# Patient Record
Sex: FEMALE | URBAN - METROPOLITAN AREA
[De-identification: names, ages, dates, MRNs, and addresses within clinical notes are randomized per-mention and may not be internally consistent; named-entity substitution may affect disease eponyms.]

---

## 2023-01-19 ENCOUNTER — APPOINTMENT (OUTPATIENT)
Dept: URBAN - METROPOLITAN AREA CLINIC 241 | Age: 73
Setting detail: DERMATOLOGY
End: 2023-01-21

## 2023-01-19 DIAGNOSIS — L20.89 OTHER ATOPIC DERMATITIS: ICD-10-CM

## 2023-01-19 PROBLEM — L20.84 INTRINSIC (ALLERGIC) ECZEMA: Status: ACTIVE | Noted: 2023-01-19

## 2023-01-19 PROCEDURE — OTHER PRESCRIPTION: OTHER

## 2023-01-19 PROCEDURE — 99213 OFFICE O/P EST LOW 20 MIN: CPT

## 2023-01-19 PROCEDURE — OTHER COUNSELING: TOPICAL STEROIDS: OTHER

## 2023-01-19 PROCEDURE — OTHER COUNSELING: OTHER

## 2023-01-19 PROCEDURE — OTHER PRESCRIPTION MEDICATION MANAGEMENT: OTHER

## 2023-01-19 RX ORDER — TRIAMCINOLONE ACETONIDE 1 MG/G
CREAM TOPICAL
Qty: 454 | Refills: 3 | Status: ERX | COMMUNITY
Start: 2023-01-19

## 2023-01-19 NOTE — PROCEDURE: PRESCRIPTION MEDICATION MANAGEMENT
Detail Level: Zone
Render In Strict Bullet Format?: No
Plan: Highly recommended to shower twice a day warm showers \\nDiscussed to use mild and free products
Initiate Treatment: Triamcinolone 0.1% topical cream twice a day \\n\\nDiscussed to use OTC Cereva cream to areas twice a day

## 2023-09-29 ENCOUNTER — TELEPHONE (OUTPATIENT)
Dept: ENDOCRINOLOGY CLINIC | Facility: CLINIC | Age: 73
End: 2023-09-29

## 2023-09-29 ENCOUNTER — OFFICE VISIT (OUTPATIENT)
Dept: ENDOCRINOLOGY CLINIC | Facility: CLINIC | Age: 73
End: 2023-09-29

## 2023-09-29 VITALS — DIASTOLIC BLOOD PRESSURE: 61 MMHG | SYSTOLIC BLOOD PRESSURE: 129 MMHG | WEIGHT: 110 LBS | HEART RATE: 71 BPM

## 2023-09-29 DIAGNOSIS — E10.65 UNCONTROLLED TYPE 1 DIABETES MELLITUS WITH HYPERGLYCEMIA (HCC): Primary | ICD-10-CM

## 2023-09-29 LAB
GLUCOSE BLOOD: 292
TEST STRIP LOT #: NORMAL NUMERIC

## 2023-09-29 RX ORDER — SENNOSIDES 8.6 MG
650 CAPSULE ORAL EVERY 8 HOURS PRN
COMMUNITY

## 2023-09-29 RX ORDER — BUDESONIDE AND FORMOTEROL FUMARATE DIHYDRATE 80; 4.5 UG/1; UG/1
2 AEROSOL RESPIRATORY (INHALATION) 2 TIMES DAILY
COMMUNITY
Start: 2023-07-26

## 2023-09-29 RX ORDER — ATORVASTATIN CALCIUM 40 MG/1
40 TABLET, FILM COATED ORAL NIGHTLY
COMMUNITY
Start: 2022-05-13

## 2023-09-29 RX ORDER — AMLODIPINE BESYLATE 2.5 MG/1
1 TABLET ORAL DAILY
COMMUNITY
Start: 2022-12-08

## 2023-09-29 RX ORDER — NICOTINE POLACRILEX 4 MG
15 LOZENGE BUCCAL ONCE
COMMUNITY

## 2023-09-29 RX ORDER — GLUCAGON INJECTION, SOLUTION 1 MG/.2ML
1 INJECTION, SOLUTION SUBCUTANEOUS
COMMUNITY
Start: 2023-04-27

## 2023-09-29 RX ORDER — ESCITALOPRAM OXALATE 10 MG/1
10 TABLET ORAL DAILY
COMMUNITY
Start: 2023-07-10

## 2023-09-29 RX ORDER — ACETAMINOPHEN 160 MG
2000 TABLET,DISINTEGRATING ORAL DAILY
COMMUNITY

## 2023-09-29 RX ORDER — LOPERAMIDE HYDROCHLORIDE 2 MG/1
2 TABLET ORAL DAILY
COMMUNITY

## 2023-09-29 RX ORDER — OXYBUTYNIN CHLORIDE 5 MG/1
5 TABLET, EXTENDED RELEASE ORAL DAILY
COMMUNITY
Start: 2023-08-22

## 2023-09-29 RX ORDER — MONTELUKAST SODIUM 10 MG/1
10 TABLET ORAL DAILY
COMMUNITY
Start: 2023-06-13

## 2023-09-29 RX ORDER — ASPIRIN 81 MG/1
81 TABLET ORAL DAILY
COMMUNITY

## 2023-09-29 RX ORDER — ERGOCALCIFEROL 1.25 MG/1
50000 CAPSULE ORAL WEEKLY
COMMUNITY

## 2023-09-29 RX ORDER — ALBUTEROL SULFATE 2.5 MG/3ML
3 SOLUTION RESPIRATORY (INHALATION) EVERY 6 HOURS PRN
COMMUNITY
Start: 2021-04-19

## 2023-09-29 RX ORDER — LOSARTAN POTASSIUM 50 MG/1
50 TABLET ORAL DAILY
COMMUNITY
Start: 2022-05-13

## 2023-09-29 RX ORDER — CLOPIDOGREL BISULFATE 75 MG/1
75 TABLET ORAL DAILY
COMMUNITY
Start: 2023-03-17

## 2023-09-29 RX ORDER — CETIRIZINE HYDROCHLORIDE 10 MG/1
10 TABLET ORAL DAILY
COMMUNITY
Start: 2023-08-22

## 2023-09-29 NOTE — PATIENT INSTRUCTIONS
Scotty Cloud 4/18/1950    Updated Regimen as of 10/2/2023 2:45pm:    Lantus    AM: 8 units   PM: 5 units    Novolog  IF glucose under 120 before meal - give 6 ounces of OJ then give insulin for meal     IF patient is not eating at least 30 gram of carbohdrate then give 2 unit less of insulin      CHANGE      Breakfast   Glucose 120-150 3 units  151-199 5 units  200-249 7 units  250-400 9 units     Lunch and dinner:   Glucose 120-150 2 units  151-199 3 units  200-249 5 units   250-299 6 units  300-349 7 units   350-400 8 units

## 2023-09-29 NOTE — PROGRESS NOTES
Name: Precious Damon  Date: 9/29/2023    Referring Physician: No ref. provider found    HISTORY OF PRESENT ILLNESS   Precious Damon is a 68year old female who presents for diabetes mellitus type 1, diagnosed approximately 200. She was previously followed by endocrinology at [facility]. However after most recent hospitalization this summer moved to assisted living and now Endocrinologist closer to home. Prior HbA, C or glycohemoglobin were 8.0% 8/2023. 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 Last CVA in 2021. First CVA 1996 -->since most recent hospitalization moved to assisted living          Hypertension present: Yes         Hyperlipidemia present: Yes         Peripheral Vascular Disease present: Yes    : Nephropathy present: No    Neuro: Neuropathy present: Yes    Skin: Infection or ulceration: No    Osteoporosis: Yes, diagnosed in 2015 -->previously maintained on prolia therapy     Thyroid disease: Yes      Medications:   No current outpatient medications on file. Allergies:   No Known Allergies    Social History:   Social History    Socioeconomic History      Marital status:     Tobacco Use      Smoking status: Never      Smokeless tobacco: Never      Medical History:   History reviewed.  No pertinent past medical history. Surgical history:   History reviewed. No pertinent surgical history. PHYSICAL EXAM  /61   Pulse 71   Wt 110 lb (49.9 kg)     General Appearance:  alert, well developed, in no acute distress  Eyes:  normal conjunctivae, sclera. , normal sclera and normal pupils  Ears/Nose/Mouth/Throat/Neck:  no palpable thyroid nodules or cervical lymphadenopathy  Musculoskeletal:  normal muscle strength and tone  Skin:  normal moisture and skin texture  Hair & Nails:  normal scalp hair     Hematologic:  no excessive bruising  Neuro:  sensory grossly intact and motor grossly intact, normal monofilament exam  Psychiatric:  oriented to time, self, and place  Nutritional:  no abnormal weight gain or loss      ASSESSMENT/PLAN:      Diabetes Mellitus Type 1   -Uncontrolled  -Discussed importance of glycemic control to prevent complications of diabetes  -Discussed complications of diabetes include retinopathy, neuropathy, nephropathy and cardiovascular disease  -Discussed ABCs of DM  -Discussed importance of SBGM  -Discussed importance of consistent meals and trying to pair CHO intake with insulin therapy  -Continue Lantus 8 units subcutaneous QAM; 5 units subcutaneous QPM  -Change Novolog scale to 2-9 units subcutaneous with meals based on BG levels  -Reviewed BG levels at length with family and Dorian CGM   -Suspect she does need bigger Novolog dose at breakfast   -Discussed possible iLet pump  -Normotensive    2. Osteoporosis  - Previous prolia therapy  - Will discuss with brother continued therapy     Continuous Glucose Monitoring Interpretation    Shaheen Laird has undergone continuous glucose monitoring with the personal [facility]. Her blood glucose tracings were evaluated for 2 weeks prior to office visit  Her blood glucose tracings demonstrated significant blood glucose variability particularly after meals.   She did experience hypoglycemia during the week of evaluation particularly after over correction. As a result of her testing her medication was adjusted as noted above.      RTC 3 months; will review CGM in 3 weeks     9/29/2023  Jarrell Aggarwal MD

## 2023-09-29 NOTE — TELEPHONE ENCOUNTER
[facility] Service requesting to speak with RN regarding patient's office visit today - wanted to know if there are changes to patient's Diabetes treatment plan. Please fax treatment plan to [phone]. Please call. Thank you.

## 2023-10-02 ENCOUNTER — TELEPHONE (OUTPATIENT)
Dept: ENDOCRINOLOGY CLINIC | Facility: CLINIC | Age: 73
End: 2023-10-02

## 2023-10-02 NOTE — TELEPHONE ENCOUNTER
Please call patient's brother or sister in regards to insulin instructions. Reviewed Dorian and she has significantly more hyperglycemia after breakfast compared to lunch and dinner. Did family have any decisions about iLet pump?      Change insulin based on meal    IF glucose under 120 before meal - give 6 ounces of OJ then give insulin for meal     IF patient is not eating at least 30 gram of carbohdrate then give 2 unit less of insulin     CHANGE     Breakfast   120-150 3 units  151-199 5 units  200-249 7 units  249-400 9 units    Lunch and dinner:   Glucose 120-150 2 units  151-199 3 units  200-249 5 units   240-299 6 units  300-349 7 units   350-400 8 units

## 2023-10-02 NOTE — TELEPHONE ENCOUNTER
Called and spoke with brother, Brett Quintana. Reviewed plan and he would like it in his VM; will plan to call again and leave . They have not made a decision on the Ilet pump as they are working with insurance but stated they should hear back in a couple of days and will notify us.  left on Chandan's cell phone reviewing plan    Of note, TE 9/29 shows LOV was faxed to [facility] at [phone] this morning. Faxed updated AVS regimen; Chandan aware and gave consent.

## 2023-10-04 NOTE — TELEPHONE ENCOUNTER
Amanda/[facility] called for refill for Alvino Newby. Please send to [address]. Pt is almost out of medication.

## 2023-10-05 RX ORDER — INSULIN ASPART 100 [IU]/ML
INJECTION, SOLUTION INTRAVENOUS; SUBCUTANEOUS
Qty: 27 ML | Refills: 0 | Status: SHIPPED | OUTPATIENT
Start: 2023-10-05

## 2023-10-06 NOTE — TELEPHONE ENCOUNTER
Called Aleks Garsia back and wanted to confirm with RN the scale that they have on file for patient's Novolog Flexpen. Per 10/02/23 TE the ff is the regimen:     \"IF glucose under 120 before meal - give 6 ounces of OJ then give insulin for meal     IF patient is not eating at least 30 gram of carbohdrate then give 2 unit less of insulin      CHANGE      Breakfast   120-150 3 units  151-199 5 units  200-249 7 units  250-400 9 units     Lunch and dinner:   Glucose 120-150 2 units  151-199 3 units  200-249 5 units   250-299 6 units  300-349 7 units   350-400 8 units\"    Aleks Garsia read what they have on file and it matches the above order as written. Nothing further is needed from the office at this time.

## 2023-10-22 ENCOUNTER — TELEPHONE (OUTPATIENT)
Dept: ENDOCRINOLOGY CLINIC | Facility: CLINIC | Age: 73
End: 2023-10-22

## 2023-10-22 NOTE — TELEPHONE ENCOUNTER
RN called from facility with morning BG level above 500. Last night was 240 prior to bedtime but saw some snacks in her room. Give 9 units of insulin with breakfast and recheck in 2 hours. Endo staff - please follow up tomorrow on BG levels. Thanks.

## 2023-10-23 ENCOUNTER — MED REC SCAN ONLY (OUTPATIENT)
Dept: ENDOCRINOLOGY CLINIC | Facility: CLINIC | Age: 73
End: 2023-10-23

## 2023-10-23 NOTE — TELEPHONE ENCOUNTER
Ok, noted. Has she been getting her bedtime lantus? Can they send over the list of BG levels from the past week?

## 2023-10-23 NOTE — TELEPHONE ENCOUNTER
RN paged 2 hours later glucose was still above 500 therefore gave 6 units of correction and recommended 8 units subcutaneous with lunch. RN staff please call facility this AM to get BG levels. Thanks.

## 2023-10-23 NOTE — TELEPHONE ENCOUNTER
Dr. Sonido Avila    Patient was given 9 units of Novolog this morning. RN rechecked blood sugar again after 15 minutes - still 529. Nurse is going to back once she can check sugar again and call us with an update. Nurse stated that she was Covid + on 10/13 however is testing negative now.

## 2023-10-23 NOTE — TELEPHONE ENCOUNTER
Pura Quinones again to follow up on glucose fax as RN has not received it. Also gave fax number of general fax. Will check again.

## 2023-10-23 NOTE — TELEPHONE ENCOUNTER
Peder Light calling back states blood sugar is 271 at 10:05am, states will check again in one hour before lunch. Please call.

## 2023-10-23 NOTE — TELEPHONE ENCOUNTER
Called nurse Antionette Sherman and gave below order and asked for RN to fax the order. RN generated a letter and faxed it to confirmed fax of [phone]    RN also called sister June on ROI and will bring [facility]  Thursday for download. June was also informed of the change in insulin dose.

## 2023-10-23 NOTE — TELEPHONE ENCOUNTER
Reviewed BG log. She is having a lot more hyperglycemia in the past week. This could be due partly to Covid infection. Increase Lantus to 8 units subcutaneous BID. Lets continue current Humalog dose for now. Please call patient's sister to see if she could bring us the CGM  for download on Thursday. Thanks.

## 2023-10-24 ENCOUNTER — TELEPHONE (OUTPATIENT)
Dept: ENDOCRINOLOGY CLINIC | Facility: CLINIC | Age: 73
End: 2023-10-24

## 2023-10-24 NOTE — TELEPHONE ENCOUNTER
Spoke to patient's sister who stated she will plan to keep Thursdays RN visit appointment for meter download.

## 2023-10-24 NOTE — TELEPHONE ENCOUNTER
Yes, that is fine. She can bring it whenever she has time from my schedule. Just need to check RN schedule. Thanks.

## 2023-10-25 ENCOUNTER — LAB REQUISITION (OUTPATIENT)
Dept: LAB | Facility: HOSPITAL | Age: 73
End: 2023-10-25

## 2023-10-25 DIAGNOSIS — R69 ILLNESS, UNSPECIFIED: ICD-10-CM

## 2023-10-25 LAB
BILIRUB UR QL STRIP.AUTO: NEGATIVE
CLARITY UR REFRACT.AUTO: CLEAR
COLOR UR AUTO: YELLOW
GLUCOSE UR STRIP.AUTO-MCNC: NORMAL MG/DL
HYALINE CASTS #/AREA URNS AUTO: PRESENT /LPF
KETONES UR STRIP.AUTO-MCNC: NEGATIVE MG/DL
LEUKOCYTE ESTERASE UR QL STRIP.AUTO: 250
NITRITE UR QL STRIP.AUTO: NEGATIVE
PH UR STRIP.AUTO: 7 [PH] (ref 5–8)
SP GR UR STRIP.AUTO: 1.02 (ref 1–1.03)
UROBILINOGEN UR STRIP.AUTO-MCNC: NORMAL MG/DL
WBC #/AREA URNS AUTO: >50 /HPF

## 2023-10-25 PROCEDURE — 87086 URINE CULTURE/COLONY COUNT: CPT | Performed by: FAMILY MEDICINE

## 2023-10-25 PROCEDURE — 87186 SC STD MICRODIL/AGAR DIL: CPT | Performed by: FAMILY MEDICINE

## 2023-10-25 PROCEDURE — 87088 URINE BACTERIA CULTURE: CPT | Performed by: FAMILY MEDICINE

## 2023-10-25 PROCEDURE — 81001 URINALYSIS AUTO W/SCOPE: CPT | Performed by: FAMILY MEDICINE

## 2023-10-26 ENCOUNTER — TELEPHONE (OUTPATIENT)
Dept: ENDOCRINOLOGY CLINIC | Facility: CLINIC | Age: 73
End: 2023-10-26

## 2023-10-26 ENCOUNTER — NURSE ONLY (OUTPATIENT)
Dept: ENDOCRINOLOGY CLINIC | Facility: CLINIC | Age: 73
End: 2023-10-26

## 2023-10-26 DIAGNOSIS — E10.65 UNCONTROLLED TYPE 1 DIABETES MELLITUS WITH HYPERGLYCEMIA (HCC): Primary | ICD-10-CM

## 2023-10-26 NOTE — PROGRESS NOTES
Patient's sister came in today to drop off patient's Cavazos 3 reader. Dorian 3 download placed in Dr. August Roman folder for review.

## 2023-10-26 NOTE — TELEPHONE ENCOUNTER
Unable to leave message for Amanda at [facility] [phone]  Please see TE from 10/23/23 regarding lantus increase   Spoke with sister and advised on note below.   Will try calling [employer] tomorrow.

## 2023-10-26 NOTE — TELEPHONE ENCOUNTER
Please call sister and facility - reviewed CGM which is significantly improved in the past 2 days.  I suspect the higher BG levels were due to infection.  For now lets continue the higher dose of Lantus and current humalog dose. Thanks.

## 2023-11-07 ENCOUNTER — PATIENT MESSAGE (OUTPATIENT)
Dept: ENDOCRINOLOGY CLINIC | Facility: CLINIC | Age: 73
End: 2023-11-07

## 2023-11-08 NOTE — TELEPHONE ENCOUNTER
Dr Posadas Half,    Please see below    OK to start the letter of necessity and SMN?  If so, please route back to me and I can start working on it

## 2023-11-08 NOTE — TELEPHONE ENCOUNTER
Dr Jas De La Cruz,    Completed SMN that you will need to sign/review (a little different than standard SMN) along with LOV, A1C, and demographics. Please review the below for LMN and let me know if you would like any changes/have any recommendations:    ------------    Date: 2023    Pt: Reyes Daniel  : 1950    To Whom It May Concern:    I am writing to request a new Beta Bionic ILet Pump for my patient Reyes Daniel. She has uncontrolled and brittle type 1 diabetes. This request is medically necessary for the following reasons:     Patient has a history of wide glycemic excursions resulting in hyper and hypo glycemia  Patient has a history of poorly control diabetes  Due to living situations, previous pump (which is in-warranty) cannot be utilized     The Lettuce Eat Pump will help to lessen her glycemic excursions and overall help to improve her poorly controlled diabetes. Though [facility]'s current insulin pump is in-warranty, I am requesting a new Beta Bionic ILet Insulin Pump. As is known, the Beta Bionic ILet pump is a closed loop system. It helps manage the burden of diabetes by determining a majority of settings/ratios based off of blood sugar patterns and weight. This high end technology eliminates carb counting making it much easier for patients (or caregivers/staff) to bolus and cover for meals. Additionally, this algorithm monitors for high and low blood sugar thus preventing frequent hyper and hypoglycemia events. Due to the algorithm, it automatically adjusts insulin in response to predicted glucose levels to help increase time in the American Diabetes Association recommended target range of  mg/dl. This is extremely important as Fran Haider has hypoglycemia unawareness. Maite's most recent A1c was 8.0% on 8/15/2023 with wide glycemic variability.    With a new Beta Bionic ILet Insulin Pump providing automated insulin adjustments to assist with tighter blood glucose management, I expect her to obtain a much-improved A1c of <7% which is at goal based off age, medical history, and living situation. As noted above, there are many benefits to this specific pump such as meal blousing, adjustments to basal, and mini corrections every 5 minutes resulting in more time in target blood sugar ranges. With new technology and considering that type 1 diabetes is a chronic lifelong disease, it is only of benefit to allow my patient to transition to this pump to prevent any future complications from diabetes.     Thank you for your time to review this request.      Sincerely,       Dr Alicia Osuna [facility]   [phone]

## 2023-11-09 NOTE — TELEPHONE ENCOUNTER
SMN signed (changing q3days, target higher 130 mg/dl), LMN signed, face sheet, A1C, and LOV completed    "[employer] message sent with attachment.  Awaiting response if he would also like us to send it to [facility] at [phone] and/or if ketone strips are needed    Placed in brown folder

## 2023-11-09 NOTE — TELEPHONE ENCOUNTER
I might add that it will decrease hospitalizations for severe glycemic excursions. Otherwise looks great.

## 2023-11-10 PROBLEM — F32.1 MAJOR DEPRESSIVE DISORDER, SINGLE EPISODE, MODERATE (HCC): Status: ACTIVE | Noted: 2023-11-10

## 2023-11-10 PROBLEM — F09 COGNITIVE DISORDER: Status: ACTIVE | Noted: 2023-11-10

## 2023-11-14 ENCOUNTER — HOSPITAL ENCOUNTER (OUTPATIENT)
Dept: GENERAL RADIOLOGY | Facility: HOSPITAL | Age: 73
Discharge: HOME | End: 2023-11-14
Attending: INTERNAL MEDICINE
Payer: MEDICARE

## 2023-11-14 DIAGNOSIS — R13.12 DYSPHAGIA, OROPHARYNGEAL: ICD-10-CM

## 2023-11-14 PROCEDURE — 92611 MOTION FLUOROSCOPY/SWALLOW: CPT

## 2023-11-14 PROCEDURE — 74230 X-RAY XM SWLNG FUNCJ C+: CPT | Performed by: INTERNAL MEDICINE

## 2023-11-14 NOTE — PROGRESS NOTES
ADULT VIDEOFLUOROSCOPIC SWALLOWING STUDY    Admission Date: 11/14/2023  Evaluation Date: 11/14/23  Radiologist: Dr. Tristan Charles: Regular  Diet Recommendations - Liquids: Thin Liquids    Further Follow-up:           Aspiration Precautions: Upright position; Slow rate;Small bites and sips  Medication Administration Recommendations: One pill at a time  Treatment Plan/Recommendations:  (follow up with referring physician)    HISTORY   Background/Objective Information:  Patient and family present to report history with some history supplemented by chart review. Patient with history of 7 strokes per family. Patient reports po sticks in her throat with no episodes of regurgitation. No history of pneumonia. She did have COVID about 4 weeks ago, though recovered at the time of this visit. Chart review reveals VFSS completed in April of 2021 that found no laryngeal penetration or tracheal aspiration. Problem List  Active Problems:    * No active hospital problems. *      Past Medical History  No past medical history on file. Current Diet Consistency: Regular; Thin liquids              Imaging results: no recent applicable imaging    Reason for Referral:  (difficulty swallowing, sensation of po sticking)      Family/Patient Goals: To swallow safely     ASSESSMENT   DYSPHAGIA ASSESSMENT  Test completed in conjunction with Radiologist.  Patient Positioned: Upright;Midline; Wheelchair. Patient Viewed: Lateral.   .  Consistencies Presented: Thin liquids; Hard solid (barium tablet) to assess oropharyngeal swallow function and assess for compensatory strategies to improve safe swallow function. THIN LIQUIDS  Method of Presentation: Cup (self presented)  Oral Phase of Swallow (VFSS - Thin Liquids): Within Functional Limits  Triggered at: Valleculae  Residue Severity, Location: Trace;Valleculae;Pyriform sinuses;Aryepiglottic folds; Posterior pharyngeal wall  Laryngeal Penetration: None  Tracheal Aspiration: None              HARD SOLID  Oral Phase of Swallow (VFSS - Hard Solid): Impaired  Bolus Retrieval (VFSS - Hard Solid): Intact  Bilabial Seal (VFSS - Hard Solid): Intact  Bolus Formation (VFSS - Hard Solid): Impaired  Bolus Propulsion (VFSS - Hard Solid): Impaired  Mastication (VFSS - Hard Solid):  (prolonged but adequate)  Triggered at: Valleculae  Residue Severity, Location: Mild;Valleculae  Cleared/Reduced with: Liquid assist  Laryngeal Penetration: None  Tracheal Aspiration: None  Penetration Aspiration Scale Score: Score 1: Material does not enter airway       Overall Impression: Patient presents with oral phase impairment evidenced by AP rocking with solids and some difficulty with swallow initiation with solids. With barium tablet, patient was unable to execute oral to pharyngeal transition of tablet to assess transit of bolus through pharynx. She noted after completion of the study that she turns her head to the left while swallowing pills and swallows them without difficulty. There was no laryngeal penetration or tracheal aspiration. Patient appears to be managing her post stroke residual deficits well with respect to swallowing. PLAN: Follow up with referring physician. Encouraged patient to continue to monitor dysphagia and if symtoms worsen or become more frequent, consider re-evaluation    EDUCATION/INSTRUCTION  Reviewed results and recommendations with patient and family. Agreement/Understanding verbalized and all questions answered to their apparent satisfaction. INTERDISCIPLINARY COMMUNICATION  Reviewed results with Radiologist; agreement verbalized.                      FOLLOW UP  Treatment Plan/Recommendations:  (follow up with referring physician)       Thank you for your referral.   If you have any questions, please contact Beatriz Granados 92  Pager [phone]

## 2023-11-30 ENCOUNTER — TELEPHONE (OUTPATIENT)
Dept: ENDOCRINOLOGY CLINIC | Facility: CLINIC | Age: 73
End: 2023-11-30

## 2023-11-30 NOTE — TELEPHONE ENCOUNTER
Ale/[facility] states that pts sugar dropped to 63 last night.  Pt was given orange juice and this morning it 222. Please call.

## 2023-11-30 NOTE — TELEPHONE ENCOUNTER
Tried to call nurse back - call was forwarded to voicemail. Left detailed message to contact office again tomorrow after 8 am and if possible to fax over patient's most recent blood sugar levels to office.

## 2023-12-06 RX ORDER — INSULIN GLARGINE 100 [IU]/ML
8 INJECTION, SOLUTION SUBCUTANEOUS 2 TIMES DAILY
Qty: 15 ML | Refills: 0 | Status: SHIPPED | OUTPATIENT
Start: 2023-12-06 | End: 2023-12-08

## 2023-12-06 NOTE — TELEPHONE ENCOUNTER
Last refilled : rx never prescribe   Last OV: 09/29/23  F/U 3 months   Future Appointments   Date Time Provider Cortez Schilling   1/8/2024 10:30 AM Kyle Pérez MD [facility]     Rx pended

## 2023-12-06 NOTE — TELEPHONE ENCOUNTER
Assisted living calling for a refill to be sent to [address] [place] [address]. Insulin Glargine (LANTUS SOLOSTAR SC), Inject 8 Units into the skin in the morning and 8 Units before bedtime. , Disp: , Rfl:

## 2023-12-07 ENCOUNTER — LAB REQUISITION (OUTPATIENT)
Dept: LAB | Facility: HOSPITAL | Age: 73
End: 2023-12-07
Payer: MEDICARE

## 2023-12-07 DIAGNOSIS — I10 ESSENTIAL (PRIMARY) HYPERTENSION: ICD-10-CM

## 2023-12-07 LAB
ALBUMIN SERPL-MCNC: 3.8 G/DL (ref 3.4–5)
ALBUMIN/GLOB SERPL: 1.3 {RATIO} (ref 1–2)
ALP LIVER SERPL-CCNC: 73 U/L
ALT SERPL-CCNC: 31 U/L
ANION GAP SERPL CALC-SCNC: 7 MMOL/L (ref 0–18)
AST SERPL-CCNC: 24 U/L (ref 15–37)
BASOPHILS # BLD AUTO: 0.03 X10(3) UL (ref 0–0.2)
BASOPHILS NFR BLD AUTO: 0.6 %
BILIRUB SERPL-MCNC: 0.7 MG/DL (ref 0.1–2)
BUN BLD-MCNC: 16 MG/DL (ref 9–23)
CALCIUM BLD-MCNC: 9.1 MG/DL (ref 8.5–10.1)
CHLORIDE SERPL-SCNC: 105 MMOL/L (ref 98–112)
CHOLEST SERPL-MCNC: 146 MG/DL (ref ?–200)
CO2 SERPL-SCNC: 27 MMOL/L (ref 21–32)
CREAT BLD-MCNC: 0.75 MG/DL
EGFRCR SERPLBLD CKD-EPI 2021: 84 ML/MIN/1.73M2 (ref 60–?)
EOSINOPHIL # BLD AUTO: 0.17 X10(3) UL (ref 0–0.7)
EOSINOPHIL NFR BLD AUTO: 3.2 %
ERYTHROCYTE [DISTWIDTH] IN BLOOD BY AUTOMATED COUNT: 12.6 %
EST. AVERAGE GLUCOSE BLD GHB EST-MCNC: 235 MG/DL (ref 68–126)
FASTING PATIENT LIPID ANSWER: YES
GLOBULIN PLAS-MCNC: 2.9 G/DL (ref 2.8–4.4)
GLUCOSE BLD-MCNC: 125 MG/DL (ref 70–99)
HBA1C MFR BLD: 9.8 % (ref ?–5.7)
HCT VFR BLD AUTO: 35.6 %
HDLC SERPL-MCNC: 79 MG/DL (ref 40–59)
HGB BLD-MCNC: 12 G/DL
IMM GRANULOCYTES # BLD AUTO: 0.01 X10(3) UL (ref 0–1)
IMM GRANULOCYTES NFR BLD: 0.2 %
LDLC SERPL CALC-MCNC: 54 MG/DL (ref ?–100)
LYMPHOCYTES # BLD AUTO: 1.7 X10(3) UL (ref 1–4)
LYMPHOCYTES NFR BLD AUTO: 32.3 %
MCH RBC QN AUTO: 30.5 PG (ref 26–34)
MCHC RBC AUTO-ENTMCNC: 33.7 G/DL (ref 31–37)
MCV RBC AUTO: 90.4 FL
MONOCYTES # BLD AUTO: 0.37 X10(3) UL (ref 0.1–1)
MONOCYTES NFR BLD AUTO: 7 %
NEUTROPHILS # BLD AUTO: 2.99 X10 (3) UL (ref 1.5–7.7)
NEUTROPHILS # BLD AUTO: 2.99 X10(3) UL (ref 1.5–7.7)
NEUTROPHILS NFR BLD AUTO: 56.7 %
NONHDLC SERPL-MCNC: 67 MG/DL (ref ?–130)
OSMOLALITY SERPL CALC.SUM OF ELEC: 291 MOSM/KG (ref 275–295)
PLATELET # BLD AUTO: 288 10(3)UL (ref 150–450)
POTASSIUM SERPL-SCNC: 4 MMOL/L (ref 3.5–5.1)
PROT SERPL-MCNC: 6.7 G/DL (ref 6.4–8.2)
RBC # BLD AUTO: 3.94 X10(6)UL
SODIUM SERPL-SCNC: 139 MMOL/L (ref 136–145)
TRIGL SERPL-MCNC: 65 MG/DL (ref 30–149)
TSI SER-ACNC: 1.61 MIU/ML (ref 0.36–3.74)
VIT B12 SERPL-MCNC: 541 PG/ML (ref 193–986)
VLDLC SERPL CALC-MCNC: 9 MG/DL (ref 0–30)
WBC # BLD AUTO: 5.3 X10(3) UL (ref 4–11)

## 2023-12-07 PROCEDURE — 83036 HEMOGLOBIN GLYCOSYLATED A1C: CPT

## 2023-12-07 PROCEDURE — 82607 VITAMIN B-12: CPT

## 2023-12-07 PROCEDURE — 84443 ASSAY THYROID STIM HORMONE: CPT

## 2023-12-07 PROCEDURE — 80061 LIPID PANEL: CPT

## 2023-12-07 PROCEDURE — 80053 COMPREHEN METABOLIC PANEL: CPT

## 2023-12-07 PROCEDURE — 85025 COMPLETE CBC W/AUTO DIFF WBC: CPT

## 2023-12-08 PROBLEM — F09 COGNITIVE DISORDER: Status: RESOLVED | Noted: 2023-11-10 | Resolved: 2023-12-08

## 2023-12-08 PROBLEM — F01.511 VASCULAR DEMENTIA WITH AGITATION (HCC): Status: ACTIVE | Noted: 2023-12-08

## 2023-12-08 RX ORDER — INSULIN GLARGINE 100 [IU]/ML
8 INJECTION, SOLUTION SUBCUTANEOUS 2 TIMES DAILY
Qty: 15 ML | Refills: 0 | Status: SHIPPED | OUTPATIENT
Start: 2023-12-08

## 2023-12-08 RX ORDER — INSULIN ASPART 100 [IU]/ML
INJECTION, SOLUTION INTRAVENOUS; SUBCUTANEOUS
Qty: 27 ML | Refills: 0 | Status: SHIPPED | OUTPATIENT
Start: 2023-12-08

## 2023-12-08 NOTE — TELEPHONE ENCOUNTER
[facility] requesting refills for Lantus & Novolog insulin. Please call. Thank you. Current Outpatient Medications   Medication Sig Dispense Refill    insulin glargine (LANTUS SOLOSTAR) 100 UNIT/ML Subcutaneous Solution Pen-injector Inject 8 Units into the skin in the morning and 8 Units before bedtime. 15 mL 0    insulin aspart (NOVOLOG FLEXPEN) 100 Units/mL Subcutaneous Solution Pen-injector Inject 3 times daily with meals in accordance to sliding scale. Max daily dose 27 units.  27 mL 0

## 2023-12-21 ENCOUNTER — TELEPHONE (OUTPATIENT)
Dept: ENDOCRINOLOGY CLINIC | Facility: CLINIC | Age: 73
End: 2023-12-21

## 2023-12-21 NOTE — TELEPHONE ENCOUNTER
Spoke with patient's nurse and she verbalizes understanding. Requesting new sliding scale to be faxed to: [phone]. New Novolog sliding scale:  Breakfast:   Glucose 120-150 5 units  151-199 7 units  200-249 9 units  250-400 11 units    Lunch and dinner:   120-150 2 units  151-199 3 units  200-249 5 units   250-299 6 units  300-349 7 units   350-400 8 units     Faxed order.

## 2023-12-21 NOTE — TELEPHONE ENCOUNTER
Dr. Zenaida Osman, patient's nurse at nursing home reports elevated BG reading before lunch now is 516    Hyperglycemia     Onset of hyperglycemia:   Fasting today: 256 (gave 6 units Novolog)  - Ate pancakes with syrup  Before lunch (now) 516 : gave 8 units Novolog    12/20:    HS: 257, BD: 279, BL: 197, BB: 171  12/19:   HS: 98, BD: 253, BL: 400, BB: 103    Symptoms: no symptoms    Pattern of hyperglycemia:     Steroid therapy: no    Acute Illness: no    Change in Diet: Pancakes and syrup for breakfast    List DM Medications/Compliance:  8 units lantus morning and 8 units in evening  Novolog per sliding scale for meals:    Breakfast:   Glucose 120-150 3 units  151-199 5 units  200-249 7 units  250-400 9 units    Lunch and dinner:   120-150 2 units  151-199 3 units  200-249 5 units   250-299 6 units  300-349 7 units   350-400 8 units     Call back RN at [phone] Pt reports smoking for 29 years, quit in 2003, reports social EtOH and denies illicit drug use,

## 2023-12-21 NOTE — TELEPHONE ENCOUNTER
SHERRY Sheets, spoke with nurse again and BG is now 70--they gave her 1 cup of orange juice and cheese stick.  She will have dinner at 4pm.

## 2023-12-21 NOTE — TELEPHONE ENCOUNTER
Ok, noted. Please ask RN to give additional 4 units of Novolog now to correct glucose. Change breakfast scale to start at 5 units then add based on scale below. Recheck glucose in 2 hours. Thanks.

## 2024-01-02 ENCOUNTER — TELEPHONE (OUTPATIENT)
Dept: ENDOCRINOLOGY CLINIC | Facility: CLINIC | Age: 74
End: 2024-01-02

## 2024-01-02 NOTE — TELEPHONE ENCOUNTER
Katherine from [facility] states blood sugar level for pt today was at 61 pt ate and had orange juice and it is normal again please note [phone]

## 2024-01-03 RX ORDER — INSULIN GLARGINE 100 [IU]/ML
8 INJECTION, SOLUTION SUBCUTANEOUS 2 TIMES DAILY
Qty: 15 ML | Refills: 0 | Status: SHIPPED | OUTPATIENT
Start: 2024-01-03

## 2024-01-03 RX ORDER — INSULIN GLARGINE 100 [IU]/ML
8 INJECTION, SOLUTION SUBCUTANEOUS 2 TIMES DAILY
Qty: 15 ML | Refills: 0 | Status: SHIPPED | OUTPATIENT
Start: 2024-01-03 | End: 2024-01-03

## 2024-01-03 NOTE — TELEPHONE ENCOUNTER
Spoke to Katherine from [facility]. She wanted to inform MD of patients level being at 61 yesterday, but once she ate and had orange juice level went back to normal. Patient was asymptomatic with level was at 61. Reports this morning her level was at 274. Patient received 11 units of lantus. Reports patient doing fine with no concerns. She would like to request refills on Lantus as patient is running low on script. Please review and sign off if appropriate. Thank you.

## 2024-01-03 NOTE — TELEPHONE ENCOUNTER
Spoke to Katherine and relayed message as shown below. Verbalized understanding. Informed of refill being sent and was requested to send script to [facility] pharmacy. Script sent to preferred pharmacy.     [facility] No further action required.

## 2024-01-03 NOTE — TELEPHONE ENCOUNTER
Refill for Lantus sent. Fasting sugar 61 yesterday but 274 today so continue current long acting insulin dose for now but contact clinic if any additional glucose levels <80 as we may need to adjust dose. Thanks!

## 2024-01-08 ENCOUNTER — TELEPHONE (OUTPATIENT)
Dept: ENDOCRINOLOGY CLINIC | Facility: CLINIC | Age: 74
End: 2024-01-08

## 2024-01-08 ENCOUNTER — OFFICE VISIT (OUTPATIENT)
Dept: ENDOCRINOLOGY CLINIC | Facility: CLINIC | Age: 74
End: 2024-01-08
Payer: MEDICARE

## 2024-01-08 VITALS — HEART RATE: 63 BPM | SYSTOLIC BLOOD PRESSURE: 137 MMHG | DIASTOLIC BLOOD PRESSURE: 61 MMHG

## 2024-01-08 DIAGNOSIS — M81.0 AGE-RELATED OSTEOPOROSIS WITHOUT CURRENT PATHOLOGICAL FRACTURE: ICD-10-CM

## 2024-01-08 DIAGNOSIS — E10.65 UNCONTROLLED TYPE 1 DIABETES MELLITUS WITH HYPERGLYCEMIA (HCC): Primary | ICD-10-CM

## 2024-01-08 LAB
GLUCOSE BLOOD: 295
TEST STRIP LOT #: NORMAL NUMERIC

## 2024-01-08 NOTE — PATIENT INSTRUCTIONS
Lantus 8 units subcutaneous QAM; 5 units subcutaneous QPM     Please see the updated Novolog sliding scale:     Novolog sliding scale:  Breakfast:   Glucose 120-150 5 units  151-199 7 units  200-249 10 units  250-300 10 units  300-400 14 units    Lunch:   120-150 2 units  151-199 3 units  200-249 5 units   250-299 6 units  300-349 7 units   350-400 8 units     Dinner:   120-150 2 units  151-199 3 units  200-249 5 units  250-299 6 units  300-349 8 units  350-450 10 units

## 2024-01-08 NOTE — TELEPHONE ENCOUNTER
Katherine calling from [facility] in [place], [place] (assistant living) calling regards medication questions, states needs insulin sliding scale. Please call.

## 2024-01-08 NOTE — TELEPHONE ENCOUNTER
Received fax from [employer] stating Lantus is not covered under pts plan. Temporary 30day supply will be sent. Alternatives that are covered under pts plan:  - Basaglar Kwikpen  -tresiba  -tresiba flextouch  -toujeo solostar  - tojeo max solostar    Placed in denial folder.

## 2024-01-08 NOTE — TELEPHONE ENCOUNTER
Mary - when you have time can you look back at this pump approval for iLet.  The pump was not approved and son is stating that the ppw needs to be submitted to Medicare.  Thanks.

## 2024-01-08 NOTE — TELEPHONE ENCOUNTER
Yes, correct 200-300 10 units.  Then jump to 14 units if above 300 because her glucose level does not seem to correct right now with the current 11 units. Thanks.

## 2024-01-08 NOTE — TELEPHONE ENCOUNTER
Dr. Raza,     Nurse from assisted living facility would like to clarify the sliding scale for patient's novolog:     Novolog sliding scale:  Breakfast:   Glucose 120-150 5 units  151-199 7 units  200-249 10 units  250-300 10 units  >> they would like to confirm that this will be at 10 units (same as 200-249)  300-400 14 units >> also asking for confirmation on this.      Please advise.  Thank you.

## 2024-01-08 NOTE — PROGRESS NOTES
Name: Maite Cavazos  Date: 1/8/2024    Referring Physician: No ref. provider found    HISTORY OF PRESENT ILLNESS   Maite Cavazos is a 73 year old female who presents for diabetes mellitus type 1, diagnosed approximately 1990.      She was previously followed by endocrinology at [facility].  However after most recent hospitalization this summer moved to assisted living and now Endocrinologist closer to home.     BG levels continues to be difficult to control at assisted living with significant hyperglycemia.     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  Last CVA in 2021.  First CVA 1996 -->since most recent hospitalization moved to assisted living          Hypertension present: Yes         Hyperlipidemia present: Yes         Peripheral Vascular Disease present: Yes    : Nephropathy present: No    Neuro: Neuropathy present: Yes    Skin: Infection or ulceration: No    Osteoporosis: Yes, diagnosed in 2015 -->previously maintained on prolia therapy     Thyroid disease: Yes      Medications:     Current Outpatient Medications:     insulin glargine (LANTUS SOLOSTAR) 100 UNIT/ML Subcutaneous Solution Pen-injector, Inject 8 Units into the skin in the morning and 8 Units before bedtime., Disp: 15 mL, Rfl: 0    insulin aspart (NOVOLOG  FLEXPEN) 100 Units/mL Subcutaneous Solution Pen-injector, Inject 3 times daily with meals in accordance to sliding scale. Max daily dose 27 units., Disp: 27 mL, Rfl: 0    glucagon (GVOKE HYPOPEN 2-PACK) 1 MG/0.2ML Subcutaneous SUBQ injection, Inject 0.2 mL (1 mg total) into the skin., Disp: , Rfl:     albuterol (2.5 MG/3ML) 0.083% Inhalation Nebu Soln, Inhale 3 mL into the lungs every 6 (six) hours as needed., Disp: , Rfl:     amLODIPine 2.5 MG Oral Tab, Take 1 tablet (2.5 mg total) by mouth daily., Disp: , Rfl:     atorvastatin 40 MG Oral Tab, Take 1 tablet (40 mg total) by mouth nightly., Disp: , Rfl:     Budesonide-Formoterol Fumarate 80-4.5 MCG/ACT Inhalation Aerosol, Inhale 2 puffs into the lungs 2 (two) times daily., Disp: , Rfl:     cetirizine 10 MG Oral Tab, Take 1 tablet (10 mg total) by mouth daily., Disp: , Rfl:     clopidogrel 75 MG Oral Tab, Take 1 tablet (75 mg total) by mouth daily., Disp: , Rfl:     escitalopram 10 MG Oral Tab, Take 1 tablet (10 mg total) by mouth daily., Disp: , Rfl:     insulin detemir 100 UNIT/ML Subcutaneous Solution, Inject 5 Units into the skin 2 (two) times daily., Disp: , Rfl:     losartan 50 MG Oral Tab, Take 1 tablet (50 mg total) by mouth daily., Disp: , Rfl:     montelukast 10 MG Oral Tab, Take 1 tablet (10 mg total) by mouth daily., Disp: , Rfl:     oxybutynin ER 5 MG Oral Tablet 24 Hr, Take 1 tablet (5 mg total) by mouth daily., Disp: , Rfl:     cholecalciferol 50 MCG (2000 UT) Oral Cap, Take 1 capsule (2,000 Units total) by mouth daily. 1 tablet everyday, Disp: , Rfl:     ergocalciferol 1.25 MG (05733 UT) Oral Cap, Take 1 capsule (50,000 Units total) by mouth once a week., Disp: , Rfl:     Acetaminophen ER (MAPAP ARTHRITIS PAIN) 650 MG Oral Tab CR, Take 1 tablet (650 mg total) by mouth every 8 (eight) hours as needed for Pain., Disp: , Rfl:     Ca Phosphate-Cholecalciferol (CALTRATE GUMMY BITES OR), Take by mouth., Disp: , Rfl:     glucose (GLUTOSE 15) 40% Oral Gel,  Take 37.5 mL (15 g total) by mouth once., Disp: , Rfl:     aspirin 81 MG Oral Tab EC, Take 1 tablet (81 mg total) by mouth daily., Disp: , Rfl:     Loperamide HCl (ANTI-DIARRHEAL) 2 MG Oral Tab, Take 1 tablet (2 mg total) by mouth daily., Disp: , Rfl:      Allergies:   No Known Allergies    Social History:   Social History     Socioeconomic History    Marital status:    Tobacco Use    Smoking status: Never    Smokeless tobacco: Never       Medical History:   No past medical history on file.    Surgical history:   No past surgical history on file.      PHYSICAL EXAM  /61   Pulse 63     General Appearance:  alert, well developed, in no acute distress  Eyes:  normal conjunctivae, sclera., normal sclera and normal pupils  Ears/Nose/Mouth/Throat/Neck:  no palpable thyroid nodules or cervical lymphadenopathy  Musculoskeletal:  normal muscle strength and tone  Skin:  normal moisture and skin texture  Hair & Nails:  normal scalp hair     Hematologic:  no excessive bruising  Neuro:  sensory grossly intact and motor grossly intact, normal monofilament exam  Psychiatric:  oriented to time, self, and place  Nutritional:  no abnormal weight gain or loss      ASSESSMENT/PLAN:      Diabetes Mellitus Type 1   -Uncontrolled  -Discussed importance of glycemic control to prevent complications of diabetes  -Discussed complications of diabetes include retinopathy, neuropathy, nephropathy and cardiovascular disease  -Discussed ABCs of DM  -Discussed importance of SBGM  -Discussed importance of consistent meals and trying to pair CHO intake with insulin therapy  -Continue Lantus 8 units subcutaneous QAM; 5 units subcutaneous QPM  -Change Novolog scale and provided new instructions for assisted living   -Reviewed BG levels at length with family and Dorian CGM   -Discussed possible iLet pump; will continue to work on authorization   -Normotensive    2. Osteoporosis  - Restart prolia therapy, injection given today       Continuous Glucose Monitoring Interpretation    Maite Cavazos has undergone continuous glucose monitoring with the personal Freestyle Dorian.   Her blood glucose tracings were evaluated for 2 weeks prior to office visit  Her blood glucose tracings demonstrated significant blood glucose variability particularly after meals.  She did not experience significant hypoglycemia - only one episode in past 2 weeks.  As a result of her testing her medication was adjusted as noted above.     RTC 3 months    1/8/2024  Melly Raza MD           Detail Level: Simple Detail Level: Zone

## 2024-01-09 NOTE — TELEPHONE ENCOUNTER
Reviewed chart and it does not look like we received any denial paperwork    Email sent to Susana from [employer] with paperwork to look into this case

## 2024-01-11 NOTE — TELEPHONE ENCOUNTER
Dr Raza -- just an FYI    Multiple emails with Susana from [facility]. After looking into case, despite LMN, Medicare plan will not cover another pump while pt is currently in Tandem warranty. Options would be to pay out of pocket ($3500) or wait until Tandem warranty has     Mychart message sent to pt

## 2024-01-12 NOTE — TELEPHONE ENCOUNTER
Dr Raza,    I was connected to Sammy from [employer] who handles the PA's. Message from Sammy:      \"Wang Hilario,     I trust this message finds you well. I aim to provide clarity on the present situation. It's important to note that Medicare does not involve an authorization process. For Medicare to cover the cost of a new insulin pump, the patient must fulfill all Medicare requirements. A key requirement is that the current insulin pump must be out of warranty; if it's still under warranty, the request will be denied.    Regrettably, we are unable to initiate a prior authorization with Medicare, and our distributor partners are reluctant to handle in-warranty Medicare patients due to the anticipated lack of coverage. Unfortunately, this leaves us with limited options, as there is little we can do in this circumstance.      Please let me know If you have any more questions. Thank you!      Kind Regards\"

## 2024-01-12 NOTE — TELEPHONE ENCOUNTER
Spoke with Shelli PEREIRA and reviewed case. Discussed the possibility of if office could submit paperwork and PA to Medicare insurance. Stated that pump paperwork needs to be submitted by pump company due to the billing/codes process. Office is unable to submit these requests

## 2024-01-22 ENCOUNTER — MOBILE ENCOUNTER (OUTPATIENT)
Dept: ENDOCRINOLOGY CLINIC | Facility: CLINIC | Age: 74
End: 2024-01-22

## 2024-01-22 NOTE — PROGRESS NOTES
@3am BG above 400 at assisted living facility   Talked to Ms. Jung  Will give correction dose per sliding scale

## 2024-01-27 ENCOUNTER — HOSPITAL ENCOUNTER (EMERGENCY)
Facility: HOSPITAL | Age: 74
Discharge: HOME | End: 2024-01-28
Attending: EMERGENCY MEDICINE
Payer: MEDICARE

## 2024-01-27 ENCOUNTER — APPOINTMENT (OUTPATIENT)
Dept: CT IMAGING | Facility: HOSPITAL | Age: 74
End: 2024-01-27
Attending: EMERGENCY MEDICINE
Payer: MEDICARE

## 2024-01-27 VITALS
RESPIRATION RATE: 16 BRPM | DIASTOLIC BLOOD PRESSURE: 62 MMHG | SYSTOLIC BLOOD PRESSURE: 185 MMHG | HEART RATE: 89 BPM | TEMPERATURE: 98 F | WEIGHT: 110.25 LBS | OXYGEN SATURATION: 98 %

## 2024-01-27 DIAGNOSIS — S02.31XA CLOSED FRACTURE OF RIGHT ORBITAL FLOOR, INITIAL ENCOUNTER (HCC): Primary | ICD-10-CM

## 2024-01-27 DIAGNOSIS — S01.81XA LACERATION OF FOREHEAD, INITIAL ENCOUNTER: ICD-10-CM

## 2024-01-27 LAB
ALBUMIN SERPL-MCNC: 3.9 G/DL (ref 3.4–5)
ALBUMIN/GLOB SERPL: 1.2 {RATIO} (ref 1–2)
ALP LIVER SERPL-CCNC: 102 U/L
ANION GAP SERPL CALC-SCNC: 6 MMOL/L (ref 0–18)
AST SERPL-CCNC: 37 U/L (ref 15–37)
BASOPHILS # BLD AUTO: 0.06 X10(3) UL (ref 0–0.2)
BASOPHILS NFR BLD AUTO: 0.7 %
BILIRUB SERPL-MCNC: 0.5 MG/DL (ref 0.1–2)
BUN BLD-MCNC: 26 MG/DL (ref 9–23)
CALCIUM BLD-MCNC: 9.1 MG/DL (ref 8.5–10.1)
CHLORIDE SERPL-SCNC: 105 MMOL/L (ref 98–112)
CK SERPL-CCNC: 99 U/L
CO2 SERPL-SCNC: 26 MMOL/L (ref 21–32)
CREAT BLD-MCNC: 0.98 MG/DL
EGFRCR SERPLBLD CKD-EPI 2021: 61 ML/MIN/1.73M2 (ref 60–?)
EOSINOPHIL # BLD AUTO: 0.2 X10(3) UL (ref 0–0.7)
EOSINOPHIL NFR BLD AUTO: 2.4 %
ERYTHROCYTE [DISTWIDTH] IN BLOOD BY AUTOMATED COUNT: 12.6 %
GLOBULIN PLAS-MCNC: 3.3 G/DL (ref 2.8–4.4)
GLUCOSE BLD-MCNC: 287 MG/DL (ref 70–99)
GLUCOSE BLD-MCNC: 308 MG/DL (ref 70–99)
HCT VFR BLD AUTO: 36.6 %
HGB BLD-MCNC: 12.5 G/DL
IMM GRANULOCYTES # BLD AUTO: 0.04 X10(3) UL (ref 0–1)
IMM GRANULOCYTES NFR BLD: 0.5 %
LYMPHOCYTES # BLD AUTO: 1.93 X10(3) UL (ref 1–4)
LYMPHOCYTES NFR BLD AUTO: 22.9 %
MCH RBC QN AUTO: 30.8 PG (ref 26–34)
MCHC RBC AUTO-ENTMCNC: 34.2 G/DL (ref 31–37)
MCV RBC AUTO: 90.1 FL
MONOCYTES # BLD AUTO: 0.57 X10(3) UL (ref 0.1–1)
MONOCYTES NFR BLD AUTO: 6.8 %
NEUTROPHILS # BLD AUTO: 5.62 X10 (3) UL (ref 1.5–7.7)
NEUTROPHILS # BLD AUTO: 5.62 X10(3) UL (ref 1.5–7.7)
NEUTROPHILS NFR BLD AUTO: 66.7 %
OSMOLALITY SERPL CALC.SUM OF ELEC: 299 MOSM/KG (ref 275–295)
PLATELET # BLD AUTO: 222 10(3)UL (ref 150–450)
POTASSIUM SERPL-SCNC: 4.5 MMOL/L (ref 3.5–5.1)
PROT SERPL-MCNC: 7.2 G/DL (ref 6.4–8.2)
RBC # BLD AUTO: 4.06 X10(6)UL
SODIUM SERPL-SCNC: 137 MMOL/L (ref 136–145)
WBC # BLD AUTO: 8.4 X10(3) UL (ref 4–11)

## 2024-01-27 PROCEDURE — 36415 COLL VENOUS BLD VENIPUNCTURE: CPT

## 2024-01-27 PROCEDURE — 85025 COMPLETE CBC W/AUTO DIFF WBC: CPT | Performed by: EMERGENCY MEDICINE

## 2024-01-27 PROCEDURE — 99284 EMERGENCY DEPT VISIT MOD MDM: CPT

## 2024-01-27 PROCEDURE — 82550 ASSAY OF CK (CPK): CPT | Performed by: EMERGENCY MEDICINE

## 2024-01-27 PROCEDURE — 12011 RPR F/E/E/N/L/M 2.5 CM/<: CPT

## 2024-01-27 PROCEDURE — 70450 CT HEAD/BRAIN W/O DYE: CPT | Performed by: EMERGENCY MEDICINE

## 2024-01-27 PROCEDURE — 99285 EMERGENCY DEPT VISIT HI MDM: CPT

## 2024-01-27 PROCEDURE — 82962 GLUCOSE BLOOD TEST: CPT

## 2024-01-27 PROCEDURE — 80053 COMPREHEN METABOLIC PANEL: CPT | Performed by: EMERGENCY MEDICINE

## 2024-01-27 RX ORDER — ACETAMINOPHEN 500 MG
1000 TABLET ORAL ONCE
Status: COMPLETED | OUTPATIENT
Start: 2024-01-27 | End: 2024-01-27

## 2024-01-27 RX ORDER — HYDROCODONE BITARTRATE AND ACETAMINOPHEN 5; 325 MG/1; MG/1
1 TABLET ORAL EVERY 6 HOURS PRN
Qty: 10 TABLET | Refills: 0 | Status: SHIPPED | OUTPATIENT
Start: 2024-01-27 | End: 2024-02-03

## 2024-01-28 NOTE — ED QUICK NOTES
Skin tear noted to posterior right fore arm, cleaned, non-adherent dressing applied.     Pt's daughter at bedside.

## 2024-01-28 NOTE — ED PROVIDER NOTES
Patient Seen in: [facility] Emergency Department      History     Chief Complaint   Patient presents with    Fall     Stated Complaint:     Subjective:   HPI    Patient is a 73-year-old female with past medical history of dementia and prior stroke presenting from nursing home after she was found by staff on the floor of her bedroom.  They went to check on her and found her on the floor, there had been a shift change so it is not known exactly when she was not on the floor.  She has a laceration above her right eyebrow and reports pain there but denies any other complaints although history from her is limited due to her dementia.  She cannot tell me what happened or when she fell.    Objective:   History reviewed. No pertinent past medical history.           History reviewed. No pertinent surgical history.             Social History     Socioeconomic History    Marital status:    Tobacco Use    Smoking status: Never    Smokeless tobacco: Never   Vaping Use    Vaping Use: Never used   Substance and Sexual Activity    Alcohol use: Never    Drug use: Never              Review of Systems    Positive for stated complaint:   Other systems are as noted in HPI.  Constitutional and vital signs reviewed.      All other systems reviewed and negative except as noted above.    Physical Exam     ED Triage Vitals [01/27/24 2153]   BP (!) 188/77   Pulse 76   Resp 18   Temp 97.6 °F (36.4 °C)   Temp src    SpO2 98 %   O2 Device None (Room air)       Current:BP (!) 185/62   Pulse 83   Temp 97.6 °F (36.4 °C)   Resp 18   Wt 50 kg   SpO2 99%         Physical Exam  Vitals and nursing note reviewed.   Constitutional:       Appearance: She is well-developed.   HENT:      Head: Normocephalic.      Comments: There is a 1.5 cm curvilinear laceration over the lateral side of the right eyebrow with surrounding soft tissue swelling.  Eyes:      Conjunctiva/sclera: Conjunctivae normal.      Pupils: Pupils are equal, round, and  reactive to light.   Neck:      Comments: No midline cervical spine tenderness.  Cardiovascular:      Rate and Rhythm: Normal rate and regular rhythm.      Heart sounds: Normal heart sounds.   Pulmonary:      Effort: Pulmonary effort is normal.      Breath sounds: Normal breath sounds.   Abdominal:      General: Bowel sounds are normal.      Palpations: Abdomen is soft.   Musculoskeletal:         General: Normal range of motion.      Cervical back: Normal range of motion and neck supple.   Skin:     General: Skin is warm and dry.   Neurological:      Mental Status: She is alert.      Comments: Awake and alert, limited answers but answers appropriately.  Oriented to person and place.               ED Course     Labs Reviewed   COMP METABOLIC PANEL (14) - Abnormal; Notable for the following components:       Result Value    Glucose 287 (*)     BUN 26 (*)     Calculated Osmolality 299 (*)     All other components within normal limits   POCT GLUCOSE - Abnormal; Notable for the following components:    POC Glucose 308 (*)     All other components within normal limits   CK CREATINE KINASE (NOT CREATININE) - Normal   CBC WITH DIFFERENTIAL WITH PLATELET    Narrative:     The following orders were created for panel order CBC With Differential With Platelet.  Procedure                               Abnormality         Status                     ---------                               -----------         ------                     CBC W/ DIFFERENTIAL[[id number]]                              Final result                 Please view results for these tests on the individual orders.   RAINBOW DRAW LAVENDER   RAINBOW DRAW LIGHT GREEN   CBC W/ DIFFERENTIAL             CT BRAIN OR HEAD (12611)    Result Date: 1/27/2024  PROCEDURE:  CT BRAIN OR HEAD (78130)  COMPARISON:  None.  INDICATIONS:  fall, head injury  TECHNIQUE:  Noncontrast CT scanning is performed through the brain. Dose reduction techniques were used. Dose information is  transmitted to the ACR (American College of Radiology) NRDR (National Radiology Data Registry) which includes the Dose Index Registry.  PATIENT STATED HISTORY: (As transcribed by Technologist)  Patient fell several hours ago, currently has generalized head pain. Laceration present on right anterior head    FINDINGS:  VENTRICLES/SULCI:  Ventricles and sulci are normal in size.  INTRACRANIAL:  Encephalomalacia is present within the right frontal and temporal white matter.  A background of mild chronic microvascular ischemic changes is present. SINUSES:           There is a comminuted fracture of the posterior wall of the right maxillary sinus with extension through the anterior wall the right maxillary sinus.  There is a fracture of the right orbital floor which does not appear depressed but is comminuted.  This likely extends to the right inferior orbital foramen.  Blood products are noted within the right maxillary sinus. MASTOIDS:          No sign of acute inflammation. SKULL:             No evidence for fracture or osseous abnormality. OTHER:             None.            CONCLUSION:  No significant midline shift or mass effect.  No acute intracranial hemorrhage.  If there is persistent clinical concern then consider MRI.  There are comminuted fractures involving right maxillary sinus involving the anterior posterior walls of the right maxillary sinus as well as the right orbital floor which does not appear depressed.  There are blood products noted within the right maxillary sinus.   LOCATION:  [facility]   Dictated by (CST): Rasheed Oakley MD on 1/27/2024 at 10:48 PM     Finalized by (CST): Rasheed Oakley MD on 1/27/2024 at 10:52 PM            Laceration was anesthetized with lidocaine locally.  The wound was cleansed and irrigated copiously.  There was no visible foreign body within the wound. The wound was closed using a simple closure with 5-0 Vicryl x 2.  The quality of the closure was excellent         MDM      Patient  is a 73-year-old female nursing home resident presenting after an apparent fall.  Found on the floor of her room by staff, unknown exactly what happened or when.  She is reportedly at her baseline mental status per nursing home staff.  Laceration over her right eyebrow.  She is quite hyperglycemic at 391 as well.  We get a CT brain to rule out ICH or skull fracture.  Will check basic labs.      Update at 11:40 PM.  Labs unremarkable.  CT as above negative for acute intracranial injury.  She does have fractures involving the right maxillary sinus as well as the orbital floor.  Upon reassessment patient has no infraorbital paresthesia, no diplopia, clinically no evidence of entrapment and there is no evidence of entrapment on the CT.  Will need to follow-up with plastic surgery.  Daughter is at bedside and concurs patient is at baseline mental status and she is comfortable with the plan for discharge home.        Past Medical History-hypertension, diabetes, stroke, dementia    Differential diagnosis before testing included ICH, skull fracture    Co-morbidities that add to the complexity of management include: None    Testing ordered during this visit included labs, CT     Radiographic images  I personally reviewed the radiographs and my individual interpretation shows no ICH or skull fracture  I also reviewed the official reports that showed no ICH or skull fracture      History obtained by an independent source included from daughter at bedside            Disposition:          Discharge  I have discussed with the patient the results of test, differential diagnosis, treatment plan, warning signs and symptoms which should prompt immediate return.  They expressed understanding of these instructions and agrees to the following plan provided.  They were given written discharge instructions and agrees to return for any concerns and voiced understanding and all questions were answered.                           Medical  Decision Making      Disposition and Plan     Clinical Impression:  1. Closed fracture of right orbital floor, initial encounter ([facility])    2. Laceration of forehead, initial encounter         Disposition:  Discharge  1/27/2024 11:46 pm    Follow-up:  Macey Ramos MD  [address]  [address]  [facility] [address]  [phone]    Follow up      Sandy Parker MD  [address] [facility] [id number] [phone]    Call in 2 day(s)            Medications Prescribed:  Current Discharge Medication List        START taking these medications    Details   HYDROcodone-acetaminophen 5-325 MG Oral Tab Take 1 tablet by mouth every 6 (six) hours as needed.  Qty: 10 tablet, Refills: 0    Associated Diagnoses: Closed fracture of right orbital floor, initial encounter ([facility])

## 2024-01-28 NOTE — DISCHARGE INSTRUCTIONS
Wash wound gently with regular soap and water.  Stitches are dissolvable and will come out in about a week.  Ice packs or cool compresses to the area to help reduce swelling and bruising.  No nose blowing.  Follow-up with primary care physician as well as with the ENT provided regarding the facial fracture.  Take the hydrocodone for pain, only at night when you are going to bed as it will make you drowsy.  Otherwise just take Tylenol.

## 2024-01-28 NOTE — ED INITIAL ASSESSMENT (HPI)
Spoke to RANDALL Aguirre from [facility]. Around 8:30pm pt was found on the floor. Unsure how pt fell, unsure if LOC. Pt at her baseline. Pt on aspirin and clopidogrel.     Nurse [phone]

## 2024-01-28 NOTE — ED INITIAL ASSESSMENT (HPI)
Patient had a mechanical fall today. She fell and hit her head and has a right laceration above the right eye. Possibly LOC. High blood sugar per EMS of 391. Past history of stroke/ afib and COPD.

## 2024-02-19 ENCOUNTER — LAB REQUISITION (OUTPATIENT)
Dept: LAB | Facility: HOSPITAL | Age: 74
End: 2024-02-19
Attending: FAMILY MEDICINE
Payer: MEDICARE

## 2024-02-19 DIAGNOSIS — Z00.00 ENCOUNTER FOR GENERAL ADULT MEDICAL EXAMINATION WITHOUT ABNORMAL FINDINGS: ICD-10-CM

## 2024-02-19 LAB
BILIRUB UR QL STRIP.AUTO: NEGATIVE
CLARITY UR REFRACT.AUTO: CLEAR
GLUCOSE UR STRIP.AUTO-MCNC: >1000 MG/DL
HYALINE CASTS #/AREA URNS AUTO: PRESENT /LPF
KETONES UR STRIP.AUTO-MCNC: NEGATIVE MG/DL
LEUKOCYTE ESTERASE UR QL STRIP.AUTO: NEGATIVE
NITRITE UR QL STRIP.AUTO: NEGATIVE
PH UR STRIP.AUTO: 5.5 [PH] (ref 5–8)
PROT UR STRIP.AUTO-MCNC: NEGATIVE MG/DL
RBC UR QL AUTO: NEGATIVE
SP GR UR STRIP.AUTO: 1.02 (ref 1–1.03)
UROBILINOGEN UR STRIP.AUTO-MCNC: NORMAL MG/DL

## 2024-02-19 PROCEDURE — 81015 MICROSCOPIC EXAM OF URINE: CPT

## 2024-02-19 PROCEDURE — 81001 URINALYSIS AUTO W/SCOPE: CPT

## 2024-02-19 PROCEDURE — 87086 URINE CULTURE/COLONY COUNT: CPT

## 2024-02-20 ENCOUNTER — TELEPHONE (OUTPATIENT)
Dept: ENDOCRINOLOGY CLINIC | Facility: CLINIC | Age: 74
End: 2024-02-20

## 2024-02-20 NOTE — TELEPHONE ENCOUNTER
Amanda rn with [facility] calling for mutual patient blood sugar was 80 at 7:00 am today. Patient was given 4 oz orange and given long acting insulin and re-checked at 8:30 am and was 174. Patient was checked before lunch at 1:00 am and at 128. Just an fyi, and will pass on to next on boarding staff, thanks.

## 2024-02-21 NOTE — TELEPHONE ENCOUNTER
Spoke with RANDALL Frazier to relay message below - RANDALL Frazier stated understanding that if patient continues to have low BG - family will need to bring reader to clinic for download   RANDALL Frazier stated she will be with patient tomorrow and will update other staff on plan to monitor for low BG readings

## 2024-02-21 NOTE — TELEPHONE ENCOUNTER
Spoke with RN Freddie (P: [phone]). She states blood sugar at 1108 prior to lunch was 56 via freestyle rangel CGM. Patient was given 8 oz of juice. At time of call it had been 15 minutes since juice administration. So I had her recheck sugar with finger stick, it was 194, patient on her way to lunch.       Onset of hypoglycemia: today    BG levels (RN only: please print out CGM and/or pump report if patient is wearing one): CGM not connected to clinic. Using reader.     Date      Fasting     before lunch     before dinner,    HS  2/21       305            56  2/20       80              128                  100                   221     Pattern of hypoglycemia (occurring mostly when/random?): Low blood sugar today at lunch. Sugar of 80 fasting yesterday, juice was administered and BG went up to 174    Symptoms (RN only: dizziness, diaphoresis, shaky hands, tachycardia): No symptoms    Acute illness: no    Hypoglycemia Mx/Rule of 15: Yes RNs are following this rule, however giving 8 oz of juice at a time.     Change in Diet (RN only: change in appetite/eating/skipping meals): No change    List Medications/Compliance:     Lantus 8 units every morning and 5 units every evening ---> taking    Novolog according to scale:  --> confirmed scale with nurse, 3 scales for 3 meals    Breakfast   Glucose 120-150 5 units  151-199 7 units  200-249 10 units  250-300 10 units  300-400 14 units     Lunch:   120-150 2 units  151-199 3 units  200-249 5 units   250-299 6 units  300-349 7 units   350-400 8 units     Dinner:  120-150  2 units  151-199  3 units  200-249 5 units  250-299 6 units  300-349 8 units  350-450 10 units

## 2024-02-21 NOTE — TELEPHONE ENCOUNTER
Sera from [facility] states pt blood sugar levels was just at 56 and gave pt orange juice attempting Rn now please follow up ph [phone]

## 2024-02-21 NOTE — TELEPHONE ENCOUNTER
Ok, notes.  Is this a one time episode of hypoglycemia?  then would not recommend changing dose.  If episode occurs again then please ask sister to bring Dorian reader to clinic for download.

## 2024-02-22 ENCOUNTER — PATIENT MESSAGE (OUTPATIENT)
Dept: ENDOCRINOLOGY CLINIC | Facility: CLINIC | Age: 74
End: 2024-02-22

## 2024-02-23 ENCOUNTER — MED REC SCAN ONLY (OUTPATIENT)
Dept: ENDOCRINOLOGY CLINIC | Facility: CLINIC | Age: 74
End: 2024-02-23

## 2024-02-23 ENCOUNTER — TELEPHONE (OUTPATIENT)
Dept: ENDOCRINOLOGY CLINIC | Facility: CLINIC | Age: 74
End: 2024-02-23

## 2024-02-23 NOTE — TELEPHONE ENCOUNTER
RN calling from [facility].  She states pt's BS is at 407.  She is requesting to speak with MD / RN ASAP with orders.  Please call

## 2024-02-23 NOTE — TELEPHONE ENCOUNTER
Ok, noted.  Agree with the 8 units.  Yes lets have the family drop off reader if possible. Thanks.

## 2024-02-23 NOTE — TELEPHONE ENCOUNTER
Called and spoke with Rosi (lead of insurance paperwork) from [employer]. They advised that paperwork was faxed to insurance but they received a response which required a prior authorization. In the prior authorization inquired about in warranty with another pump. Due to patients current tandem pump being in warranty till 6/2025, [employer] did not submit the paperwork as it would get denied    Reviewed with Dr Raza. Responded to pt advising that typically the paperwork needs to come from the pump company (as they do the billing, codes, and submit the order to DME for supplies), but could try faxing if fax number is provided

## 2024-02-23 NOTE — TELEPHONE ENCOUNTER
Yanelis Juarez, RN 2/23/2024 9:25 AM CST      ----- Message -----  From: Maite Cavazos  Sent: 2/22/2024 9:56 AM CST  To: Denice Velásquez Clinical Staff  Subject: [employer]     Mary,  I hope you are well. I’m following up regarding the [employer] pump on behalf of my sister.   The punchline first, and then I will add detail. [employer] never submitted the paperwork to Medicare for approval. After speaking to Medicare several times, they said the paperwork (sent initially to [employer]) should be sent directly to Medicare by Dr. Raza's office.  I had a half dozen calls with [employer] from October through November. Several of those calls were just factually incorrect. [employer] fulfillment is through distributors. The non-approval for the new pump didn’t come from Medicare; it came from one of [employer] distributors. Initially, they were ready to ship the pump. When I asked if Medicare had given their approval, they said no. The distributor typically ships the pump out to the patient, and down the road, if Medicare refuses approval the financial responsibility shifts to the distributor or the patient. So, in this case, none of the distributors would ship the pump and take the financial responsibility if approval was denied.   So once again, Medicare said the paperwork (sent initially to [employer]) should be sent directly to Medicare by Dr. Raza's office. Could you please confirm when the paperwork is sent out?   Thank you,  Chandan Olguin

## 2024-02-23 NOTE — TELEPHONE ENCOUNTER
Please see below. Would you like the patient's family to drop off her rangel reader for download? Patient's brother is currently there now visiting.     RN had called on Wednesday with sugars as well. At that time, dose was not changed and regimen was as follows:    Lantus 8 units every morning and 5 units every evening     Novolog according to scale:      Breakfast   Glucose 120-150 5 units  151-199 7 units  200-249 10 units  250-300 10 units  300-400 14 units     Lunch:   120-150 2 units  151-199 3 units  200-249 5 units   250-299 6 units  300-349 7 units   350-400 8 units      Dinner:  120-150  2 units  151-199  3 units  200-249 5 units  250-299 6 units  300-349 8 units  350-450 10 units     Returned call to nurse Maggie. When she checked Maite's sugar with the rangel reader prior to lunch it read \"high\", so she double checked with fingerstick and it came back at 407. The lunch sliding scale goes up to 400, so she gave 8 units of novolog already.     This morning fasting sugar was low: 65. She was given orange juice and blood sugar was corrected to 276 prior to breakfast, therefore she received 10 units per breakfast scale with meal this morning. She confirmed with the documentation. Confirmed still taking lantus 8 units every morning and 5 units every evening.     Informed nurse will confer with MD and call back with instructions. Patient has already received 8 units of novolog and is on her way to lunch.

## 2024-02-23 NOTE — TELEPHONE ENCOUNTER
All paperwork completed (LMN, SMN/ketones, face sheet, 2 A1C, 2 OV) and placed in brown folder. Awaiting fax number from brother. Updated Virgen PEREIRA as brother is dropping off  on Monday incase he can provide fax number

## 2024-02-23 NOTE — TELEPHONE ENCOUNTER
VILMA Serrano at [facility] to call clinic  Spoke to patient's brother Chandan to relay message below - Chandan stated he just got home from [facility] - he stated he will go by there first thing on Monday and bring patient's reader to [facility] for download

## 2024-02-26 NOTE — TELEPHONE ENCOUNTER
Increase Lantus 8 units every morning and 6 units every evening     Novolog according to scale:      Breakfast   Glucose 120-150 5 units  151-199 8 units  200-249 12 units  250-300 12 units  300-400 14 units     Lunch:   120-150 2 units  151-199 3 units  200-249 5 units   250-299 6 units  300-349 7 units   350-400 8 units      Dinner:  120-150  3 units  151-199  4 units  200-249 6 units  250-299 7 units  300-349 8 units  350-450 10 units

## 2024-02-26 NOTE — TELEPHONE ENCOUNTER
Chandan dropped off reader. He was unable to stay but stated would be happy to have RN call him with adjustments. RN to call Sunil Nash with adjustments as well.     Report downloaded and placed in Dr. Raza's Folder for review. Current doses are as per below:      Lantus 8 units every morning and 5 units every evening     Novolog according to scale:      Breakfast   Glucose 120-150 5 units  151-199 7 units  200-249 10 units  250-300 10 units  300-400 14 units     Lunch:   120-150 2 units  151-199 3 units  200-249 5 units   250-299 6 units  300-349 7 units   350-400 8 units      Dinner:  120-150  2 units  151-199  3 units  200-249 5 units  250-299 6 units  300-349 8 units  350-450 10 units    FYI Dr. Raza, I tried calling the patient's nurse Deanna x 2 but have been unable to reach her. Per below, pre lunch sugar was 467 and patient was given 8 units of novolog.

## 2024-02-26 NOTE — TELEPHONE ENCOUNTER
SHERRY Hernandez, I spoke with patient's brother Chandan today. He is going to call insurance and try to get a fax number for the appeal and let us know. Paperwork is in the brown RN folder for holding.

## 2024-02-26 NOTE — TELEPHONE ENCOUNTER
Maggie from [facility] states pt blood sugar levels went up to 467 but she gave 8 units of insulin to help regulate please note

## 2024-02-26 NOTE — TELEPHONE ENCOUNTER
Called [facility] and spoke with nurse Ashley.   She requested all insulin orders be faxed in writing.   Faxed letter with insulin orders to: [phone]  Confirmation received [phone]  Provided update call to patient's brother Chandan. Left message that insulin adjustment orders have been faxed to [facility].

## 2024-02-26 NOTE — TELEPHONE ENCOUNTER
Patient's brother Chandan Olguin should be dropping off [employer] reader today for download.    Returned call to DONNA clark.

## 2024-02-29 RX ORDER — PEN NEEDLE, DIABETIC, SAFETY 30 GX3/16"
NEEDLE, DISPOSABLE MISCELLANEOUS
Qty: 500 EACH | Refills: 1 | Status: SHIPPED | OUTPATIENT
Start: 2024-02-29

## 2024-02-29 NOTE — TELEPHONE ENCOUNTER
LOV: 01/08/2024    RTC: 3 Months    FU: No FU Appt Scheduled    6 Month Supply Pending      Please approve if applicable

## 2024-02-29 NOTE — TELEPHONE ENCOUNTER
Patient's brother calling regards medication refill pt is out. States pt gets 5 shots a day, 0 day supply. States needs to be sent to [facility] on file. Please call.       MONICA aut[employer]ield Duo (needles)

## 2024-03-13 PROCEDURE — 81015 MICROSCOPIC EXAM OF URINE: CPT

## 2024-03-13 PROCEDURE — 82043 UR ALBUMIN QUANTITATIVE: CPT

## 2024-03-13 PROCEDURE — 82570 ASSAY OF URINE CREATININE: CPT

## 2024-03-14 ENCOUNTER — LAB REQUISITION (OUTPATIENT)
Dept: LAB | Facility: HOSPITAL | Age: 74
End: 2024-03-14
Payer: MEDICARE

## 2024-03-14 DIAGNOSIS — E03.9 HYPOTHYROIDISM, UNSPECIFIED: ICD-10-CM

## 2024-03-14 DIAGNOSIS — E55.9 VITAMIN D DEFICIENCY, UNSPECIFIED: ICD-10-CM

## 2024-03-14 DIAGNOSIS — E53.8 DEFICIENCY OF OTHER SPECIFIED B GROUP VITAMINS: ICD-10-CM

## 2024-03-14 DIAGNOSIS — I10 ESSENTIAL (PRIMARY) HYPERTENSION: ICD-10-CM

## 2024-03-14 DIAGNOSIS — R69 ILLNESS, UNSPECIFIED: ICD-10-CM

## 2024-03-14 DIAGNOSIS — E78.5 HYPERLIPIDEMIA, UNSPECIFIED: ICD-10-CM

## 2024-03-14 LAB
ALBUMIN SERPL-MCNC: 3.7 G/DL (ref 3.4–5)
ALBUMIN/GLOB SERPL: 1.3 {RATIO} (ref 1–2)
ALP LIVER SERPL-CCNC: 87 U/L
ALT SERPL-CCNC: 24 U/L
ANION GAP SERPL CALC-SCNC: 4 MMOL/L (ref 0–18)
AST SERPL-CCNC: 8 U/L (ref 15–37)
BASOPHILS # BLD AUTO: 0.04 X10(3) UL (ref 0–0.2)
BASOPHILS NFR BLD AUTO: 0.6 %
BILIRUB SERPL-MCNC: 0.6 MG/DL (ref 0.1–2)
BUN BLD-MCNC: 30 MG/DL (ref 9–23)
CALCIUM BLD-MCNC: 9 MG/DL (ref 8.5–10.1)
CHLORIDE SERPL-SCNC: 107 MMOL/L (ref 98–112)
CHOLEST SERPL-MCNC: 152 MG/DL (ref ?–200)
CO2 SERPL-SCNC: 26 MMOL/L (ref 21–32)
CREAT BLD-MCNC: 1 MG/DL
CREAT UR-SCNC: 197 MG/DL
EGFRCR SERPLBLD CKD-EPI 2021: 59 ML/MIN/1.73M2 (ref 60–?)
EOSINOPHIL # BLD AUTO: 0.23 X10(3) UL (ref 0–0.7)
EOSINOPHIL NFR BLD AUTO: 3.6 %
ERYTHROCYTE [DISTWIDTH] IN BLOOD BY AUTOMATED COUNT: 12.5 %
EST. AVERAGE GLUCOSE BLD GHB EST-MCNC: 226 MG/DL (ref 68–126)
FASTING PATIENT LIPID ANSWER: YES
FASTING STATUS PATIENT QL REPORTED: YES
GLOBULIN PLAS-MCNC: 2.8 G/DL (ref 2.8–4.4)
GLUCOSE BLD-MCNC: 387 MG/DL (ref 70–99)
HBA1C MFR BLD: 9.5 % (ref ?–5.7)
HCT VFR BLD AUTO: 34.3 %
HDLC SERPL-MCNC: 77 MG/DL (ref 40–59)
HGB BLD-MCNC: 11.9 G/DL
IMM GRANULOCYTES # BLD AUTO: 0.01 X10(3) UL (ref 0–1)
IMM GRANULOCYTES NFR BLD: 0.2 %
LDLC SERPL CALC-MCNC: 66 MG/DL (ref ?–100)
LYMPHOCYTES # BLD AUTO: 1.51 X10(3) UL (ref 1–4)
LYMPHOCYTES NFR BLD AUTO: 23.9 %
MCH RBC QN AUTO: 31.4 PG (ref 26–34)
MCHC RBC AUTO-ENTMCNC: 34.7 G/DL (ref 31–37)
MCV RBC AUTO: 90.5 FL
MICROALBUMIN UR-MCNC: 2.68 MG/DL
MICROALBUMIN/CREAT 24H UR-RTO: 13.6 UG/MG (ref ?–30)
MONOCYTES # BLD AUTO: 0.45 X10(3) UL (ref 0.1–1)
MONOCYTES NFR BLD AUTO: 7.1 %
NEUTROPHILS # BLD AUTO: 4.09 X10 (3) UL (ref 1.5–7.7)
NEUTROPHILS # BLD AUTO: 4.09 X10(3) UL (ref 1.5–7.7)
NEUTROPHILS NFR BLD AUTO: 64.6 %
NONHDLC SERPL-MCNC: 75 MG/DL (ref ?–130)
OSMOLALITY SERPL CALC.SUM OF ELEC: 306 MOSM/KG (ref 275–295)
PLATELET # BLD AUTO: 265 10(3)UL (ref 150–450)
POTASSIUM SERPL-SCNC: 4.5 MMOL/L (ref 3.5–5.1)
PROT SERPL-MCNC: 6.5 G/DL (ref 6.4–8.2)
RBC # BLD AUTO: 3.79 X10(6)UL
SODIUM SERPL-SCNC: 137 MMOL/L (ref 136–145)
TRIGL SERPL-MCNC: 40 MG/DL (ref 30–149)
TSI SER-ACNC: 1.3 MIU/ML (ref 0.36–3.74)
VIT B12 SERPL-MCNC: 364 PG/ML (ref 193–986)
VIT D+METAB SERPL-MCNC: 45.3 NG/ML (ref 30–100)
VLDLC SERPL CALC-MCNC: 6 MG/DL (ref 0–30)
WBC # BLD AUTO: 6.3 X10(3) UL (ref 4–11)

## 2024-03-14 PROCEDURE — 82306 VITAMIN D 25 HYDROXY: CPT

## 2024-03-14 PROCEDURE — 83036 HEMOGLOBIN GLYCOSYLATED A1C: CPT

## 2024-03-14 PROCEDURE — 80053 COMPREHEN METABOLIC PANEL: CPT

## 2024-03-14 PROCEDURE — 85025 COMPLETE CBC W/AUTO DIFF WBC: CPT

## 2024-03-14 PROCEDURE — 80061 LIPID PANEL: CPT

## 2024-03-14 PROCEDURE — 82607 VITAMIN B-12: CPT

## 2024-03-14 PROCEDURE — 84443 ASSAY THYROID STIM HORMONE: CPT

## 2024-03-24 ENCOUNTER — HOSPITAL ENCOUNTER (EMERGENCY)
Facility: HOSPITAL | Age: 74
Discharge: HOME | End: 2024-03-24
Attending: EMERGENCY MEDICINE
Payer: MEDICARE

## 2024-03-24 ENCOUNTER — APPOINTMENT (OUTPATIENT)
Dept: GENERAL RADIOLOGY | Facility: HOSPITAL | Age: 74
End: 2024-03-24
Attending: EMERGENCY MEDICINE
Payer: MEDICARE

## 2024-03-24 VITALS
DIASTOLIC BLOOD PRESSURE: 64 MMHG | RESPIRATION RATE: 17 BRPM | OXYGEN SATURATION: 100 % | HEART RATE: 67 BPM | TEMPERATURE: 98 F | BODY MASS INDEX: 19 KG/M2 | HEIGHT: 64 IN | SYSTOLIC BLOOD PRESSURE: 134 MMHG

## 2024-03-24 DIAGNOSIS — N30.01 ACUTE CYSTITIS WITH HEMATURIA: Primary | ICD-10-CM

## 2024-03-24 DIAGNOSIS — R55 SYNCOPE AND COLLAPSE: ICD-10-CM

## 2024-03-24 DIAGNOSIS — R73.9 HYPERGLYCEMIA: ICD-10-CM

## 2024-03-24 LAB
ALBUMIN SERPL-MCNC: 3.4 G/DL (ref 3.4–5)
ALBUMIN/GLOB SERPL: 1 {RATIO} (ref 1–2)
ALP LIVER SERPL-CCNC: 85 U/L
ALT SERPL-CCNC: 14 U/L
ANION GAP SERPL CALC-SCNC: 10 MMOL/L (ref 0–18)
AST SERPL-CCNC: 6 U/L (ref 15–37)
ATRIAL RATE: 63 BPM
BASE EXCESS BLDV CALC-SCNC: 1.3 MMOL/L
BASOPHILS # BLD AUTO: 0.04 X10(3) UL (ref 0–0.2)
BASOPHILS NFR BLD AUTO: 0.4 %
BILIRUB SERPL-MCNC: 0.5 MG/DL (ref 0.1–2)
BILIRUB UR QL STRIP.AUTO: NEGATIVE
BUN BLD-MCNC: 26 MG/DL (ref 9–23)
CA-I BLD-SCNC: 1.17 MMOL/L (ref 0.95–1.32)
CALCIUM BLD-MCNC: 9.2 MG/DL (ref 8.5–10.1)
CHLORIDE SERPL-SCNC: 105 MMOL/L (ref 98–112)
CO2 SERPL-SCNC: 25 MMOL/L (ref 21–32)
COLOR UR AUTO: YELLOW
CREAT BLD-MCNC: 1.2 MG/DL
EGFRCR SERPLBLD CKD-EPI 2021: 48 ML/MIN/1.73M2 (ref 60–?)
EOSINOPHIL # BLD AUTO: 0.05 X10(3) UL (ref 0–0.7)
EOSINOPHIL NFR BLD AUTO: 0.6 %
ERYTHROCYTE [DISTWIDTH] IN BLOOD BY AUTOMATED COUNT: 12.5 %
FLUAV + FLUBV RNA SPEC NAA+PROBE: NEGATIVE
FLUAV + FLUBV RNA SPEC NAA+PROBE: NEGATIVE
GLOBULIN PLAS-MCNC: 3.5 G/DL (ref 2.8–4.4)
GLUCOSE BLD-MCNC: 115 MG/DL (ref 70–99)
GLUCOSE BLD-MCNC: 173 MG/DL (ref 70–99)
GLUCOSE BLD-MCNC: 266 MG/DL (ref 70–99)
GLUCOSE BLD-MCNC: 293 MG/DL (ref 70–99)
GLUCOSE BLD-MCNC: 296 MG/DL (ref 70–99)
GLUCOSE BLD-MCNC: 507 MG/DL (ref 70–99)
GLUCOSE UR STRIP.AUTO-MCNC: >1000 MG/DL
HCO3 BLDV-SCNC: 24.7 MEQ/L (ref 22–26)
HCT VFR BLD AUTO: 34.6 %
HGB BLD-MCNC: 11.2 G/DL
HYALINE CASTS #/AREA URNS AUTO: PRESENT /LPF
IMM GRANULOCYTES # BLD AUTO: 0.04 X10(3) UL (ref 0–1)
IMM GRANULOCYTES NFR BLD: 0.4 %
KETONES UR STRIP.AUTO-MCNC: 40 MG/DL
LACTATE BLD-SCNC: 4.4 MMOL/L (ref 0.5–2)
LEUKOCYTE ESTERASE UR QL STRIP.AUTO: 250
LYMPHOCYTES # BLD AUTO: 1.34 X10(3) UL (ref 1–4)
LYMPHOCYTES NFR BLD AUTO: 14.9 %
MCH RBC QN AUTO: 30.3 PG (ref 26–34)
MCHC RBC AUTO-ENTMCNC: 32.4 G/DL (ref 31–37)
MCV RBC AUTO: 93.5 FL
MONOCYTES # BLD AUTO: 0.59 X10(3) UL (ref 0.1–1)
MONOCYTES NFR BLD AUTO: 6.6 %
NEUTROPHILS # BLD AUTO: 6.93 X10 (3) UL (ref 1.5–7.7)
NEUTROPHILS # BLD AUTO: 6.93 X10(3) UL (ref 1.5–7.7)
NEUTROPHILS NFR BLD AUTO: 77.1 %
NITRITE UR QL STRIP.AUTO: NEGATIVE
OSMOLALITY SERPL CALC.SUM OF ELEC: 306 MOSM/KG (ref 275–295)
OXYHGB MFR BLDV: 50.6 % (ref 72–78)
P AXIS: 24 DEGREES
P-R INTERVAL: 136 MS
PCO2 BLDV: 47 MM HG (ref 38–50)
PH BLDV: 7.37 [PH] (ref 7.33–7.43)
PH UR STRIP.AUTO: 5.5 [PH] (ref 5–8)
PLATELET # BLD AUTO: 321 10(3)UL (ref 150–450)
PO2 BLDV: 33 MM HG (ref 30–50)
POTASSIUM BLD-SCNC: 3.8 MMOL/L (ref 3.6–5.1)
POTASSIUM SERPL-SCNC: 3.5 MMOL/L (ref 3.5–5.1)
PROT SERPL-MCNC: 6.9 G/DL (ref 6.4–8.2)
PROT UR STRIP.AUTO-MCNC: NEGATIVE MG/DL
Q-T INTERVAL: 430 MS
QRS DURATION: 72 MS
QTC CALCULATION (BEZET): 440 MS
R AXIS: -1 DEGREES
RBC # BLD AUTO: 3.7 X10(6)UL
RBC #/AREA URNS AUTO: >10 /HPF
RSV RNA SPEC NAA+PROBE: NEGATIVE
SARS-COV-2 RNA RESP QL NAA+PROBE: NOT DETECTED
SODIUM BLD-SCNC: 139 MMOL/L (ref 135–145)
SODIUM SERPL-SCNC: 140 MMOL/L (ref 136–145)
SP GR UR STRIP.AUTO: 1.03 (ref 1–1.03)
T AXIS: 44 DEGREES
UROBILINOGEN UR STRIP.AUTO-MCNC: NORMAL MG/DL
VENTRICULAR RATE: 63 BPM
WBC # BLD AUTO: 9 X10(3) UL (ref 4–11)
WBC #/AREA URNS AUTO: >50 /HPF

## 2024-03-24 PROCEDURE — 82803 BLOOD GASES ANY COMBINATION: CPT | Performed by: EMERGENCY MEDICINE

## 2024-03-24 PROCEDURE — 0241U SARS-COV-2/FLU A AND B/RSV BY PCR (GENEXPERT): CPT | Performed by: EMERGENCY MEDICINE

## 2024-03-24 PROCEDURE — 84132 ASSAY OF SERUM POTASSIUM: CPT | Performed by: EMERGENCY MEDICINE

## 2024-03-24 PROCEDURE — 85025 COMPLETE CBC W/AUTO DIFF WBC: CPT | Performed by: EMERGENCY MEDICINE

## 2024-03-24 PROCEDURE — 81001 URINALYSIS AUTO W/SCOPE: CPT | Performed by: EMERGENCY MEDICINE

## 2024-03-24 PROCEDURE — 99285 EMERGENCY DEPT VISIT HI MDM: CPT

## 2024-03-24 PROCEDURE — 82330 ASSAY OF CALCIUM: CPT | Performed by: EMERGENCY MEDICINE

## 2024-03-24 PROCEDURE — 80053 COMPREHEN METABOLIC PANEL: CPT | Performed by: EMERGENCY MEDICINE

## 2024-03-24 PROCEDURE — 82962 GLUCOSE BLOOD TEST: CPT

## 2024-03-24 PROCEDURE — 96360 HYDRATION IV INFUSION INIT: CPT

## 2024-03-24 PROCEDURE — 71045 X-RAY EXAM CHEST 1 VIEW: CPT | Performed by: EMERGENCY MEDICINE

## 2024-03-24 PROCEDURE — 93005 ELECTROCARDIOGRAM TRACING: CPT

## 2024-03-24 PROCEDURE — 84295 ASSAY OF SERUM SODIUM: CPT | Performed by: EMERGENCY MEDICINE

## 2024-03-24 PROCEDURE — 93010 ELECTROCARDIOGRAM REPORT: CPT

## 2024-03-24 PROCEDURE — 96361 HYDRATE IV INFUSION ADD-ON: CPT

## 2024-03-24 RX ORDER — INSULIN ASPART 100 [IU]/ML
0.2 INJECTION, SOLUTION INTRAVENOUS; SUBCUTANEOUS ONCE
Status: DISCONTINUED | OUTPATIENT
Start: 2024-03-24 | End: 2024-03-24

## 2024-03-24 RX ORDER — SULFAMETHOXAZOLE AND TRIMETHOPRIM 800; 160 MG/1; MG/1
1 TABLET ORAL ONCE
Status: COMPLETED | OUTPATIENT
Start: 2024-03-24 | End: 2024-03-24

## 2024-03-24 RX ORDER — SULFAMETHOXAZOLE AND TRIMETHOPRIM 800; 160 MG/1; MG/1
1 TABLET ORAL 2 TIMES DAILY
Qty: 14 TABLET | Refills: 0 | Status: SHIPPED | OUTPATIENT
Start: 2024-03-24 | End: 2024-03-31

## 2024-03-24 RX ORDER — INSULIN ASPART 100 [IU]/ML
0.1 INJECTION, SOLUTION INTRAVENOUS; SUBCUTANEOUS ONCE
Status: COMPLETED | OUTPATIENT
Start: 2024-03-24 | End: 2024-03-24

## 2024-03-24 NOTE — ED INITIAL ASSESSMENT (HPI)
Patient brought in by EMS for hyperglycemia, 347 for EMS. Patient has recently been ill. Patient took 14 units of insulin PTA.

## 2024-03-24 NOTE — ED QUICK NOTES
Family at bedside. Updated on status. Md notified about blood sugar. Will continue to monitor. Pt eat and drinking.

## 2024-03-24 NOTE — ED PROVIDER NOTES
Patient Seen in: [facility] Emergency Department      History     Chief Complaint   Patient presents with    Hyperglycemia     Stated Complaint: hyperglycemia    Subjective:   HPI    Patient here for hyperglycemia and a syncopal event.  She is in the dining room earlier today sitting in her chair when she slumped over and there is reportedly some shaking movement.  No falls no head trauma.  An Accu-Chek was done and it was high.  She was given supplemental insulin and sent to the ER.  On arrival here the patient states that she has been urinating more frequently than normal but denies polydipsia polyphagia.    Reports cough last night as well as a fever, though this was not reported in nurse nurse report.      Denies any pain presently    Has a medical history as noted below     Lives at [facility]    Objective:   Past Medical History:   Diagnosis Date    Depression     Essential hypertension     Type 1 diabetes mellitus (HCC)               History reviewed. No pertinent surgical history.             Social History     Socioeconomic History    Marital status:    Tobacco Use    Smoking status: Former     Types: Cigarettes    Smokeless tobacco: Never   Vaping Use    Vaping Use: Never used   Substance and Sexual Activity    Alcohol use: Never    Drug use: Never              Review of Systems    Positive for stated complaint: hyperglycemia  Other systems are as noted in HPI.  Constitutional and vital signs reviewed.      All other systems reviewed and negative except as noted above.    Physical Exam     ED Triage Vitals [03/24/24 0832]   /48   Pulse 70   Resp 24   Temp 97.9 °F (36.6 °C)   Temp src Oral   SpO2 98 %   O2 Device None (Room air)       Current:/73   Pulse 63   Temp 97.9 °F (36.6 °C) (Oral)   Resp 20   Ht 162.6 cm (5' 4\")   SpO2 97%   BMI 18.92 kg/m²         Physical Exam    Physical Exam   Constitutional: Awake, alert, well appearing  Head: Normocephalic and  atraumatic.   Eyes: Conjunctivae are normal. Pupils are equal, round, and reactive to light.   Neck: Normal range of motion. Neck supple.   Cardiovascular: Normal rate, regular rhythm  Pulmonary/Chest: Normal effort.  No accessory muscle use.  No clubbing, no cyanosis.  Abdominal: Soft. Bowel sounds are normal.   Neurological: Pt is alert and oriented to person, place, and time. no cranial nerve deficits  Skin: Skin is warm and dry.      Belly benign multiple exams  No CVA tenderness    ED Course     Labs Reviewed   COMP METABOLIC PANEL (14) - Abnormal; Notable for the following components:       Result Value    Glucose 296 (*)     BUN 26 (*)     Creatinine 1.20 (*)     Calculated Osmolality 306 (*)     eGFR-Cr 48 (*)     AST 6 (*)     All other components within normal limits   VBG PANEL WITH ELECTROLYTES - Abnormal; Notable for the following components:    Venous O2Hb 50.6 (*)     Lactic Acid (Blood Gas) 4.4 (*)     All other components within normal limits   URINALYSIS, ROUTINE - Abnormal; Notable for the following components:    Clarity Urine Ex.Turbid (*)     Glucose Urine >1000 (*)     Ketones Urine 40 (*)     Blood Urine 2+ (*)     Leukocyte Esterase Urine 250 (*)     WBC Urine >50 (*)     RBC Urine >10 (*)     Bacteria Urine 2+ (*)     Squamous Epi. Cells Few (*)     Hyaline Casts Present (*)     All other components within normal limits   POCT GLUCOSE - Abnormal; Notable for the following components:    POC Glucose 507 (*)     All other components within normal limits   POCT GLUCOSE - Abnormal; Notable for the following components:    POC Glucose 293 (*)     All other components within normal limits   POCT GLUCOSE - Abnormal; Notable for the following components:    POC Glucose 266 (*)     All other components within normal limits   POCT GLUCOSE - Abnormal; Notable for the following components:    POC Glucose 115 (*)     All other components within normal limits   CBC W/ DIFFERENTIAL - Abnormal; Notable for  the following components:    RBC 3.70 (*)     HGB 11.2 (*)     HCT 34.6 (*)     All other components within normal limits   SARS-COV-2/FLU A AND B/RSV BY PCR (GENEXPERT) - Normal    Narrative:     This test is intended for the qualitative detection and differentiation of SARS-CoV-2, influenza A, influenza B, and respiratory syncytial virus (RSV) viral RNA in nasopharyngeal or nares swabs from individuals suspected of respiratory viral infection consistent with COVID-19 by their healthcare provider. Signs and symptoms of respiratory viral infection due to SARS-CoV-2, influenza, and RSV can be similar.    Test performed using the Xpert Xpress SARS-CoV-2/FLU/RSV (real time RT-PCR)  assay on the [employer] instrument, [employer], [place] [address].   This test is being used under the Food and Drug Administration's Emergency Use Authorization.    The authorized Fact Sheet for Healthcare Providers for this assay is available upon request from the laboratory.   CBC WITH DIFFERENTIAL WITH PLATELET    Narrative:     The following orders were created for panel order CBC With Differential With Platelet.  Procedure                               Abnormality         Status                     ---------                               -----------         ------                     CBC W/ DIFFERENTIAL[[id number]]          Abnormal            Final result                 Please view results for these tests on the individual orders.   RAINBOW DRAW LAVENDER   RAINBOW DRAW LIGHT GREEN   RAINBOW DRAW BLUE     EKG    Rate, intervals and axes as noted on EKG Report.  Rate: 63  Rhythm: Sinus Rhythm  Reading: Sinus rhythm without acute ischemia                 Blood work reviewed, hyperglycemia without DKA, UA does suggest infection, slight KAMINI    Medications   sulfamethoxazole-trimethoprim DS (Bactrim DS) 800-160 MG per tab 1 tablet (has no administration in time range)   sodium chloride 0.9 % IV bolus 1,000 mL (0 mL Intravenous Stopped 3/24/24  1221)   insulin aspart (NovoLOG) 100 Units/mL vial 5 Units (5 Units Subcutaneous Given 3/24/24 0928)         Sugars improved    XR CHEST AP PORTABLE  (CPT=71045)    Result Date: 3/24/2024  CONCLUSION:  Hyperinflation both lungs consistent with COPD.  Normal heart size and pulmonary vascularity.  Atelectasis in the lung bases.  Cardiac monitoring device projected over left cardiac border.  No focal consolidation or pneumothorax.   LOCATION:  [facility]      Dictated by (CST): Johana Bee MD on 3/24/2024 at 11:25 AM     Finalized by (CST): Johana Bee MD on 3/24/2024 at 11:26 AM          Patient was able to ambulate at her baseline here in the ER.  She felt well and was eating a sandwich near the end of her stay.         MDM          Differential diagnoses considered: Hypovolemic syncope, vagal syncope, orthostatic syncope, doubt primary cardiac.  Hyperglycemia, DKA all considered    -Hyperglycemia without DKA improved after supplemental insulin and fluids  Urine does suggest UTI,-will treat with Bactrim first dose here    -Syncope likely potentiated by above.    Discussed plan of care with patient and daughter at bedside, they feel comfortable with discharge home today.      *My independent interpretation of radiographs: No pneumonia noted on chest x-ray  *Prescription for Bactrim sent to the pharmacy  *Discussion of ongoing management of this patient's care included: n/a  *Comorbidities contributing to the complexity of decision making: Insulin-dependent diabetes  *External charts reviewed: n/a  *Additional sources of history: n/a    Shared decision making was done by: patient, myself.                                     Medical Decision Making      Disposition and Plan     Clinical Impression:  1. Acute cystitis with hematuria    2. Syncope and collapse    3. Hyperglycemia         Disposition:  Discharge  3/24/2024  1:07 pm    Follow-up:  Macey Ramos MD  [address] [place] [address]  [id number]  [phone]    Follow up            Medications Prescribed:  Current Discharge Medication List        START taking these medications    Details   sulfamethoxazole-trimethoprim -160 MG Oral Tab per tablet Take 1 tablet by mouth 2 (two) times daily for 7 days.  Qty: 14 tablet, Refills: 0

## 2024-04-10 ENCOUNTER — TELEPHONE (OUTPATIENT)
Dept: ENDOCRINOLOGY CLINIC | Facility: CLINIC | Age: 74
End: 2024-04-10

## 2024-04-10 NOTE — TELEPHONE ENCOUNTER
Amanda/[facility] called to request orders for Tresiba insulin pen.  They received it from [employer] and can't give medication without order.  Please fax to [phone].

## 2024-04-10 NOTE — TELEPHONE ENCOUNTER
Letter faxed to [facility] with insulin doses:  Tresiba 8 units every morning and 6 units every evening     Novolog according to scale:      Breakfast   Glucose 120-150 5 units  151-199 8 units  200-249 12 units  250-300 12 units  300-400 14 units     Lunch:   120-150 2 units  151-199 3 units  200-249 5 units   250-299 6 units  300-349 7 units   350-400 8 units      Dinner:  120-150  3 units  151-199  4 units  200-249 6 units  250-299 7 units  300-349 8 units  350-450 10 units

## 2024-04-16 NOTE — TELEPHONE ENCOUNTER
Spoke to [address] and claims per their record patient has been getting both AM and PM doses of lantus. RN also confirmed regimen below to be accurate and what facility is following. Lantus             AM: 8 units            PM: 5 units     Novolog  IF glucose under 120 before meal - give 6 ounces of OJ then give insulin for meal     IF patient is not eating at least 30 gram of carbohdrate then give 2 unit less of insulin      CHANGE      Breakfast   Glucose 120-150 3 units  151-199 5 units  200-249 7 units  250-400 9 units     Lunch and dinner:   Glucose 120-150 2 units  151-199 3 units  200-249 5 units   250-299 6 units  300-349 7 units   350-400 8 units     Amanda will fax the glucose readings to [phone]. 1 pair

## 2024-04-28 ENCOUNTER — LAB REQUISITION (OUTPATIENT)
Dept: LAB | Facility: HOSPITAL | Age: 74
End: 2024-04-28
Payer: MEDICARE

## 2024-04-28 DIAGNOSIS — Z00.00 ENCOUNTER FOR GENERAL ADULT MEDICAL EXAMINATION WITHOUT ABNORMAL FINDINGS: ICD-10-CM

## 2024-04-28 LAB — HYALINE CASTS #/AREA URNS AUTO: PRESENT /LPF

## 2024-04-28 PROCEDURE — 81015 MICROSCOPIC EXAM OF URINE: CPT

## 2024-04-28 PROCEDURE — 87086 URINE CULTURE/COLONY COUNT: CPT

## 2024-05-01 ENCOUNTER — TELEPHONE (OUTPATIENT)
Dept: ENDOCRINOLOGY CLINIC | Facility: CLINIC | Age: 74
End: 2024-05-01

## 2024-05-01 NOTE — TELEPHONE ENCOUNTER
Julien Jackson [place] calling to inform fax has not been received. Please fax to alternative fax [phone], thanks.

## 2024-05-01 NOTE — TELEPHONE ENCOUNTER
First fax was failed. Resent fax to new fax number provided. Awaiting confirmation.   Confirmation received: Success.

## 2024-05-01 NOTE — TELEPHONE ENCOUNTER
Dr Raza,    Spoke to Renetta RN at [facility] with concerns of low blood sugar of 76 at lunch time today. However, elevated blood sugar readings noted on days prior.     Nurse from [facility] to fax over blood glucose readings.     Hyperglycemia     Onset of hyperglycemia: several days    BG levels (please print out CGM and/or pump report if patient is wearing one):     4/28 730 am 392 11am 354 435pm 116 8pm 274  4/29 730 am 184 12pm 98 830pm 325  4/30 730 am 215 12 pm 167 5pm 279  8pm 127   5/1 730 am 261 9 am 176 11 am 76    Symptoms (RN only: N/V, abdominal pain and/or radiating to the back, blurry vision, increased urinary frequency, blurred vision, CP, SOB): increased urination.    Pattern of hyperglycemia: No    Steroid therapy: No    Acute Illness: No    Change in Diet: Denies. Has diet soda and string cheese in her room.    List DM Medications/Compliance:    Tresiba 8 Units in the am and 6 Units in the pm    Novolog TID with sliding scale, max dose 27 units.       Recently changed from lantus to tresiba due to insurance on 1/28/24

## 2024-05-01 NOTE — TELEPHONE ENCOUNTER
Please fax over the new insulin instructions below.  I changed the breakfast dosing. The glucose was likely higher due to transition to Tresiba. It takes a few days for that insulin to become steady. Thanks.     Tresiba 8 units subcutaneous QAM; 6 units subcutaneous QPM      Please see the updated Novolog sliding scale:      Novolog sliding scale:  Breakfast:   Glucose 120-150 5 units  151-199 5 units  200-249  7 units  250-300 8 units  300-400 10 units     Lunch:   120-150 2 units  151-199 3 units  200-249 5 units   250-299 6 units  300-349 7 units   350-400 8 units      Dinner:   120-150 2 units  151-199 3 units  200-249 5 units  250-299 6 units  300-349 8 units  350-450 10 units

## 2024-05-01 NOTE — TELEPHONE ENCOUNTER
Called Renetta and received fax number: [phone]  New dosing regimen faxed. Awaiting confirmation.

## 2024-05-01 NOTE — TELEPHONE ENCOUNTER
Renetta, at [facility] calling regarding patients glucose was 76, this morning and 261 this afternoon.. No insulin for lunch, patient was given orange juice and will eat lunch now. No symptoms of hypercalcemia. Please call at [phone],thanks.

## 2024-05-02 ENCOUNTER — TELEPHONE (OUTPATIENT)
Dept: ENDOCRINOLOGY CLINIC | Facility: CLINIC | Age: 74
End: 2024-05-02

## 2024-05-02 NOTE — TELEPHONE ENCOUNTER
Called pt RN and was notified that fasting BG was 397, she gave insulin dose according to scale, 8 units of Tresiba and 14 units of Novolog and states that pt had just finished eating BF. BG 30 min after meal is 354. Pt consumed pancakes with no syrup, sausage, a coffee. Per RN pt does not have any symptoms except increased urinary frequency. RN stated she is trying to obtain a urine sample to rule out infection but samples have been contaminated due to pt defecating at the same time. Pt RN aware to contact office with any questions and to follow provided sliding scale.

## 2024-05-02 NOTE — TELEPHONE ENCOUNTER
Patient's RN paged overnight with glucose level above 500.  Recommended 5 units of Novolog x1.  Also discussed importance of checking for infection causing higher glucose levels.      Please follow up on glucose this AM. Thanks.

## 2024-05-03 ENCOUNTER — LAB REQUISITION (OUTPATIENT)
Dept: LAB | Facility: HOSPITAL | Age: 74
End: 2024-05-03
Payer: MEDICARE

## 2024-05-03 DIAGNOSIS — G47.51 CONFUSIONAL AROUSALS: ICD-10-CM

## 2024-05-03 LAB
BILIRUB UR QL STRIP.AUTO: NEGATIVE
COLOR UR AUTO: YELLOW
GLUCOSE UR STRIP.AUTO-MCNC: >1000 MG/DL
KETONES UR STRIP.AUTO-MCNC: NEGATIVE MG/DL
LEUKOCYTE ESTERASE UR QL STRIP.AUTO: 25
NITRITE UR QL STRIP.AUTO: NEGATIVE
PH UR STRIP.AUTO: 6 [PH] (ref 5–8)
PROT UR STRIP.AUTO-MCNC: NEGATIVE MG/DL
RBC UR QL AUTO: NEGATIVE
SP GR UR STRIP.AUTO: 1.02 (ref 1–1.03)
UROBILINOGEN UR STRIP.AUTO-MCNC: NORMAL MG/DL

## 2024-05-03 PROCEDURE — 87186 SC STD MICRODIL/AGAR DIL: CPT | Performed by: FAMILY MEDICINE

## 2024-05-03 PROCEDURE — 87086 URINE CULTURE/COLONY COUNT: CPT | Performed by: FAMILY MEDICINE

## 2024-05-03 PROCEDURE — 87077 CULTURE AEROBIC IDENTIFY: CPT | Performed by: FAMILY MEDICINE

## 2024-05-03 PROCEDURE — 81001 URINALYSIS AUTO W/SCOPE: CPT | Performed by: FAMILY MEDICINE

## 2024-05-13 ENCOUNTER — LAB REQUISITION (OUTPATIENT)
Dept: LAB | Facility: HOSPITAL | Age: 74
End: 2024-05-13

## 2024-05-13 DIAGNOSIS — Z00.00 ENCOUNTER FOR GENERAL ADULT MEDICAL EXAMINATION WITHOUT ABNORMAL FINDINGS: ICD-10-CM

## 2024-05-13 LAB
BILIRUB UR QL STRIP.AUTO: NEGATIVE
CLARITY UR REFRACT.AUTO: CLEAR
GLUCOSE UR STRIP.AUTO-MCNC: 1000 MG/DL
KETONES UR STRIP.AUTO-MCNC: NEGATIVE MG/DL
LEUKOCYTE ESTERASE UR QL STRIP.AUTO: NEGATIVE
NITRITE UR QL STRIP.AUTO: NEGATIVE
PH UR STRIP.AUTO: 6.5 [PH] (ref 5–8)
PROT UR STRIP.AUTO-MCNC: NEGATIVE MG/DL
RBC UR QL AUTO: NEGATIVE
SP GR UR STRIP.AUTO: 1.01 (ref 1–1.03)
UROBILINOGEN UR STRIP.AUTO-MCNC: NORMAL MG/DL

## 2024-05-13 PROCEDURE — 81003 URINALYSIS AUTO W/O SCOPE: CPT

## 2024-05-18 ENCOUNTER — HOSPITAL ENCOUNTER (OUTPATIENT)
Facility: HOSPITAL | Age: 74
Setting detail: OBSERVATION
Discharge: HOME | End: 2024-05-20
Attending: EMERGENCY MEDICINE | Admitting: HOSPITALIST

## 2024-05-18 ENCOUNTER — APPOINTMENT (OUTPATIENT)
Dept: GENERAL RADIOLOGY | Facility: HOSPITAL | Age: 74
End: 2024-05-18
Attending: EMERGENCY MEDICINE

## 2024-05-18 DIAGNOSIS — R07.9 CHEST PAIN OF UNCERTAIN ETIOLOGY: Primary | ICD-10-CM

## 2024-05-18 PROBLEM — E10.9 TYPE 1 DIABETES MELLITUS (HCC): Status: ACTIVE | Noted: 2023-09-29

## 2024-05-18 LAB
ALBUMIN SERPL-MCNC: 3.3 G/DL (ref 3.4–5)
ALBUMIN/GLOB SERPL: 1.1 {RATIO} (ref 1–2)
ALP LIVER SERPL-CCNC: 116 U/L
ALT SERPL-CCNC: 23 U/L
ANION GAP SERPL CALC-SCNC: 3 MMOL/L (ref 0–18)
AST SERPL-CCNC: 17 U/L (ref 15–37)
ATRIAL RATE: 71 BPM
BASOPHILS # BLD AUTO: 0.07 X10(3) UL (ref 0–0.2)
BASOPHILS NFR BLD AUTO: 1.2 %
BILIRUB SERPL-MCNC: 0.3 MG/DL (ref 0.1–2)
BUN BLD-MCNC: 27 MG/DL (ref 9–23)
CALCIUM BLD-MCNC: 8.8 MG/DL (ref 8.5–10.1)
CHLORIDE SERPL-SCNC: 105 MMOL/L (ref 98–112)
CO2 SERPL-SCNC: 30 MMOL/L (ref 21–32)
CREAT BLD-MCNC: 0.72 MG/DL
D DIMER PPP FEU-MCNC: <0.27 UG/ML FEU (ref ?–0.74)
EGFRCR SERPLBLD CKD-EPI 2021: 88 ML/MIN/1.73M2 (ref 60–?)
EOSINOPHIL # BLD AUTO: 0.28 X10(3) UL (ref 0–0.7)
EOSINOPHIL NFR BLD AUTO: 5 %
ERYTHROCYTE [DISTWIDTH] IN BLOOD BY AUTOMATED COUNT: 12.3 %
GLOBULIN PLAS-MCNC: 3 G/DL (ref 2.8–4.4)
GLUCOSE BLD-MCNC: 136 MG/DL (ref 70–99)
GLUCOSE BLD-MCNC: 275 MG/DL (ref 70–99)
GLUCOSE BLD-MCNC: 282 MG/DL (ref 70–99)
GLUCOSE BLD-MCNC: 290 MG/DL (ref 70–99)
GLUCOSE BLD-MCNC: 313 MG/DL (ref 70–99)
GLUCOSE BLD-MCNC: 366 MG/DL (ref 70–99)
GLUCOSE BLD-MCNC: 381 MG/DL (ref 70–99)
GLUCOSE BLD-MCNC: 414 MG/DL (ref 70–99)
GLUCOSE BLD-MCNC: 415 MG/DL (ref 70–99)
HCT VFR BLD AUTO: 31.7 %
HGB BLD-MCNC: 11 G/DL
IMM GRANULOCYTES # BLD AUTO: 0.01 X10(3) UL (ref 0–1)
IMM GRANULOCYTES NFR BLD: 0.2 %
LYMPHOCYTES # BLD AUTO: 2.07 X10(3) UL (ref 1–4)
LYMPHOCYTES NFR BLD AUTO: 36.9 %
MCH RBC QN AUTO: 31.1 PG (ref 26–34)
MCHC RBC AUTO-ENTMCNC: 34.7 G/DL (ref 31–37)
MCV RBC AUTO: 89.5 FL
MONOCYTES # BLD AUTO: 0.38 X10(3) UL (ref 0.1–1)
MONOCYTES NFR BLD AUTO: 6.8 %
NEUTROPHILS # BLD AUTO: 2.8 X10 (3) UL (ref 1.5–7.7)
NEUTROPHILS # BLD AUTO: 2.8 X10(3) UL (ref 1.5–7.7)
NEUTROPHILS NFR BLD AUTO: 49.9 %
OSMOLALITY SERPL CALC.SUM OF ELEC: 301 MOSM/KG (ref 275–295)
P AXIS: 81 DEGREES
P-R INTERVAL: 188 MS
PLATELET # BLD AUTO: 298 10(3)UL (ref 150–450)
POTASSIUM SERPL-SCNC: 4.1 MMOL/L (ref 3.5–5.1)
PROT SERPL-MCNC: 6.3 G/DL (ref 6.4–8.2)
Q-T INTERVAL: 398 MS
QRS DURATION: 78 MS
QTC CALCULATION (BEZET): 432 MS
R AXIS: 30 DEGREES
RBC # BLD AUTO: 3.54 X10(6)UL
SODIUM SERPL-SCNC: 138 MMOL/L (ref 136–145)
T AXIS: 66 DEGREES
TROPONIN I SERPL HS-MCNC: 7 NG/L
VENTRICULAR RATE: 71 BPM
WBC # BLD AUTO: 5.6 X10(3) UL (ref 4–11)

## 2024-05-18 PROCEDURE — 71045 X-RAY EXAM CHEST 1 VIEW: CPT | Performed by: EMERGENCY MEDICINE

## 2024-05-18 PROCEDURE — 99223 1ST HOSP IP/OBS HIGH 75: CPT | Performed by: HOSPITALIST

## 2024-05-18 RX ORDER — MELATONIN
3 NIGHTLY PRN
Status: DISCONTINUED | OUTPATIENT
Start: 2024-05-18 | End: 2024-05-20

## 2024-05-18 RX ORDER — NICOTINE POLACRILEX 4 MG
30 LOZENGE BUCCAL
Status: DISCONTINUED | OUTPATIENT
Start: 2024-05-18 | End: 2024-05-20

## 2024-05-18 RX ORDER — ENOXAPARIN SODIUM 100 MG/ML
40 INJECTION SUBCUTANEOUS DAILY
Status: DISCONTINUED | OUTPATIENT
Start: 2024-05-18 | End: 2024-05-20

## 2024-05-18 RX ORDER — INSULIN DEGLUDEC 100 U/ML
8 INJECTION, SOLUTION SUBCUTANEOUS DAILY
Status: DISCONTINUED | OUTPATIENT
Start: 2024-05-19 | End: 2024-05-20

## 2024-05-18 RX ORDER — ALBUTEROL SULFATE 2.5 MG/3ML
2.5 SOLUTION RESPIRATORY (INHALATION) EVERY 6 HOURS PRN
Status: DISCONTINUED | OUTPATIENT
Start: 2024-05-18 | End: 2024-05-20

## 2024-05-18 RX ORDER — SENNOSIDES 8.6 MG
17.2 TABLET ORAL NIGHTLY PRN
Status: DISCONTINUED | OUTPATIENT
Start: 2024-05-18 | End: 2024-05-20

## 2024-05-18 RX ORDER — ESCITALOPRAM OXALATE 10 MG/1
10 TABLET ORAL DAILY
Status: DISCONTINUED | OUTPATIENT
Start: 2024-05-18 | End: 2024-05-20

## 2024-05-18 RX ORDER — LOSARTAN POTASSIUM 50 MG/1
50 TABLET ORAL DAILY
Status: DISCONTINUED | OUTPATIENT
Start: 2024-05-18 | End: 2024-05-20

## 2024-05-18 RX ORDER — ONDANSETRON 2 MG/ML
4 INJECTION INTRAMUSCULAR; INTRAVENOUS EVERY 6 HOURS PRN
Status: DISCONTINUED | OUTPATIENT
Start: 2024-05-18 | End: 2024-05-20

## 2024-05-18 RX ORDER — INSULIN DEGLUDEC 100 U/ML
6 INJECTION, SOLUTION SUBCUTANEOUS NIGHTLY
Status: DISCONTINUED | OUTPATIENT
Start: 2024-05-18 | End: 2024-05-20

## 2024-05-18 RX ORDER — OXYBUTYNIN CHLORIDE 5 MG/1
5 TABLET, EXTENDED RELEASE ORAL DAILY
Status: DISCONTINUED | OUTPATIENT
Start: 2024-05-18 | End: 2024-05-20

## 2024-05-18 RX ORDER — POLYETHYLENE GLYCOL 3350 17 G/17G
17 POWDER, FOR SOLUTION ORAL DAILY PRN
Status: DISCONTINUED | OUTPATIENT
Start: 2024-05-18 | End: 2024-05-20

## 2024-05-18 RX ORDER — INSULIN DEGLUDEC 100 U/ML
6 INJECTION, SOLUTION SUBCUTANEOUS NIGHTLY
COMMUNITY

## 2024-05-18 RX ORDER — FLUTICASONE FUROATE AND VILANTEROL 100; 25 UG/1; UG/1
1 POWDER RESPIRATORY (INHALATION) DAILY
Status: DISCONTINUED | OUTPATIENT
Start: 2024-05-18 | End: 2024-05-20

## 2024-05-18 RX ORDER — NICOTINE POLACRILEX 4 MG
15 LOZENGE BUCCAL
Status: DISCONTINUED | OUTPATIENT
Start: 2024-05-18 | End: 2024-05-18

## 2024-05-18 RX ORDER — METOCLOPRAMIDE HYDROCHLORIDE 5 MG/ML
10 INJECTION INTRAMUSCULAR; INTRAVENOUS EVERY 8 HOURS PRN
Status: DISCONTINUED | OUTPATIENT
Start: 2024-05-18 | End: 2024-05-20

## 2024-05-18 RX ORDER — CLOPIDOGREL BISULFATE 75 MG/1
75 TABLET ORAL DAILY
Status: DISCONTINUED | OUTPATIENT
Start: 2024-05-18 | End: 2024-05-20

## 2024-05-18 RX ORDER — ATORVASTATIN CALCIUM 40 MG/1
40 TABLET, FILM COATED ORAL NIGHTLY
Status: DISCONTINUED | OUTPATIENT
Start: 2024-05-18 | End: 2024-05-20

## 2024-05-18 RX ORDER — CETIRIZINE HYDROCHLORIDE 10 MG/1
10 TABLET ORAL DAILY
Status: DISCONTINUED | OUTPATIENT
Start: 2024-05-18 | End: 2024-05-20

## 2024-05-18 RX ORDER — DEXTROSE MONOHYDRATE 25 G/50ML
50 INJECTION, SOLUTION INTRAVENOUS
Status: DISCONTINUED | OUTPATIENT
Start: 2024-05-18 | End: 2024-05-20

## 2024-05-18 RX ORDER — ACETAMINOPHEN 500 MG
500 TABLET ORAL EVERY 4 HOURS PRN
Status: DISCONTINUED | OUTPATIENT
Start: 2024-05-18 | End: 2024-05-20

## 2024-05-18 RX ORDER — DEXTROSE MONOHYDRATE 25 G/50ML
50 INJECTION, SOLUTION INTRAVENOUS
Status: DISCONTINUED | OUTPATIENT
Start: 2024-05-18 | End: 2024-05-18

## 2024-05-18 RX ORDER — AMLODIPINE BESYLATE 2.5 MG/1
2.5 TABLET ORAL DAILY
Status: DISCONTINUED | OUTPATIENT
Start: 2024-05-18 | End: 2024-05-20

## 2024-05-18 RX ORDER — ENEMA 19; 7 G/133ML; G/133ML
1 ENEMA RECTAL ONCE AS NEEDED
Status: DISCONTINUED | OUTPATIENT
Start: 2024-05-18 | End: 2024-05-20

## 2024-05-18 RX ORDER — INSULIN DEGLUDEC 100 U/ML
12 INJECTION, SOLUTION SUBCUTANEOUS DAILY
Status: DISCONTINUED | OUTPATIENT
Start: 2024-05-18 | End: 2024-05-18

## 2024-05-18 RX ORDER — NICOTINE POLACRILEX 4 MG
15 LOZENGE BUCCAL
Status: DISCONTINUED | OUTPATIENT
Start: 2024-05-18 | End: 2024-05-20

## 2024-05-18 RX ORDER — ASPIRIN 81 MG/1
81 TABLET ORAL DAILY
Status: DISCONTINUED | OUTPATIENT
Start: 2024-05-18 | End: 2024-05-20

## 2024-05-18 RX ORDER — BISACODYL 10 MG
10 SUPPOSITORY, RECTAL RECTAL
Status: DISCONTINUED | OUTPATIENT
Start: 2024-05-18 | End: 2024-05-20

## 2024-05-18 RX ORDER — NICOTINE POLACRILEX 4 MG
30 LOZENGE BUCCAL
Status: DISCONTINUED | OUTPATIENT
Start: 2024-05-18 | End: 2024-05-18

## 2024-05-18 NOTE — ED PROVIDER NOTES
Patient Seen in: [facility] Emergency Department      History     Chief Complaint   Patient presents with    Chest Pain Angina     Stated Complaint: chest pain    Subjective:   HPI    Patient is a 74-year-old female presents emergency room from nursing home by EMS with chest pain.  Patient had sharp chest pain left-sided and substernal region 1 hour ago.  Complained of mild shortness of breath.  Denied vomiting or diaphoresis.  Given aspirin and sublingual nitro spray prior to evaluation by EMS.  Patient denies fever or cough.  Cardiac risk factors, diabetes, hypertension.    Objective:   Past Medical History:    Depression    Essential hypertension    Type 1 diabetes mellitus (HCC)              History reviewed. No pertinent surgical history.             Social History     Socioeconomic History    Marital status:    Tobacco Use    Smoking status: Former     Types: Cigarettes    Smokeless tobacco: Never   Vaping Use    Vaping status: Never Used   Substance and Sexual Activity    Alcohol use: Never    Drug use: Never     Social Determinants of Health     Food Insecurity: No Food Insecurity (8/9/2022)    [facility]    Hunger Vital Sign     Worried About Running Out of Food in the Last Year: Never true     Ran Out of Food in the Last Year: Never true   Transportation Needs: No Transportation Needs (7/15/2023)    [facility]    PRAPARE - Transportation     Lack of Transportation (Medical): No     Lack of Transportation (Non-Medical): No    [facility]    Social Connections    [facility]    Housing Stability              Review of Systems    Positive for stated complaint: chest pain  Other systems are as noted in HPI.  Constitutional and vital signs reviewed.      All  other systems reviewed and negative except as noted above.    Physical Exam     ED Triage Vitals [05/18/24 0135]   /68   Pulse 79   Resp 16   Temp 97.9 °F (36.6 °C)   Temp src Temporal   SpO2 93 %   O2 Device None (Room air)       Current Vitals:   Vital Signs  BP: 129/68  Pulse: 66  Resp: 16  Temp: 97.9 °F (36.6 °C)  Temp src: Temporal    Oxygen Therapy  SpO2: 94 %  O2 Device: None (Room air)            Physical Exam    GENERAL: No acute distress, well appearing and non-toxic, Alert and oriented X 3   HEENT: Normocephalic, atraumatic.  Moist mucous membranes.  Pupils equal round reactive to light and accommodation, extraocular motion is intact, sclerae white, conjunctiva is pink.  Oropharynx is unremarkable, no exudate.  NECK: Supple, trachea midline, no lymphadenopathy.   LUNG: Lungs clear to auscultation bilaterally, no wheezing, no rales, no rhonchi.  CARDIOVASCULAR: Regular rate and rhythm.  Normal S1S2.  No S3S4 or murmur.  ABDOMEN: Bowel sounds are present. Soft. nondistended, no pulsatile masses. nontender  MUSCULOSKELETAL: No calf tenderness.  Dorsalis and Posterior Tibial pulses present. No clubbing. No cyanosis.  No edema.   SKIN EXAMINATIoN: Warm and dry with normal appearance.  No rashes or lesions.  NEUROLOGICAL:  Motor strength intact all groups.  normal sensation, speech intact    ED Course     Labs Reviewed   COMP METABOLIC PANEL (14) - Abnormal; Notable for the following components:       Result Value    Glucose 275 (*)     BUN 27 (*)     Calculated Osmolality 301 (*)     Total Protein 6.3 (*)     Albumin 3.3 (*)     All other components within normal limits   POCT GLUCOSE - Abnormal; Notable for the following components:    POC Glucose 282 (*)     All other components within normal limits   CBC W/ DIFFERENTIAL - Abnormal; Notable for the following components:    RBC 3.54 (*)     HGB 11.0 (*)     HCT 31.7 (*)     All other components within normal limits   TROPONIN I HIGH SENSITIVITY - Normal    D-DIMER - Normal   CBC WITH DIFFERENTIAL WITH PLATELET    Narrative:     The following orders were created for panel order CBC With Differential With Platelet.  Procedure                               Abnormality         Status                     ---------                               -----------         ------                     CBC W/ DIFFERENTIAL[[id number]]          Abnormal            Final result                 Please view results for these tests on the individual orders.   Hemoglobin 11.0.  D-dimer and troponin normal  EKG    Rate, intervals and axes as noted on EKG Report.  Rate: 71  Rhythm: Sinus Rhythm  Reading: No acute changes                 I personally reviewed xray films of chest and independent interpretation shows no acute process or pneumonia.  I also reviewed formal xray report as read by radiology with findings below:    Chest x-ray read by [facility] is negative for pneumonia       MDM      Patient is a 74-year-old female presents emergency room for evaluation of chest pain.  Differentials ACS, PE, pneumonia.  Patient had EKG performed which was normal.  Troponin and D-dimer normal.  Labs showed mild anemia with hemoglobin 11.0.  Chest x-ray negative for acute process.  Denies any recent provocative testing of the heart.  Will admit for observation.  Case discussed with hospitalist, Dr. Dela Cruz.  Admission disposition: 5/18/2024  4:04 AM                                        Medical Decision Making      Disposition and Plan     Clinical Impression:  1. Chest pain of uncertain etiology         Disposition:  Admit  5/18/2024  4:04 am    Follow-up:  No follow-up provider specified.        Medications Prescribed:  Current Discharge Medication List                            Hospital Problems       Present on Admission  Date Reviewed: 1/8/2024            ICD-10-CM Noted POA    * (Principal) Chest pain of uncertain etiology R07.9 5/18/2024 Unknown

## 2024-05-18 NOTE — H&P
[facility]     Maite Cavazos Patient Status:  Observation    1950 MRN [id number]   Location [facility] 8NE-A Attending Cecilia Schaefer MD   Hosp Day # 0 PCP Macey Ramos MD     Chief Complaint: Chest pain    Subjective:    History of Present Illness:     Maite Cavazos is a 74 year old female with history of vascular dementia, type 1 diabetes, hypertension, hyperlipidemia depression presents emergency room with chest pain started last night substernal with no radiation.  Started before coming to the emergency room.  Patient did states she felt a little bit of shortness of breath when having the pain.  She denies any dizziness, lightheadedness, diaphoresis, nausea, vomiting.  Reported the patient was given aspirin and sublingual nitro spray prior to paramedics coming.    History/Other:    Past Medical History:  Past Medical History:    Anxiety state    Depression    Essential hypertension    High blood pressure    Type 1 diabetes mellitus (HCC)     Past Surgical History:   History reviewed. No pertinent surgical history.   Family History:   History reviewed. No pertinent family history.  Social History:    reports that she has quit smoking. Her smoking use included cigarettes. She has never used smokeless tobacco. She reports that she does not drink alcohol and does not use drugs.     Allergies:   Allergies   Allergen Reactions     Covid-19 Mrna Vacc (Moderna) OTHER (SEE COMMENTS)     DKA and stroke    DKA and stroke   DKA and stroke    Iodine (Topical) OTHER (SEE COMMENTS)    Radiology Contrast Iodinated Dyes OTHER (SEE COMMENTS)       Medications:    Current Facility-Administered Medications on File Prior to Encounter   Medication Dose Route Frequency Provider Last Rate Last Admin    [COMPLETED] sodium chloride 0.9 % IV bolus 1,000 mL  1,000 mL Intravenous Once Joi Dolan MD   Stopped at 24 1221    [COMPLETED] insulin aspart (NovoLOG) 100 Units/mL vial  5 Units  0.1 Units/kg Subcutaneous Once Joi Dolan MD   5 Units at 24 0928    [COMPLETED] sulfamethoxazole-trimethoprim DS (Bactrim DS) 800-160 MG per tab 1 tablet  1 tablet Oral Once Joi Dolan MD   1 tablet at 24 1428     Current Outpatient Medications on File Prior to Encounter   Medication Sig Dispense Refill    Insulin Degludec (TRESIBA FLEXTOUCH) 100 UNIT/ML Subcutaneous Solution Pen-injector Inject 0.12 mL (12 Units total) into the skin daily. 15 mL 3    insulin aspart (NOVOLOG FLEXPEN) 100 Units/mL Subcutaneous Solution Pen-injector Inject 3 times daily with meals in accordance to sliding scale. Max daily dose 27 units. 27 mL 0    Budesonide-Formoterol Fumarate 80-4.5 MCG/ACT Inhalation Aerosol Inhale 2 puffs into the lungs 2 (two) times daily.      clopidogrel 75 MG Oral Tab Take 1 tablet (75 mg total) by mouth daily.      escitalopram 10 MG Oral Tab Take 1 tablet (10 mg total) by mouth daily.      losartan 50 MG Oral Tab Take 1 tablet (50 mg total) by mouth daily.      montelukast 10 MG Oral Tab Take 1 tablet (10 mg total) by mouth daily.      oxybutynin ER 5 MG Oral Tablet 24 Hr Take 1 tablet (5 mg total) by mouth daily.      Acetaminophen ER (MAPAP ARTHRITIS PAIN) 650 MG Oral Tab CR Take 1 tablet (650 mg total) by mouth every 8 (eight) hours as needed for Pain.      aspirin 81 MG Oral Tab EC Take 1 tablet (81 mg total) by mouth daily.      [] sulfamethoxazole-trimethoprim -160 MG Oral Tab per tablet Take 1 tablet by mouth 2 (two) times daily for 7 days. 14 tablet 0    Insulin Pen Needle (BD AUTOSHIELD DUO) 30G X 5 MM Does not apply Misc Pt injects insulin 5 times daily 500 each 1    insulin glargine (LANTUS SOLOSTAR) 100 UNIT/ML Subcutaneous Solution Pen-injector Inject 8 Units into the skin in the morning and 8 Units before bedtime. 15 mL 0    glucagon (GVOKE HYPOPEN 2-PACK) 1 MG/0.2ML Subcutaneous SUBQ injection Inject 0.2 mL (1 mg total) into the skin.      albuterol (2.5  MG/3ML) 0.083% Inhalation Nebu Soln Inhale 3 mL into the lungs every 6 (six) hours as needed.      amLODIPine 2.5 MG Oral Tab Take 1 tablet (2.5 mg total) by mouth daily.      atorvastatin 40 MG Oral Tab Take 1 tablet (40 mg total) by mouth nightly.      cetirizine 10 MG Oral Tab Take 1 tablet (10 mg total) by mouth daily.      insulin detemir 100 UNIT/ML Subcutaneous Solution Inject 5 Units into the skin 2 (two) times daily.      cholecalciferol 50 MCG (2000 UT) Oral Cap Take 1 capsule (2,000 Units total) by mouth daily. 1 tablet everyday      ergocalciferol 1.25 MG (31537 UT) Oral Cap Take 1 capsule (50,000 Units total) by mouth once a week.      Ca Phosphate-Cholecalciferol (CALTRATE GUMMY BITES OR) Take by mouth.      glucose (GLUTOSE 15) 40% Oral Gel Take 37.5 mL (15 g total) by mouth once.      Loperamide HCl (ANTI-DIARRHEAL) 2 MG Oral Tab Take 1 tablet (2 mg total) by mouth daily.         Review of Systems:   A comprehensive review of systems was completed.    Pertinent positives and negatives noted in the HPI.    Objective:   Physical Exam:    /68   Pulse 67   Temp 97.8 °F (36.6 °C) (Oral)   Resp 16   Wt 113 lb 15.7 oz (51.7 kg)   SpO2 94%   BMI 19.56 kg/m²   General: No acute distress, Alert  Respiratory: No rhonchi, no wheezes  Cardiovascular: S1, S2. Regular rate and rhythm  Abdomen: Soft, Non-tender, non-distended, positive bowel sounds  Neuro: No new focal deficits  Extremities: No edema      Results:    Labs:      Labs Last 24 Hours:    Recent Labs   Lab 05/18/24 0210   RBC 3.54*   HGB 11.0*   HCT 31.7*   MCV 89.5   MCH 31.1   MCHC 34.7   RDW 12.3   NEPRELIM 2.80   WBC 5.6   .0       Recent Labs   Lab 05/18/24 0210   *   BUN 27*   CREATSERUM 0.72   EGFRCR 88   CA 8.8   ALB 3.3*      K 4.1      CO2 30.0   ALKPHO 116   AST 17   ALT 23   BILT 0.3   TP 6.3*       No results found for: \"PT\", \"INR\"    Recent Labs   Lab 05/18/24  0210   TROPHS 7       No results for  input(s): \"TROP\", \"PBNP\" in the last 168 hours.    No results for input(s): \"PCT\" in the last 168 hours.    Imaging: Imaging data reviewed in Epic.    Assessment & Plan:      # Chest pain  -Will rule out ACS with serial troponins, echocardiogram.    # Type 2 diabetes  - placed on hypoglycemia protocol with long-acting and correction factor insulin.    # Hypertension  -Will continue on losartan, amlodipine.    # Hyperlipidemia   -will continue on statin therapy    # Depression  -Will continue SSRI.    # Vascular dementia        Plan of care discussed with patient at bedside.    Xavi Guan, DO    Supplementary Documentation:     The 21st Century Cures Act makes medical notes like these available to patients in the interest of transparency. Please be advised this is a medical document. Medical documents are intended to carry relevant information, facts as evident, and the clinical opinion of the practitioner. The medical note is intended as peer to peer communication and may appear blunt or direct. It is written in medical language and may contain abbreviations or verbiage that are unfamiliar.

## 2024-05-18 NOTE — PROGRESS NOTES
[facility]   part of [facility]     Hospitalist Progress Note     Maite Cavazos Patient Status:  Observation    1950 MRN [id number]   Location [facility] 8NE-A Attending Cecilia Schaefer MD   Hosp Day # 0 PCP Macey Ramos MD     Chief Complaint: cp    Subjective:     Patient denies cp today    Objective:    Review of Systems:   A comprehensive review of systems was completed; pertinent positive and negatives stated in subjective.    Vital signs:  Temp:  [97.8 °F (36.6 °C)-98.5 °F (36.9 °C)] 98.5 °F (36.9 °C)  Pulse:  [66-80] 80  Resp:  [16-20] 18  BP: (129-184)/(59-81) 169/61  SpO2:  [93 %-99 %] 97 %    Physical Exam:    General: No acute distress  Respiratory: No wheezes, no rhonchi  Cardiovascular: S1, S2, regular rate and rhythm  Abdomen: Soft, Non-tender, non-distended, positive bowel sounds  Neuro: No new focal deficits.   Extremities: No edema      Diagnostic Data:    Labs:  Recent Labs   Lab 24  0210   WBC 5.6   HGB 11.0*   MCV 89.5   .0       Recent Labs   Lab 24  0210   *   BUN 27*   CREATSERUM 0.72   CA 8.8   ALB 3.3*      K 4.1      CO2 30.0   ALKPHO 116   AST 17   ALT 23   BILT 0.3   TP 6.3*       Estimated Creatinine Clearance: 55.9 mL/min (based on SCr of 0.72 mg/dL).    Recent Labs   Lab 24  0210   TROPHS 7       No results for input(s): \"PTP\", \"INR\" in the last 168 hours.               Microbiology    No results found for this visit on 24.      Imaging: Reviewed in Epic.    Medications:    amLODIPine  2.5 mg Oral Daily    aspirin  81 mg Oral Daily    atorvastatin  40 mg Oral Nightly    fluticasone furoate-vilanterol  1 puff Inhalation Daily    cetirizine  10 mg Oral Daily    clopidogrel  75 mg Oral Daily    escitalopram  10 mg Oral Daily    losartan  50 mg Oral Daily    oxybutynin ER  5 mg Oral Daily    insulin degludec  12 Units Subcutaneous Daily    insulin aspart  1-20 Units Subcutaneous TID AC and HS    insulin aspart   1-68 Units Subcutaneous TID CC    enoxaparin  40 mg Subcutaneous Daily       Assessment & Plan:      #cp resolved  -f/u [facility] EP for syncope  #EKG DDimer nl  HTN  DM type 1   start Tresiba and ISS        Cecilia Schaefer MD    Supplementary Documentation:     Quality:  DVT Mechanical Prophylaxis:   SCDs,    DVT Pharmacologic Prophylaxis   Medication    enoxaparin (Lovenox) 40 MG/0.4ML SUBQ injection 40 mg         DVT Pharmacologic prophylaxis: Aspirin 81 mg      Code Status: Not on file  Garcia: External urinary catheter in place  Garcia Duration (in days):   Central line:    ESTHER:     Discharge is dependent on: progress  At this point Ms. Cavazos is expected to be discharge to: home    The 21st Century Cures Act makes medical notes like these available to patients in the interest of transparency. Please be advised this is a medical document. Medical documents are intended to carry relevant information, facts as evident, and the clinical opinion of the practitioner. The medical note is intended as peer to peer communication and may appear blunt or direct. It is written in medical language and may contain abbreviations or verbiage that are unfamiliar.

## 2024-05-18 NOTE — PROGRESS NOTES
Called by RN d/t hyperglycemia    Plan:  Start Tresiba 12/d - PTA dose  Add Correctional 1:30  Add Carb 1:15  PTailor MD

## 2024-05-18 NOTE — ED QUICK NOTES
Orders for admission, patient is aware of plan and ready to go upstairs. Any questions, please call ED RANDALL Perez  at extension [phone].     Vaccinated?  Type of COVID test sent:  COVID Suspicion level: Low/High      Titratable drug(s) infusing:  Rate:none    LOC at time of transport:A+Ox4    Other pertinent information:    CIWA score=  NIH score=

## 2024-05-18 NOTE — ED INITIAL ASSESSMENT (HPI)
Pt arrives with c/o new onset chest pain that started approximately 1 hr pta. Pt states the pain is decreased at this time. Aspirin and nitroglycerin given PTA

## 2024-05-18 NOTE — PLAN OF CARE
Patient alert and oriented x 3. On room air. Sinus on cardiac monitor. Patient denies any chest pain or chest discomfort at this time. Patient voids, puirwick assisted and made comfortable, last BM 5/17. Admitted with  cp no pain now , trop negative , Plan of care updated, all questions answered. Safety precautions in place. Bed alarm on. Will continue to monitor tele/labs/vital signs closely.     Plan:   2d cho    Problem: Diabetes/Glucose Control  Goal: Glucose maintained within prescribed range  Description: INTERVENTIONS:  - Monitor Blood Glucose as ordered  - Assess for signs and symptoms of hyperglycemia and hypoglycemia  - Administer ordered medications to maintain glucose within target range  - Assess barriers to adequate nutritional intake and initiate nutrition consult as needed  - Instruct patient on self management of diabetes  Outcome: Progressing     Problem: CARDIOVASCULAR - ADULT  Goal: Maintains optimal cardiac output and hemodynamic stability  Description: INTERVENTIONS:  - Monitor vital signs, rhythm, and trends  - Monitor for bleeding, hypotension and signs of decreased cardiac output  - Evaluate effectiveness of vasoactive medications to optimize hemodynamic stability  - Monitor arterial and/or venous puncture sites for bleeding and/or hematoma  - Assess quality of pulses, skin color and temperature  - Assess for signs of decreased coronary artery perfusion - ex. Angina  - Evaluate fluid balance, assess for edema, trend weights  Outcome: Progressing     Problem: GASTROINTESTINAL - ADULT  Goal: Maintains or returns to baseline bowel function  Description: INTERVENTIONS:  - Assess bowel function  - Maintain adequate hydration with IV or PO as ordered and tolerated  - Evaluate effectiveness of GI medications  - Encourage mobilization and activity  - Obtain nutritional consult as needed  - Establish a toileting routine/schedule  - Consider collaborating with pharmacy to review patient's medication  profile  Outcome: Progressing     Problem: GENITOURINARY - ADULT  Goal: Absence of urinary retention  Description: INTERVENTIONS:  - Assess patient’s ability to void and empty bladder  - Monitor intake/output and perform bladder scan as needed  - Follow urinary retention protocol/standard of care  - Consider collaborating with pharmacy to review patient's medication profile  - Implement strategies to promote bladder emptying  Outcome: Progressing

## 2024-05-19 ENCOUNTER — APPOINTMENT (OUTPATIENT)
Dept: CV DIAGNOSTICS | Facility: HOSPITAL | Age: 74
End: 2024-05-19
Attending: HOSPITALIST

## 2024-05-19 LAB
ANION GAP SERPL CALC-SCNC: 5 MMOL/L (ref 0–18)
ANION GAP SERPL CALC-SCNC: 9 MMOL/L (ref 0–18)
BASOPHILS # BLD AUTO: 0.06 X10(3) UL (ref 0–0.2)
BASOPHILS NFR BLD AUTO: 1.1 %
BUN BLD-MCNC: 29 MG/DL (ref 9–23)
BUN BLD-MCNC: 32 MG/DL (ref 9–23)
CALCIUM BLD-MCNC: 8.5 MG/DL (ref 8.5–10.1)
CALCIUM BLD-MCNC: 8.7 MG/DL (ref 8.5–10.1)
CHLORIDE SERPL-SCNC: 107 MMOL/L (ref 98–112)
CHLORIDE SERPL-SCNC: 109 MMOL/L (ref 98–112)
CO2 SERPL-SCNC: 17 MMOL/L (ref 21–32)
CO2 SERPL-SCNC: 24 MMOL/L (ref 21–32)
CREAT BLD-MCNC: 0.87 MG/DL
CREAT BLD-MCNC: 0.89 MG/DL
EGFRCR SERPLBLD CKD-EPI 2021: 68 ML/MIN/1.73M2 (ref 60–?)
EGFRCR SERPLBLD CKD-EPI 2021: 70 ML/MIN/1.73M2 (ref 60–?)
EOSINOPHIL # BLD AUTO: 0.19 X10(3) UL (ref 0–0.7)
EOSINOPHIL NFR BLD AUTO: 3.4 %
ERYTHROCYTE [DISTWIDTH] IN BLOOD BY AUTOMATED COUNT: 12.1 %
GLUCOSE BLD-MCNC: 174 MG/DL (ref 70–99)
GLUCOSE BLD-MCNC: 245 MG/DL (ref 70–99)
GLUCOSE BLD-MCNC: 273 MG/DL (ref 70–99)
GLUCOSE BLD-MCNC: 287 MG/DL (ref 70–99)
GLUCOSE BLD-MCNC: 323 MG/DL (ref 70–99)
GLUCOSE BLD-MCNC: 413 MG/DL (ref 70–99)
GLUCOSE BLD-MCNC: 423 MG/DL (ref 70–99)
GLUCOSE BLD-MCNC: 497 MG/DL (ref 70–99)
HCT VFR BLD AUTO: 32.9 %
HGB BLD-MCNC: 10.8 G/DL
IMM GRANULOCYTES # BLD AUTO: 0.01 X10(3) UL (ref 0–1)
IMM GRANULOCYTES NFR BLD: 0.2 %
LYMPHOCYTES # BLD AUTO: 1.8 X10(3) UL (ref 1–4)
LYMPHOCYTES NFR BLD AUTO: 32.1 %
MCH RBC QN AUTO: 30.3 PG (ref 26–34)
MCHC RBC AUTO-ENTMCNC: 32.8 G/DL (ref 31–37)
MCV RBC AUTO: 92.2 FL
MONOCYTES # BLD AUTO: 0.41 X10(3) UL (ref 0.1–1)
MONOCYTES NFR BLD AUTO: 7.3 %
NEUTROPHILS # BLD AUTO: 3.13 X10 (3) UL (ref 1.5–7.7)
NEUTROPHILS # BLD AUTO: 3.13 X10(3) UL (ref 1.5–7.7)
NEUTROPHILS NFR BLD AUTO: 55.9 %
OSMOLALITY SERPL CALC.SUM OF ELEC: 294 MOSM/KG (ref 275–295)
OSMOLALITY SERPL CALC.SUM OF ELEC: 303 MOSM/KG (ref 275–295)
PLATELET # BLD AUTO: 272 10(3)UL (ref 150–450)
POTASSIUM SERPL-SCNC: 4.2 MMOL/L (ref 3.5–5.1)
POTASSIUM SERPL-SCNC: 4.4 MMOL/L (ref 3.5–5.1)
RBC # BLD AUTO: 3.57 X10(6)UL
SODIUM SERPL-SCNC: 133 MMOL/L (ref 136–145)
SODIUM SERPL-SCNC: 138 MMOL/L (ref 136–145)
WBC # BLD AUTO: 5.6 X10(3) UL (ref 4–11)

## 2024-05-19 PROCEDURE — 93306 TTE W/DOPPLER COMPLETE: CPT | Performed by: HOSPITALIST

## 2024-05-19 PROCEDURE — 99232 SBSQ HOSP IP/OBS MODERATE 35: CPT | Performed by: HOSPITALIST

## 2024-05-19 RX ORDER — INSULIN DEGLUDEC 100 U/ML
8 INJECTION, SOLUTION SUBCUTANEOUS DAILY
Qty: 0.8 ML | Refills: 0 | Status: SHIPPED | OUTPATIENT
Start: 2024-05-19 | End: 2024-05-29

## 2024-05-19 NOTE — PHYSICAL THERAPY NOTE
PHYSICAL THERAPY EVALUATION - INPATIENT     Room Number: 8610/8610-A  Evaluation Date: 5/19/2024  Type of Evaluation: Initial  Physician Order: PT Eval and Treat    Presenting Problem: chest pain of uncertain etiology  Co-Morbidities : vascular dementia, stroke, DMT1, HTN, HLD  Reason for Therapy: Mobility Dysfunction and Discharge Planning    PHYSICAL THERAPY ASSESSMENT   Patient is currently functioning near baseline with transfers and gait.  Prior to admission, patient's baseline is she resides in Assisted Living and has assistance with bed mobility, transfers and ambulation when using a RW; uses a w/c for long distance ambulation.  Patient is requiring minimal assist as a result of the following impairments: decreased functional strength, decreased endurance/aerobic capacity, impaired standing balance, impaired coordination, decreased muscular endurance, and cognitive deficits (history of vascular dementia).  Physical Therapy will continue to follow for duration of hospitalization.    Patient will benefit from continued skilled PT Services at discharge to promote functional independence and safety with additional support and return home with home health PT. Anticipate return to her Assisted Living facility. States she was receiving PT/OT.     PLAN  PT Treatment Plan: Bed mobility;Endurance;Energy conservation;Patient education;Family education;Gait training;Coordination;Range of motion;Strengthening;Transfer training;Balance training  Rehab Potential : Good  Frequency (Obs): 3x/week  Number of Visits to Meet Established Goals: 3      CURRENT GOALS    Goal #1 Patient is able to demonstrate supine - sit EOB @ level: supervision     Goal #2 Patient is able to demonstrate transfers Sit to/from Stand at assistance level: supervision     Goal #3 Patient is able to ambulate 150 feet with assist device: walker - emerita at assistance level: supervision     Goal #4    Goal #5    Goal #6    Goal Comments: Goals  established on 2024      PHYSICAL THERAPY MEDICAL/SOCIAL HISTORY  History related to current admission: Patient is a 74 year old female admitted on 2024 from her Assisted Living facility for substernal chest pain without radiation.      HOME SITUATION  Type of Home: Assisted living facility ([facility])   Home Layout: One level                Lives With: Staff 24 hours (states she works with PT/OT; has assist for bed mobility and dressing; feeds self using her R hand)  Drives: No  Patient Owned Equipment: Rolling walker;Wheelchair (states she is able to ambulate at least 30 ft. with the RW; uses the w/c for longer distances)  Patient Regularly Uses: Glasses    Prior Level of [place]: Prior to admission, patient's baseline is she resides in Assisted Living and has assistance with bed mobility, transfers and ambulation when using a RW; uses a w/c for long distance ambulation.  States she has been working with PT/OT.     SUBJECTIVE  \"I am waiting on my grilled cheese and soup.\"       OBJECTIVE  Precautions: Bed/chair alarm  Fall Risk: High fall risk    WEIGHT BEARING RESTRICTION  Weight Bearing Restriction: None                PAIN ASSESSMENT  Ratin  Location: patient denies       COGNITION  Following Commands:  follows all commands and directions without difficulty  Initiation: cues to initiate tasks    RANGE OF MOTION AND STRENGTH ASSESSMENT  Upper extremity ROM and strength - LUE contracture from previous stroke    Lower extremity ROM is within functional limits for her R LE; L LE hemiparesis from previous stroke    Lower extremity strength is within functional limits for R LE; L LE hemiparesis      BALANCE  Static Sitting: Good  Dynamic Sitting: Fair +  Static Standing: Fair  Dynamic Standing: Fair -    ADDITIONAL TESTS                                    ACTIVITY TOLERANCE  Pulse: 67  Heart Rate Source: Monitor                   O2 WALK  Oxygen Therapy  SPO2% on Room Air at Rest:  98    NEUROLOGICAL FINDINGS  Neurological Findings: Tone              Tone: L hemiparesis; R UE contracture      AM-PAC '6-Clicks' INPATIENT SHORT FORM - BASIC MOBILITY  How much difficulty does the patient currently have...  Patient Difficulty: Turning over in bed (including adjusting bedclothes, sheets and blankets)?: A Little   Patient Difficulty: Sitting down on and standing up from a chair with arms (e.g., wheelchair, bedside commode, etc.): A Little   Patient Difficulty: Moving from lying on back to sitting on the side of the bed?: A Little   How much help from another person does the patient currently need...   Help from Another: Moving to and from a bed to a chair (including a wheelchair)?: A Little   Help from Another: Need to walk in hospital room?: A Little   Help from Another: Climbing 3-5 steps with a railing?: A Lot       AM-PAC Score:  Raw Score: 17   Approx Degree of Impairment: 50.57%   Standardized Score (AM-PAC Scale): 42.13   CMS Modifier (G-Code): CK    FUNCTIONAL ABILITY STATUS  Gait Assessment   Functional Mobility/Gait Assessment  Gait Assistance: Minimum assistance  Distance (ft): 45  Assistive Device: Rolling walker  Pattern: L Decreased stance time (L hemiparesis)    Skilled Therapy Provided     Bed Mobility:  Rolling: NT  Supine to sit: NT   Sit to supine: NT     Transfer Mobility:  Sit to stand: CGA/Sampson to RW   Stand to sit: CGA/Sampson, RW  Gait = x45 ft., RW, Sampson for navigating the RW    Therapist's Comments: Patient presents to PT sitting up in the bedside chair, waiting for lunch to be delivered and agreeable to working with therapy. Patient appears to be functioning near her baseline as she required x1 person assist for sit <> stand transfers and short distance ambulation using a RW within the halls. Would benefit from R hemiwalker trial next session as it was unavailable this session. Patient returned to sitting in the bedside chair upon return to her room, chair alarm donned, SHIVAM's  propped and all needs placed within reach. RN updated.     Exercise/Education Provided:  Functional activity tolerated  Gait training  Transfer training    Patient End of Session: Up in chair;Needs met;Call light within reach;RN aware of session/findings;All patient questions and concerns addressed;Alarm set      Patient Evaluation Complexity Level:  History Moderate - 1 or 2 personal factors and/or co-morbidities   Examination of body systems Moderate - addressing a total of 3 or more elements   Clinical Presentation Low - Stable   Clinical Decision Making Low - Stable       PT Session Time: 28 minutes  Gait Trainin minutes  Therapeutic Activity: 0 minutes  Neuromuscular Re-education: 0 minutes  Therapeutic Exercise: 0 minutes

## 2024-05-19 NOTE — PROGRESS NOTES
[facility] Cardiology  Progress Note    Maite Cavazos Patient Status:  Observation    1950 MRN [id number]   [facility] 8NE-A Attending Cecilia Schaefer MD   Hosp Day # 0 PCP Maecy Ramos MD     Impression:  Atypical CP  DM  HTN  Dyslipidemia  Vascular dementia (CVA x 7)  8s pause by event monitor (EP suggested no PPM)    Plan:  CP: Echo stable.  No further CP.  Trop negative.  Atypical in nature.  No stress needed at this time (inpatient).  If recurrent symptoms at home, can stress as an outpatient.  But would proceed conservatively for now.  DM: Per primary.  HTN: ARB, CCB.  Lipids: Statin.  CVA: Plavix.  Continue.    Ok to DC home today.  No med changes from CV perspective.  Can FU with cardiology (myself or APN) if desired.  Otherwise FU with PCP.        Subjective:  The patient denies any chest pain or dyspnea at this time.    Objective:  /65 (BP Location: Right arm)   Pulse 61   Temp 97.7 °F (36.5 °C) (Oral)   Resp 18   Wt 113 lb 15.7 oz (51.7 kg)   SpO2 98%   BMI 19.56 kg/m²   Temp (24hrs), Av.8 °F (36.6 °C), Min:97.3 °F (36.3 °C), Max:98.5 °F (36.9 °C)      Intake/Output Summary (Last 24 hours) at 2024 1230  Last data filed at 2024 1039  Gross per 24 hour   Intake 760 ml   Output 1250 ml   Net -490 ml     Wt Readings from Last 3 Encounters:   24 113 lb 15.7 oz (51.7 kg)   24 110 lb 3.7 oz (50 kg)   23 110 lb (49.9 kg)       General: Awake and alert; in no acute distress  Cardiac: Regular rate and regular rhythm; no murmurs/rubs/gallops are appreciated  Lungs: Clear to auscultation bilaterally; no accessory muscle use  Abdomen: Soft, non-tender; bowel sounds are normoactive  Extremities: No clubbing/cyanosis; moves all 4 extremities normally    Current Facility-Administered Medications   Medication Dose Route Frequency    albuterol (Ventolin) (2.5 MG/3ML) 0.083% nebulizer solution 2.5 mg  2.5 mg Nebulization Q6H PRN    amLODIPine  (Norvasc) tab 2.5 mg  2.5 mg Oral Daily    aspirin DR tab 81 mg  81 mg Oral Daily    atorvastatin (Lipitor) tab 40 mg  40 mg Oral Nightly    fluticasone furoate-vilanterol (Breo Ellipta) 100-25 MCG/ACT inhaler 1 puff  1 puff Inhalation Daily    cetirizine (ZyrTEC) tab 10 mg  10 mg Oral Daily    clopidogrel (Plavix) tab 75 mg  75 mg Oral Daily    escitalopram (Lexapro) tab 10 mg  10 mg Oral Daily    losartan (Cozaar) tab 50 mg  50 mg Oral Daily    oxybutynin ER (Ditropan-XL) 24 hr tab 5 mg  5 mg Oral Daily    insulin aspart (NovoLOG) 100 Units/mL FlexPen 1-20 Units  1-20 Units Subcutaneous TID AC and HS    insulin aspart (NovoLOG) 100 Units/mL FlexPen 1-68 Units  1-68 Units Subcutaneous TID CC    glucose (Dex4) 15 GM/59ML oral liquid 15 g  15 g Oral Q15 Min PRN    Or    glucose (Glutose) 40% oral gel 15 g  15 g Oral Q15 Min PRN    Or    glucose-vitamin C (Dex-4) chewable tab 4 tablet  4 tablet Oral Q15 Min PRN    Or    dextrose 50% injection 50 mL  50 mL Intravenous Q15 Min PRN    Or    glucose (Dex4) 15 GM/59ML oral liquid 30 g  30 g Oral Q15 Min PRN    Or    glucose (Glutose) 40% oral gel 30 g  30 g Oral Q15 Min PRN    Or    glucose-vitamin C (Dex-4) chewable tab 8 tablet  8 tablet Oral Q15 Min PRN    melatonin tab 3 mg  3 mg Oral Nightly PRN    ondansetron (Zofran) 4 MG/2ML injection 4 mg  4 mg Intravenous Q6H PRN    metoclopramide (Reglan) 5 mg/mL injection 10 mg  10 mg Intravenous Q8H PRN    enoxaparin (Lovenox) 40 MG/0.4ML SUBQ injection 40 mg  40 mg Subcutaneous Daily    acetaminophen (Tylenol Extra Strength) tab 500 mg  500 mg Oral Q4H PRN    polyethylene glycol (PEG 3350) (Miralax) 17 g oral packet 17 g  17 g Oral Daily PRN    sennosides (Senokot) tab 17.2 mg  17.2 mg Oral Nightly PRN    bisacodyl (Dulcolax) 10 MG rectal suppository 10 mg  10 mg Rectal Daily PRN    fleet enema (Fleet) 7-19 GM/118ML rectal enema 133 mL  1 enema Rectal Once PRN    insulin degludec 100 units/mL flextouch 8 Units  8 Units  Subcutaneous Daily    insulin degludec 100 units/mL flextouch 6 Units  6 Units Subcutaneous Nightly       Laboratory Data:  Lab Results   Component Value Date    WBC 5.6 05/19/2024    HGB 10.8 05/19/2024    HCT 32.9 05/19/2024    .0 05/19/2024     No results found for: \"INR\"  Lab Results   Component Value Date     05/19/2024    K 4.2 05/19/2024     05/19/2024    CO2 24.0 05/19/2024    BUN 32 05/19/2024    CREATSERUM 0.89 05/19/2024     05/19/2024    CA 8.7 05/19/2024       Telemetry: Stable.  No pauses.  ECHO:   Conclusions:  1. Left ventricle: The cavity size was normal. Wall thickness was normal.      Systolic function was normal. The estimated ejection fraction was 55-60%,      by visual assessment. Wall motion is normal; there are no regional wall      motion abnormalities. Doppler parameters are consistent with abnormal      left ventricular relaxation - grade 1 diastolic dysfunction.   2. Right ventricle: Systolic function was normal.   3. Left atrium: The left atrial volume was normal.   4. Pulmonary arteries: Systolic pressure was within the normal range,      estimated to be 26mm Hg.   5. Pericardium, extracardiac: There was no pericardial effusion.   Impressions:  No previous study from [facility] was   available for comparison.     Thank you for allowing our practice to participate in the care of your patient. Please do not hesitate to contact me if you have any questions.    Navid Frank MD

## 2024-05-19 NOTE — PLAN OF CARE
Received bedside report on this Pt at at 0800. Pt awake, A&Ox4, has hx of CVA, left facial droop noted and Pt has slurred speech at times.  Pt states she has dentured, but they were left at [facility] (confirmed with nurse from [facility] and Pt's daughter Blaire).  Pt is in SR on Tele monitor, sats greater than 92% on RA. Pt denied any pain or distress all shift.  PTA meds reviewed and completed after speaking with nurse from [facility] and reviewing records that Pt arrived with.  Notified Dr. Schaefer (at Pt's bedside) of Tresiba doses that Pt was receiving prior to arrival, new orders received and Insulin administered as ordered. Pt's daughter Blaire visited and later called, her questions answered. Plan for 2D Echo in AM. Paged and spoke with Dr. Frank notified of cardiology consult, he came and saw Pt.  POC discussed with Pt and her questions answered. Pt resting in bed, call light is in reach, bed alarm is on for safety.  Bedside report given to night nurse at this time.     Problem: Diabetes/Glucose Control  Goal: Glucose maintained within prescribed range  Description: INTERVENTIONS:  - Monitor Blood Glucose as ordered  - Assess for signs and symptoms of hyperglycemia and hypoglycemia  - Administer ordered medications to maintain glucose within target range  - Assess barriers to adequate nutritional intake and initiate nutrition consult as needed  - Instruct patient on self management of diabetes  Outcome: Progressing    Problem: CARDIOVASCULAR - ADULT  Goal: Maintains optimal cardiac output and hemodynamic stability  Description: INTERVENTIONS:  - Monitor vital signs, rhythm, and trends  - Monitor for bleeding, hypotension and signs of decreased cardiac output  - Evaluate effectiveness of vasoactive medications to optimize hemodynamic stability  - Monitor arterial and/or venous puncture sites for bleeding and/or hematoma  - Assess quality of pulses, skin color and temperature  - Assess for signs of  decreased coronary artery perfusion - ex. Angina  - Evaluate fluid balance, assess for edema, trend weights  Outcome: Progressing  Goal: Absence of cardiac arrhythmias or at baseline  Description: INTERVENTIONS:  - Continuous cardiac monitoring, monitor vital signs, obtain 12 lead EKG if indicated  - Evaluate effectiveness of antiarrhythmic and heart rate control medications as ordered  - Initiate emergency measures for life threatening arrhythmias  - Monitor electrolytes and administer replacement therapy as ordered  Outcome: Progressing     Problem: RESPIRATORY - ADULT  Goal: Achieves optimal ventilation and oxygenation  Description: INTERVENTIONS:  - Assess for changes in respiratory status  - Assess for changes in mentation and behavior  - Position to facilitate oxygenation and minimize respiratory effort  - Oxygen supplementation based on oxygen saturation or ABGs  - Provide Smoking Cessation handout, if applicable  - Encourage broncho-pulmonary hygiene including cough, deep breathe, Incentive Spirometry  - Assess the need for suctioning and perform as needed  - Assess and instruct to report SOB or any respiratory difficulty  - Respiratory Therapy support as indicated  - Manage/alleviate anxiety  - Monitor for signs/symptoms of CO2 retention  Outcome: Progressing

## 2024-05-19 NOTE — PLAN OF CARE
Assumed care of patient at 0700. Pt A/Ox 3-4, forgetful at times. 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 Dentures at [facility]. O2 sats maintained on room air. NSR on tele. Last BM 5/17. Purewick in place. Pt reports no pain. Pt up x1and walker. Pt updated on plan of care. Care needs met. Bed in lowest position, Call light within reach. Bed and chair alarm on. Called [facility] RN, she said pt is a Full Code there and they do not have a POLST for her.     POC: echo, possible discharge, PT/OT     Per KONRAD Rudd, pt is a full code.     1330: Pt blood sugar 497, rechecked showing 423. Per Dr. Schaefer, give 12 units of insulin coverage and recheck blood sugar around 4pm - for discharge clearance.     1630: Blood sugar 413. Per Dr. Schaefer, order BMP now and no discharge today. Recheck sugar at 1800.       Problem: Diabetes/Glucose Control  Goal: Glucose maintained within prescribed range  Description: INTERVENTIONS:  - Monitor Blood Glucose as ordered  - Assess for signs and symptoms of hyperglycemia and hypoglycemia  - Administer ordered medications to maintain glucose within target range  - Assess barriers to adequate nutritional intake and initiate nutrition consult as needed  - Instruct patient on self management of diabetes  Outcome: Progressing     Problem: Patient/Family Goals  Goal: Patient/Family Long Term Goal  Description: Patient's Long Term Goal: discharge to facility soon    Interventions:  - cardiology consult and follow up orders, labs, v/s, ,follow up appointments, , 2 d echo ,   -  cardiac monitor , safety  , pain mgt , discuss plan of care   Outcome: Progressing  Goal: Patient/Family Short Term Goal  Description: Patient's Short Term Goal: safety  , pain mgt , blood sugar control    Interventions:   - monitor v/s, tele, labs, and  safety check /assist , update plan of care ,monitor glucose and adjust  insulin  , pain mgt , safety , update plan of care , medication  regimen,   - See additional Care Plan goals for specific interventions  Outcome: Progressing     Problem: CARDIOVASCULAR - ADULT  Goal: Maintains optimal cardiac output and hemodynamic stability  Description: INTERVENTIONS:  - Monitor vital signs, rhythm, and trends  - Monitor for bleeding, hypotension and signs of decreased cardiac output  - Evaluate effectiveness of vasoactive medications to optimize hemodynamic stability  - Monitor arterial and/or venous puncture sites for bleeding and/or hematoma  - Assess quality of pulses, skin color and temperature  - Assess for signs of decreased coronary artery perfusion - ex. Angina  - Evaluate fluid balance, assess for edema, trend weights  Outcome: Progressing  Goal: Absence of cardiac arrhythmias or at baseline  Description: INTERVENTIONS:  - Continuous cardiac monitoring, monitor vital signs, obtain 12 lead EKG if indicated  - Evaluate effectiveness of antiarrhythmic and heart rate control medications as ordered  - Initiate emergency measures for life threatening arrhythmias  - Monitor electrolytes and administer replacement therapy as ordered  Outcome: Progressing     Problem: RESPIRATORY - ADULT  Goal: Achieves optimal ventilation and oxygenation  Description: INTERVENTIONS:  - Assess for changes in respiratory status  - Assess for changes in mentation and behavior  - Position to facilitate oxygenation and minimize respiratory effort  - Oxygen supplementation based on oxygen saturation or ABGs  - Provide Smoking Cessation handout, if applicable  - Encourage broncho-pulmonary hygiene including cough, deep breathe, Incentive Spirometry  - Assess the need for suctioning and perform as needed  - Assess and instruct to report SOB or any respiratory difficulty  - Respiratory Therapy support as indicated  - Manage/alleviate anxiety  - Monitor for signs/symptoms of CO2 retention  Outcome: Progressing     Problem: GASTROINTESTINAL - ADULT  Goal: Maintains or returns to  baseline bowel function  Description: INTERVENTIONS:  - Assess bowel function  - Maintain adequate hydration with IV or PO as ordered and tolerated  - Evaluate effectiveness of GI medications  - Encourage mobilization and activity  - Obtain nutritional consult as needed  - Establish a toileting routine/schedule  - Consider collaborating with pharmacy to review patient's medication profile  Outcome: Progressing  Goal: Maintains adequate nutritional intake (undernourished)  Description: INTERVENTIONS:  - Monitor percentage of each meal consumed  - Identify factors contributing to decreased intake, treat as appropriate  - Assist with meals as needed  - Monitor I&O, WT and lab values  - Obtain nutritional consult as needed  - Optimize oral hygiene and moisture  - Encourage food from home; allow for food preferences  - Enhance eating environment  Outcome: Progressing  Goal: Achieves appropriate nutritional intake (bariatric)  Description: INTERVENTIONS:  - Monitor for over-consumption  - Identify factors contributing to increased intake, treat as appropriate  - Monitor I&O, WT and lab values  - Obtain nutritional consult as needed  - Evaluate psychosocial factors contributing to over-consumption  Outcome: Progressing     Problem: GENITOURINARY - ADULT  Goal: Absence of urinary retention  Description: INTERVENTIONS:  - Assess patient’s ability to void and empty bladder  - Monitor intake/output and perform bladder scan as needed  - Follow urinary retention protocol/standard of care  - Consider collaborating with pharmacy to review patient's medication profile  - Implement strategies to promote bladder emptying  Outcome: Progressing     Problem: METABOLIC/FLUID AND ELECTROLYTES - ADULT  Goal: Electrolytes maintained within normal limits  Description: INTERVENTIONS:  - Monitor labs and rhythm and assess patient for signs and symptoms of electrolyte imbalances  - Administer electrolyte replacement as ordered  - Monitor  response to electrolyte replacements, including rhythm and repeat lab results as appropriate  - Fluid restriction as ordered  - Instruct patient on fluid and nutrition restrictions as appropriate  Outcome: Progressing     Problem: SAFETY ADULT - FALL  Goal: Free from fall injury  Description: INTERVENTIONS:  - Assess pt frequently for physical needs  - Identify cognitive and physical deficits and behaviors that affect risk of falls.  - [place] fall precautions as indicated by assessment.  - Educate pt/family on patient safety including physical limitations  - Instruct pt to call for assistance with activity based on assessment  - Modify environment to reduce risk of injury  - Provide assistive devices as appropriate  - Consider OT/PT consult to assist with strengthening/mobility  - Encourage toileting schedule  Outcome: Progressing     Problem: PAIN - ADULT  Goal: Verbalizes/displays adequate comfort level or patient's stated pain goal  Description: INTERVENTIONS:  - Encourage pt to monitor pain and request assistance  - Assess pain using appropriate pain scale  - Administer analgesics based on type and severity of pain and evaluate response  - Implement non-pharmacological measures as appropriate and evaluate response  - Consider cultural and social influences on pain and pain management  - Manage/alleviate anxiety  - Utilize distraction and/or relaxation techniques  - Monitor for opioid side effects  - Notify MD/LIP if interventions unsuccessful or patient reports new pain  - Anticipate increased pain with activity and pre-medicate as appropriate  Outcome: Progressing

## 2024-05-19 NOTE — PLAN OF CARE
Patient alert and oriented x 3. With period of confusion, On room air. Lung sounds clear , abdomen soft +bs, +pp , no edema, Hx of CVA ,slight facial droop ,sinus on cardiac monitor. Patient denies any chest pain or chest discomfort at this time. Patient voids, last BM 5/17. Skin care /in continence care provided and made comfortable, no acute distress noted, . 2D Echo in am ,Plan of care updated, all questions answered. Safety precautions in place. Bed alarm on. Will continue to monitor tele/labs/vital signs closely.     Plan:2D Echo , discharge plan     Problem: Diabetes/Glucose Control  Goal: Glucose maintained within prescribed range  Description: INTERVENTIONS:  - Monitor Blood Glucose as ordered  - Assess for signs and symptoms of hyperglycemia and hypoglycemia  - Administer ordered medications to maintain glucose within target range  - Assess barriers to adequate nutritional intake and initiate nutrition consult as needed  - Instruct patient on self management of diabetes  5/19/2024 0504 by Steffany Euceda RN  Outcome: Progressing  5/19/2024 0448 by Steffany Euceda RN  Outcome: Progressing     Problem: Patient/Family Goals  Goal: Patient/Family Long Term Goal  Description: Patient's Long Term Goal: discharge to facility soon    Interventions:  - cardiology consult and follow up orders, labs, v/s, ,follow up appointments, , 2 d echo ,   -  cardiac monitor , safety  , pain mgt , discuss plan of care   5/19/2024 0504 by Steffany Euceda RN  Outcome: Progressing  5/19/2024 0448 by Steffany Euceda RN  Outcome: Progressing  Goal: Patient/Family Short Term Goal  Description: Patient's Short Term Goal: safety  , pain mgt , blood sugar control    Interventions:   - monitor v/s, tele, labs, and  safety check /assist , update plan of care ,monitor glucose and adjust  insulin  , pain mgt , safety , update plan of care , medication regimen,   - See additional Care Plan goals for specific  interventions  5/19/2024 0504 by Steffany Euceda RN  Outcome: Progressing  5/19/2024 0448 by Steffany Euceda RN  Outcome: Progressing     Problem: CARDIOVASCULAR - ADULT  Goal: Maintains optimal cardiac output and hemodynamic stability  Description: INTERVENTIONS:  - Monitor vital signs, rhythm, and trends  - Monitor for bleeding, hypotension and signs of decreased cardiac output  - Evaluate effectiveness of vasoactive medications to optimize hemodynamic stability  - Monitor arterial and/or venous puncture sites for bleeding and/or hematoma  - Assess quality of pulses, skin color and temperature  - Assess for signs of decreased coronary artery perfusion - ex. Angina  - Evaluate fluid balance, assess for edema, trend weights  5/19/2024 0504 by Steffany Euceda RN  Outcome: Progressing  5/19/2024 0448 by Steffany Euceda RN  Outcome: Progressing  Goal: Absence of cardiac arrhythmias or at baseline  Description: INTERVENTIONS:  - Continuous cardiac monitoring, monitor vital signs, obtain 12 lead EKG if indicated  - Evaluate effectiveness of antiarrhythmic and heart rate control medications as ordered  - Initiate emergency measures for life threatening arrhythmias  - Monitor electrolytes and administer replacement therapy as ordered  5/19/2024 0504 by Steffany Euceda RN  Outcome: Progressing  5/19/2024 0448 by Steffany Euceda RN  Outcome: Progressing     Problem: RESPIRATORY - ADULT  Goal: Achieves optimal ventilation and oxygenation  Description: INTERVENTIONS:  - Assess for changes in respiratory status  - Assess for changes in mentation and behavior  - Position to facilitate oxygenation and minimize respiratory effort  - Oxygen supplementation based on oxygen saturation or ABGs  - Provide Smoking Cessation handout, if applicable  - Encourage broncho-pulmonary hygiene including cough, deep breathe, Incentive Spirometry  - Assess the need for suctioning and perform as needed  - Assess and instruct to  report SOB or any respiratory difficulty  - Respiratory Therapy support as indicated  - Manage/alleviate anxiety  - Monitor for signs/symptoms of CO2 retention  5/19/2024 0504 by Steffany Euceda RN  Outcome: Progressing  5/19/2024 0448 by Steffany Euceda RN  Outcome: Progressing     Problem: GASTROINTESTINAL - ADULT  Goal: Maintains or returns to baseline bowel function  Description: INTERVENTIONS:  - Assess bowel function  - Maintain adequate hydration with IV or PO as ordered and tolerated  - Evaluate effectiveness of GI medications  - Encourage mobilization and activity  - Obtain nutritional consult as needed  - Establish a toileting routine/schedule  - Consider collaborating with pharmacy to review patient's medication profile  5/19/2024 0504 by Steffany Euceda RN  Outcome: Progressing  5/19/2024 0448 by Steffany Euceda RN  Outcome: Progressing  Goal: Maintains adequate nutritional intake (undernourished)  Description: INTERVENTIONS:  - Monitor percentage of each meal consumed  - Identify factors contributing to decreased intake, treat as appropriate  - Assist with meals as needed  - Monitor I&O, WT and lab values  - Obtain nutritional consult as needed  - Optimize oral hygiene and moisture  - Encourage food from home; allow for food preferences  - Enhance eating environment  5/19/2024 0504 by Steffany Euceda RN  Outcome: Progressing  5/19/2024 0448 by Steffany Euceda RN  Outcome: Progressing  Goal: Achieves appropriate nutritional intake (bariatric)  Description: INTERVENTIONS:  - Monitor for over-consumption  - Identify factors contributing to increased intake, treat as appropriate  - Monitor I&O, WT and lab values  - Obtain nutritional consult as needed  - Evaluate psychosocial factors contributing to over-consumption  5/19/2024 0504 by Steffany Euceda RN  Outcome: Progressing  5/19/2024 0448 by Steffany Euceda RN  Outcome: Progressing     Problem: GENITOURINARY - ADULT  Goal: Absence  of urinary retention  Description: INTERVENTIONS:  - Assess patient’s ability to void and empty bladder  - Monitor intake/output and perform bladder scan as needed  - Follow urinary retention protocol/standard of care  - Consider collaborating with pharmacy to review patient's medication profile  - Implement strategies to promote bladder emptying  5/19/2024 0504 by Steffany Euceda RN  Outcome: Progressing  5/19/2024 0448 by Steffany Euceda RN  Outcome: Progressing     Problem: METABOLIC/FLUID AND ELECTROLYTES - ADULT  Goal: Electrolytes maintained within normal limits  Description: INTERVENTIONS:  - Monitor labs and rhythm and assess patient for signs and symptoms of electrolyte imbalances  - Administer electrolyte replacement as ordered  - Monitor response to electrolyte replacements, including rhythm and repeat lab results as appropriate  - Fluid restriction as ordered  - Instruct patient on fluid and nutrition restrictions as appropriate  5/19/2024 0504 by Steffany Euceda RN  Outcome: Progressing  5/19/2024 0448 by Steffany Euceda RN  Outcome: Progressing     Problem: SAFETY ADULT - FALL  Goal: Free from fall injury  Description: INTERVENTIONS:  - Assess pt frequently for physical needs  - Identify cognitive and physical deficits and behaviors that affect risk of falls.  - [place] fall precautions as indicated by assessment.  - Educate pt/family on patient safety including physical limitations  - Instruct pt to call for assistance with activity based on assessment  - Modify environment to reduce risk of injury  - Provide assistive devices as appropriate  - Consider OT/PT consult to assist with strengthening/mobility  - Encourage toileting schedule  5/19/2024 0504 by Steffany Euceda RN  Outcome: Progressing  5/19/2024 0448 by Steffany Euceda RN  Outcome: Progressing     Problem: PAIN - ADULT  Goal: Verbalizes/displays adequate comfort level or patient's stated pain goal  Description:  INTERVENTIONS:  - Encourage pt to monitor pain and request assistance  - Assess pain using appropriate pain scale  - Administer analgesics based on type and severity of pain and evaluate response  - Implement non-pharmacological measures as appropriate and evaluate response  - Consider cultural and social influences on pain and pain management  - Manage/alleviate anxiety  - Utilize distraction and/or relaxation techniques  - Monitor for opioid side effects  - Notify MD/LIP if interventions unsuccessful or patient reports new pain  - Anticipate increased pain with activity and pre-medicate as appropriate  5/19/2024 0504 by Steffany Euceda RN  Outcome: Progressing  5/19/2024 0448 by Steffany Euceda, RN  Outcome: Progressing

## 2024-05-19 NOTE — CM/SW NOTE
Informed patient cleared to return to [facility]--called facility --confirmed pat resides in assisted living.  When this writer spoke with nurse who cares for pt--directed needed to clear with DAYNE Guerra.  Called [facility] (cell )--cleared to return--nurse to share with nurse @ facility--daughter to transport patient back

## 2024-05-19 NOTE — CONSULTS
[facility]  Report of Consultation    Maite Cavazos Patient Status:  Observation    1950 MRN [id number]   [facility] 8NE-A Attending Cecilia Schaefer MD   Hosp Day # 0 PCP Macey Ramos MD     Reason for Consultation:   CP    History of Present Illness:   Maite Cavazos is a(n) 74 year old female with a history of DM, HTN, dyslipidemia and vascular dementia who presented with CP.  She notes it was \"sharp\" and localized to a small area on her mid chest.  It came on at random.  It lasted 10 minutes.  There was no significant radiation.  There was no exertional aspect of the pain.  It resolved spontaneously.  To my history there was no associated dyspnea.  She  has had no subsequent episodes.    She is presently in bed and comfortable.  She denies any more CP.  She is breathing comfortably and denies any palpitations or dizziness.  At baseline she can do mild exertion and notes no CP with that level of activity (walking about at home).      Past Medical History:   Past Medical History:    Anxiety state    Depression    Essential hypertension    High blood pressure    Type 1 diabetes mellitus (HCC)   CVA x 7  Sinus pause (8s) by event monitor, EP suggested no PPM    Social History:   Smoking:  None  Alcohol:  None    Family History:   No family history of premature arthrosclerotic heart disease     Medications:   Scheduled:    amLODIPine  2.5 mg Oral Daily    aspirin  81 mg Oral Daily    atorvastatin  40 mg Oral Nightly    fluticasone furoate-vilanterol  1 puff Inhalation Daily    cetirizine  10 mg Oral Daily    clopidogrel  75 mg Oral Daily    escitalopram  10 mg Oral Daily    losartan  50 mg Oral Daily    oxybutynin ER  5 mg Oral Daily    insulin aspart  1-20 Units Subcutaneous TID AC and HS    insulin aspart  1-68 Units Subcutaneous TID CC    enoxaparin  40 mg Subcutaneous Daily    insulin degludec  8 Units Subcutaneous Daily    insulin degludec  6 Units Subcutaneous  Nightly       Continuous Infusion:       PRN Medications:     albuterol    glucose **OR** glucose **OR** glucose-vitamin C **OR** dextrose **OR** glucose **OR** glucose **OR** glucose-vitamin C    melatonin    ondansetron    metoclopramide    acetaminophen    polyethylene glycol (PEG 3350)    sennosides    bisacodyl    fleet enema    Outpatient Medications:   Current Facility-Administered Medications on File Prior to Encounter   Medication Dose Route Frequency Provider Last Rate Last Admin    [COMPLETED] sodium chloride 0.9 % IV bolus 1,000 mL  1,000 mL Intravenous Once Joi Dolan MD   Stopped at 03/24/24 1221    [COMPLETED] insulin aspart (NovoLOG) 100 Units/mL vial 5 Units  0.1 Units/kg Subcutaneous Once Joi Dolan MD   5 Units at 03/24/24 0928    [COMPLETED] sulfamethoxazole-trimethoprim DS (Bactrim DS) 800-160 MG per tab 1 tablet  1 tablet Oral Once Joi Dolan MD   1 tablet at 03/24/24 1428     Current Outpatient Medications on File Prior to Encounter   Medication Sig Dispense Refill    insulin degludec 100 units/mL Subcutaneous Solution Pen-injector Inject 6 Units into the skin nightly.      Insulin Degludec (TRESIBA FLEXTOUCH) 100 UNIT/ML Subcutaneous Solution Pen-injector Inject 0.12 mL (12 Units total) into the skin daily. (Patient taking differently: Inject 8 Units into the skin daily.) 15 mL 3    insulin aspart (NOVOLOG FLEXPEN) 100 Units/mL Subcutaneous Solution Pen-injector Inject 3 times daily with meals in accordance to sliding scale. Max daily dose 27 units. 27 mL 0    amLODIPine 2.5 MG Oral Tab Take 1 tablet (2.5 mg total) by mouth daily.      Budesonide-Formoterol Fumarate 80-4.5 MCG/ACT Inhalation Aerosol Inhale 2 puffs into the lungs 2 (two) times daily.      clopidogrel 75 MG Oral Tab Take 1 tablet (75 mg total) by mouth daily.      escitalopram 10 MG Oral Tab Take 1 tablet (10 mg total) by mouth daily.      losartan 50 MG Oral Tab Take 1 tablet (50 mg total) by mouth daily.       montelukast 10 MG Oral Tab Take 1 tablet (10 mg total) by mouth daily.      oxybutynin ER 5 MG Oral Tablet 24 Hr Take 1 tablet (5 mg total) by mouth daily.      Acetaminophen ER (MAPAP ARTHRITIS PAIN) 650 MG Oral Tab CR Take 1 tablet (650 mg total) by mouth every 8 (eight) hours as needed for Pain.      Ca Phosphate-Cholecalciferol (CALTRATE GUMMY BITES OR) Take by mouth.      aspirin 81 MG Oral Tab EC Take 1 tablet (81 mg total) by mouth daily.      [] sulfamethoxazole-trimethoprim -160 MG Oral Tab per tablet Take 1 tablet by mouth 2 (two) times daily for 7 days. 14 tablet 0    Insulin Pen Needle (BD AUTOSHIELD DUO) 30G X 5 MM Does not apply Misc Pt injects insulin 5 times daily 500 each 1    insulin glargine (LANTUS SOLOSTAR) 100 UNIT/ML Subcutaneous Solution Pen-injector Inject 8 Units into the skin in the morning and 8 Units before bedtime. 15 mL 0    glucagon (GVOKE HYPOPEN 2-PACK) 1 MG/0.2ML Subcutaneous SUBQ injection Inject 0.2 mL (1 mg total) into the skin.      albuterol (2.5 MG/3ML) 0.083% Inhalation Nebu Soln Inhale 3 mL into the lungs every 6 (six) hours as needed.      atorvastatin 40 MG Oral Tab Take 1 tablet (40 mg total) by mouth nightly.      cetirizine 10 MG Oral Tab Take 1 tablet (10 mg total) by mouth daily.      insulin detemir 100 UNIT/ML Subcutaneous Solution Inject 5 Units into the skin 2 (two) times daily.      cholecalciferol 50 MCG (2000 UT) Oral Cap Take 1 capsule (2,000 Units total) by mouth daily. 1 tablet everyday      ergocalciferol 1.25 MG (94280 UT) Oral Cap Take 1 capsule (50,000 Units total) by mouth once a week.      glucose (GLUTOSE 15) 40% Oral Gel Take 37.5 mL (15 g total) by mouth once.      Loperamide HCl (ANTI-DIARRHEAL) 2 MG Oral Tab Take 1 tablet (2 mg total) by mouth daily.         Allergies:   Allergies   Allergen Reactions     Covid-19 Mrna Vacc (Moderna) OTHER (SEE COMMENTS)     DKA and stroke    DKA and stroke   DKA and stroke    Iodine (Topical) OTHER  (SEE COMMENTS)    Radiology Contrast Iodinated Dyes OTHER (SEE COMMENTS)       Review of Systems:   No fevers, chills, change in weight or bowel habits.  Ten point review of systems is otherwise negative or unremarkable.    Physical Exam:   Vitals:    05/19/24 1039   BP:    Pulse: 61   Resp:    Temp:      Wt Readings from Last 3 Encounters:   05/18/24 113 lb 15.7 oz (51.7 kg)   01/27/24 110 lb 3.7 oz (50 kg)   09/29/23 110 lb (49.9 kg)           General: Well developed, well nourished female.  Pt is in no acute distress.  HEENT:   Normocephalic.  Atraumatic.  Eyes with no scleral icterus.  Neck: Supple.  No JVD.  Carotids 2+ and equal in symmetric fashion.  No bruits are noted.  Cardiac: Regular rate and rhythm.   There is a normal S1 and S2.  No S3 or S4.  No murmurs, rubs, or gallops.  PMI is non-displaced with a normal apical impulse.  Lungs: Clear to ascultation bilaterally.  No focal rales, rhonchi, or wheezes.  Good air movement is noted throughout all lung fields.  Abdomen: Soft.  Non-distended.  Non-tender.  Bowel sounds are present and normoactive.  No guarding or rebound.   Extremities: Extremities do not demonstrate any evidence of peripheral edema.   No cyanosis or clubbing of the digits is appreciated.  Femoral, Dorsalis Pedis, and Posterior Tibialis  pulses are 2+ and equal in a symmetric fashion.  Neurologic: Alert and oriented, normal affect.  No gross deficit appreciated.  Integument:  No visible rashes are appreciated.      Laboratories and Data:   Labs:    Recent Labs   Lab 05/18/24  0210 05/19/24  0510   * 273*   BUN 27* 32*   CREATSERUM 0.72 0.89   CA 8.8 8.7   ALB 3.3*  --     138   K 4.1 4.2    109   CO2 30.0 24.0   ALKPHO 116  --    AST 17  --    ALT 23  --    BILT 0.3  --    TP 6.3*  --        Recent Labs   Lab 05/18/24  0210 05/19/24  0510   RBC 3.54* 3.57*   HGB 11.0* 10.8*   HCT 31.7* 32.9*   MCV 89.5 92.2   MCH 31.1 30.3   MCHC 34.7 32.8   RDW 12.3 12.1   NEPRELIM 2.80  3.13   WBC 5.6 5.6   .0 272.0     Troponin 7    No results for input(s): \"PTP\", \"INR\" in the last 168 hours.    No results for input(s): \"TROP\", \"CK\" in the last 168 hours.    Diagnostics:   Tele: NSR.  EKG: Sinus.  Low voltage R wave in V3.  This DOES NOT represent an anterior infarct.  The computer IS WRONG.  It is most likely LEAD PLACEMENT RELATED.  Essentially NORMAL EKG.         Assessment:  1. Atypical CP  2. DM  3. HTN  4. Dyslipidemia  5. Vascular dementia, CVA x7  6. Sinus pause of 8s (EP no PPM)      Plan:  1. CP: Atypical.  Echo to check wall motion.  If recurrent consider stress test but if echo acceptable and no further CP, can DC without stress.  2. DM: Per primary team.  3. Lipids: Statin.  4. HTN: ARB, CCB.  5. Pause: Tele monitoring.    Navid Frank MD       lives with family

## 2024-05-20 VITALS
WEIGHT: 117.75 LBS | OXYGEN SATURATION: 98 % | BODY MASS INDEX: 20 KG/M2 | TEMPERATURE: 98 F | DIASTOLIC BLOOD PRESSURE: 91 MMHG | SYSTOLIC BLOOD PRESSURE: 160 MMHG | HEART RATE: 70 BPM | RESPIRATION RATE: 17 BRPM

## 2024-05-20 LAB
GLUCOSE BLD-MCNC: 110 MG/DL (ref 70–99)
GLUCOSE BLD-MCNC: 329 MG/DL (ref 70–99)

## 2024-05-20 PROCEDURE — 99238 HOSP IP/OBS DSCHRG MGMT 30/<: CPT | Performed by: HOSPITALIST

## 2024-05-20 RX ORDER — FLUTICASONE FUROATE AND VILANTEROL TRIFENATATE 100; 25 UG/1; UG/1
1 POWDER RESPIRATORY (INHALATION) DAILY
COMMUNITY
Start: 2024-05-14

## 2024-05-20 RX ORDER — OXYBUTYNIN CHLORIDE 5 MG/1
5 TABLET ORAL DAILY
COMMUNITY

## 2024-05-20 NOTE — CM/SW NOTE
05/20/24 1000   Discharge disposition   Expected discharge disposition [facility]   Discharge transportation Private car       Per CM note from yesterday, pt approved to return to [facility]. RN to call report at discharge.    Report # [phone]

## 2024-05-20 NOTE — PLAN OF CARE
Patient alert and oriented x 3 with period confusion and forgetfulness  On room air. sinus on cardiac monitor. Patient denies any chest pain or chest discomfort at this time. Patient voids, last BM 5/17 , stool softener administered. Blood sugar getting florence, fasting blood sugar today 110,. Plan of care updated, all questions answered. Safety precautions in place. Bed alarm on. Will continue to monitor tele/labs/vital signs closely.     Plan:   Discharge plan today , monitor  glucose level  Problem: Diabetes/Glucose Control  Goal: Glucose maintained within prescribed range  Description: INTERVENTIONS:  - Monitor Blood Glucose as ordered  - Assess for signs and symptoms of hyperglycemia and hypoglycemia  - Administer ordered medications to maintain glucose within target range  - Assess barriers to adequate nutritional intake and initiate nutrition consult as needed  - Instruct patient on self management of diabetes  Outcome: Progressing     Problem: Patient/Family Goals  Goal: Patient/Family Long Term Goal  Description: Patient's Long Term Goal: discharge to facility soon    Interventions:  - cardiology consult and follow up orders, labs, v/s, ,follow up appointments, , 2 d echo ,   -  cardiac monitor , safety  , pain mgt , discuss plan of care   Outcome: Progressing  Goal: Patient/Family Short Term Goal  Description: Patient's Short Term Goal: safety  , pain mgt , blood sugar control    Interventions:   - monitor v/s, tele, labs, and  safety check /assist , update plan of care ,monitor glucose and adjust  insulin  , pain mgt , safety , update plan of care , medication regimen,   - See additional Care Plan goals for specific interventions  Outcome: Progressing     Problem: CARDIOVASCULAR - ADULT  Goal: Maintains optimal cardiac output and hemodynamic stability  Description: INTERVENTIONS:  - Monitor vital signs, rhythm, and trends  - Monitor for bleeding, hypotension and signs of decreased cardiac output  - Evaluate  effectiveness of vasoactive medications to optimize hemodynamic stability  - Monitor arterial and/or venous puncture sites for bleeding and/or hematoma  - Assess quality of pulses, skin color and temperature  - Assess for signs of decreased coronary artery perfusion - ex. Angina  - Evaluate fluid balance, assess for edema, trend weights  Outcome: Progressing  Goal: Absence of cardiac arrhythmias or at baseline  Description: INTERVENTIONS:  - Continuous cardiac monitoring, monitor vital signs, obtain 12 lead EKG if indicated  - Evaluate effectiveness of antiarrhythmic and heart rate control medications as ordered  - Initiate emergency measures for life threatening arrhythmias  - Monitor electrolytes and administer replacement therapy as ordered  Outcome: Progressing     Problem: RESPIRATORY - ADULT  Goal: Achieves optimal ventilation and oxygenation  Description: INTERVENTIONS:  - Assess for changes in respiratory status  - Assess for changes in mentation and behavior  - Position to facilitate oxygenation and minimize respiratory effort  - Oxygen supplementation based on oxygen saturation or ABGs  - Provide Smoking Cessation handout, if applicable  - Encourage broncho-pulmonary hygiene including cough, deep breathe, Incentive Spirometry  - Assess the need for suctioning and perform as needed  - Assess and instruct to report SOB or any respiratory difficulty  - Respiratory Therapy support as indicated  - Manage/alleviate anxiety  - Monitor for signs/symptoms of CO2 retention  Outcome: Progressing     Problem: GASTROINTESTINAL - ADULT  Goal: Maintains or returns to baseline bowel function  Description: INTERVENTIONS:  - Assess bowel function  - Maintain adequate hydration with IV or PO as ordered and tolerated  - Evaluate effectiveness of GI medications  - Encourage mobilization and activity  - Obtain nutritional consult as needed  - Establish a toileting routine/schedule  - Consider collaborating with pharmacy to  review patient's medication profile  Outcome: Progressing  Goal: Maintains adequate nutritional intake (undernourished)  Description: INTERVENTIONS:  - Monitor percentage of each meal consumed  - Identify factors contributing to decreased intake, treat as appropriate  - Assist with meals as needed  - Monitor I&O, WT and lab values  - Obtain nutritional consult as needed  - Optimize oral hygiene and moisture  - Encourage food from home; allow for food preferences  - Enhance eating environment  Outcome: Progressing  Goal: Achieves appropriate nutritional intake (bariatric)  Description: INTERVENTIONS:  - Monitor for over-consumption  - Identify factors contributing to increased intake, treat as appropriate  - Monitor I&O, WT and lab values  - Obtain nutritional consult as needed  - Evaluate psychosocial factors contributing to over-consumption  Outcome: Progressing     Problem: GENITOURINARY - ADULT  Goal: Absence of urinary retention  Description: INTERVENTIONS:  - Assess patient’s ability to void and empty bladder  - Monitor intake/output and perform bladder scan as needed  - Follow urinary retention protocol/standard of care  - Consider collaborating with pharmacy to review patient's medication profile  - Implement strategies to promote bladder emptying  Outcome: Progressing     Problem: METABOLIC/FLUID AND ELECTROLYTES - ADULT  Goal: Electrolytes maintained within normal limits  Description: INTERVENTIONS:  - Monitor labs and rhythm and assess patient for signs and symptoms of electrolyte imbalances  - Administer electrolyte replacement as ordered  - Monitor response to electrolyte replacements, including rhythm and repeat lab results as appropriate  - Fluid restriction as ordered  - Instruct patient on fluid and nutrition restrictions as appropriate  Outcome: Progressing     Problem: SAFETY ADULT - FALL  Goal: Free from fall injury  Description: INTERVENTIONS:  - Assess pt frequently for physical needs  -  Identify cognitive and physical deficits and behaviors that affect risk of falls.  - [place] fall precautions as indicated by assessment.  - Educate pt/family on patient safety including physical limitations  - Instruct pt to call for assistance with activity based on assessment  - Modify environment to reduce risk of injury  - Provide assistive devices as appropriate  - Consider OT/PT consult to assist with strengthening/mobility  - Encourage toileting schedule  Outcome: Progressing     Problem: PAIN - ADULT  Goal: Verbalizes/displays adequate comfort level or patient's stated pain goal  Description: INTERVENTIONS:  - Encourage pt to monitor pain and request assistance  - Assess pain using appropriate pain scale  - Administer analgesics based on type and severity of pain and evaluate response  - Implement non-pharmacological measures as appropriate and evaluate response  - Consider cultural and social influences on pain and pain management  - Manage/alleviate anxiety  - Utilize distraction and/or relaxation techniques  - Monitor for opioid side effects  - Notify MD/LIP if interventions unsuccessful or patient reports new pain  - Anticipate increased pain with activity and pre-medicate as appropriate  Outcome: Progressing

## 2024-05-20 NOTE — PHYSICAL THERAPY NOTE
PHYSICAL THERAPY TREATMENT NOTE - INPATIENT    Room Number: 8610/8610-A     Session: 1     Number of Visits to Meet Established Goals: 3    Presenting Problem: chest pain of uncertain etiology  Co-Morbidities : vascular dementia, stroke, DMT1, HTN, HLD    PHYSICAL THERAPY MEDICAL/SOCIAL HISTORY  History related to current admission: Patient is a 74 year old female admitted on 5/18/2024 from her Assisted Living facility for substernal chest pain without radiation.       HOME SITUATION  Type of Home: Assisted living facility ([facility])   Home Layout: One level     Lives With: Staff 24 hours (states she works with PT/OT; has assist for bed mobility and dressing; feeds self using her R hand)  Drives: No  Patient Owned Equipment: Rolling walker;Wheelchair (states she is able to ambulate at least 30 ft. with the RW; uses the w/c for longer distances)  Patient Regularly Uses: Glasses     Prior Level of [place]: Prior to admission, patient's baseline is she resides in Assisted Living and has assistance with bed mobility, transfers and ambulation when using a RW; uses a w/c for long distance ambulation.  States she has been working with PT/OT.     ASSESSMENT   Patient demonstrates fair progress this session, goals  remain in progress.    Patient continues to function below baseline with bed mobility, transfers, and gait.  Contributing factors to remaining limitations include decreased functional strength, impaired   balance, and decreased muscular endurance.  Next session anticipate patient to progress bed mobility, transfers, and gait.  Physical Therapy will continue to follow patient for duration of hospitalization.    Patient continues to benefit from continued skilled PT services: at discharge to promote functional independence and safety with additional support and return home with home health PT.    PLAN  PT Treatment Plan: Bed mobility;Endurance;Energy conservation;Patient education;Family education;Gait  training;Coordination;Range of motion;Strengthening;Transfer training;Balance training  Rehab Potential : Good  Frequency (Obs): 3x/week    CURRENT GOALS   Goal #1 Patient is able to demonstrate supine - sit EOB @ level: supervision      Goal #2 Patient is able to demonstrate transfers Sit to/from Stand at assistance level: supervision      Goal #3 Patient is able to ambulate 150 feet with assist device: walker - emerita at assistance level: supervision      Goal #4     Goal #5     Goal #6     Goal Comments: Goals established on 2024 all goals ongoing    SUBJECTIVE  Pt very pleasant and cooperative during session    OBJECTIVE  Precautions: Bed/chair alarm    WEIGHT BEARING RESTRICTION  Weight Bearing Restriction: None                PAIN ASSESSMENT   Ratin  Location: patient denies       BALANCE                                                                                                                       Static Sitting: Fair +  Dynamic Sitting: Fair -           Static Standing: Poor +  Dynamic Standing: Poor +    ACTIVITY TOLERANCE                         O2 WALK         AM-PAC '6-Clicks' INPATIENT SHORT FORM - BASIC MOBILITY  How much difficulty does the patient currently have...  Patient Difficulty: Turning over in bed (including adjusting bedclothes, sheets and blankets)?: A Little   Patient Difficulty: Sitting down on and standing up from a chair with arms (e.g., wheelchair, bedside commode, etc.): A Little   Patient Difficulty: Moving from lying on back to sitting on the side of the bed?: A Little   How much help from another person does the patient currently need...   Help from Another: Moving to and from a bed to a chair (including a wheelchair)?: A Little   Help from Another: Need to walk in hospital room?: A Little   Help from Another: Climbing 3-5 steps with a railing?: A Lot       AM-PAC Score:  Raw Score: 17   Approx Degree of Impairment: 50.57%   Standardized Score (AM-PAC  Scale): 42.13   CMS Modifier (G-Code): CK    FUNCTIONAL ABILITY STATUS  Gait Assessment   Functional Mobility/Gait Assessment  Gait Assistance: Minimum assistance  Distance (ft): 15  Assistive Device: Other (Comment) (hemiwalker)  Pattern: L Decreased stance time;L Foot drag    Skilled Therapy Provided: Per RN thom to work with pt. Pt received in chair and was agreeable to PT session.     Bed Mobility:  Rolling: NT   Supine<>Sit: NT   Sit<>Supine: NT     Transfer Mobility:  Sit<>Stand: min A   Stand<>Sit: min A   Gait: pt ambulated with hemiwalker on the R and min A, cues provided for sequencing and hemiwalker placement    Therapist's Comments: Pt educated on role of therapy, goals for session, safety, fall prevention, and activity recommendations.     Patient End of Session: Up in chair;Needs met;Call light within reach;RN aware of session/findings;All patient questions and concerns addressed;Alarm set    PT Session Time: 15 minutes  Gait Training: 15 minutes

## 2024-05-20 NOTE — PLAN OF CARE
Patient IV and tele removed, patient discharge instructions went over with family, patient and family verbalized understanding, patient wheeled down by staff to [facility], went home with patient's daughter.  With all belongings

## 2024-05-20 NOTE — PLAN OF CARE
Assumed care of patient from [facility] RN. Patient is A&O x3, disoriented to situation at times. No complaints of pain. Patient expresses wishing to \"go home\"    Patient is sinus rhythm on room air, denies chest pain denies shortness of breath, patient is incontinent at times of bladder pure wick in place up in chair, patient cleared for discharge. Awaiting for family to be available to pickup later this afternoon.       Problem: Diabetes/Glucose Control  Goal: Glucose maintained within prescribed range  Description: INTERVENTIONS:  - Monitor Blood Glucose as ordered  - Assess for signs and symptoms of hyperglycemia and hypoglycemia  - Administer ordered medications to maintain glucose within target range  - Assess barriers to adequate nutritional intake and initiate nutrition consult as needed  - Instruct patient on self management of diabetes  Outcome: Not Progressing     Problem: Patient/Family Goals  Goal: Patient/Family Long Term Goal  Description: Patient's Long Term Goal: discharge to facility soon    Interventions:  - cardiology consult and follow up orders, labs, v/s, ,follow up appointments, , 2 d echo ,   -  cardiac monitor , safety  , pain mgt , discuss plan of care   Outcome: Progressing     Problem: CARDIOVASCULAR - ADULT  Goal: Maintains optimal cardiac output and hemodynamic stability  Description: INTERVENTIONS:  - Monitor vital signs, rhythm, and trends  - Monitor for bleeding, hypotension and signs of decreased cardiac output  - Evaluate effectiveness of vasoactive medications to optimize hemodynamic stability  - Monitor arterial and/or venous puncture sites for bleeding and/or hematoma  - Assess quality of pulses, skin color and temperature  - Assess for signs of decreased coronary artery perfusion - ex. Angina  - Evaluate fluid balance, assess for edema, trend weights  Outcome: Progressing  Goal: Absence of cardiac arrhythmias or at baseline  Description: INTERVENTIONS:  - Continuous cardiac  monitoring, monitor vital signs, obtain 12 lead EKG if indicated  - Evaluate effectiveness of antiarrhythmic and heart rate control medications as ordered  - Initiate emergency measures for life threatening arrhythmias  - Monitor electrolytes and administer replacement therapy as ordered  Outcome: Progressing     Problem: RESPIRATORY - ADULT  Goal: Achieves optimal ventilation and oxygenation  Description: INTERVENTIONS:  - Assess for changes in respiratory status  - Assess for changes in mentation and behavior  - Position to facilitate oxygenation and minimize respiratory effort  - Oxygen supplementation based on oxygen saturation or ABGs  - Provide Smoking Cessation handout, if applicable  - Encourage broncho-pulmonary hygiene including cough, deep breathe, Incentive Spirometry  - Assess the need for suctioning and perform as needed  - Assess and instruct to report SOB or any respiratory difficulty  - Respiratory Therapy support as indicated  - Manage/alleviate anxiety  - Monitor for signs/symptoms of CO2 retention  Outcome: Progressing     Problem: GASTROINTESTINAL - ADULT  Goal: Maintains or returns to baseline bowel function  Description: INTERVENTIONS:  - Assess bowel function  - Maintain adequate hydration with IV or PO as ordered and tolerated  - Evaluate effectiveness of GI medications  - Encourage mobilization and activity  - Obtain nutritional consult as needed  - Establish a toileting routine/schedule  - Consider collaborating with pharmacy to review patient's medication profile  Outcome: Not Progressing  Goal: Maintains adequate nutritional intake (undernourished)  Description: INTERVENTIONS:  - Monitor percentage of each meal consumed  - Identify factors contributing to decreased intake, treat as appropriate  - Assist with meals as needed  - Monitor I&O, WT and lab values  - Obtain nutritional consult as needed  - Optimize oral hygiene and moisture  - Encourage food from home; allow for food  preferences  - Enhance eating environment  Outcome: Not Progressing  Goal: Achieves appropriate nutritional intake (bariatric)  Description: INTERVENTIONS:  - Monitor for over-consumption  - Identify factors contributing to increased intake, treat as appropriate  - Monitor I&O, WT and lab values  - Obtain nutritional consult as needed  - Evaluate psychosocial factors contributing to over-consumption  Outcome: Not Progressing     Problem: GENITOURINARY - ADULT  Goal: Absence of urinary retention  Description: INTERVENTIONS:  - Assess patient’s ability to void and empty bladder  - Monitor intake/output and perform bladder scan as needed  - Follow urinary retention protocol/standard of care  - Consider collaborating with pharmacy to review patient's medication profile  - Implement strategies to promote bladder emptying  Outcome: Not Progressing     Problem: METABOLIC/FLUID AND ELECTROLYTES - ADULT  Goal: Electrolytes maintained within normal limits  Description: INTERVENTIONS:  - Monitor labs and rhythm and assess patient for signs and symptoms of electrolyte imbalances  - Administer electrolyte replacement as ordered  - Monitor response to electrolyte replacements, including rhythm and repeat lab results as appropriate  - Fluid restriction as ordered  - Instruct patient on fluid and nutrition restrictions as appropriate  Outcome: Progressing     Problem: SAFETY ADULT - FALL  Goal: Free from fall injury  Description: INTERVENTIONS:  - Assess pt frequently for physical needs  - Identify cognitive and physical deficits and behaviors that affect risk of falls.  - [place] fall precautions as indicated by assessment.  - Educate pt/family on patient safety including physical limitations  - Instruct pt to call for assistance with activity based on assessment  - Modify environment to reduce risk of injury  - Provide assistive devices as appropriate  - Consider OT/PT consult to assist with strengthening/mobility  - Encourage  toileting schedule  Outcome: Progressing     Problem: PAIN - ADULT  Goal: Verbalizes/displays adequate comfort level or patient's stated pain goal  Description: INTERVENTIONS:  - Encourage pt to monitor pain and request assistance  - Assess pain using appropriate pain scale  - Administer analgesics based on type and severity of pain and evaluate response  - Implement non-pharmacological measures as appropriate and evaluate response  - Consider cultural and social influences on pain and pain management  - Manage/alleviate anxiety  - Utilize distraction and/or relaxation techniques  - Monitor for opioid side effects  - Notify MD/LIP if interventions unsuccessful or patient reports new pain  - Anticipate increased pain with activity and pre-medicate as appropriate  Outcome: Progressing

## 2024-05-22 NOTE — TELEPHONE ENCOUNTER
Amanda requesting refills for Novolog Flex Pens.  Please call.  Thank you.    Current Outpatient Medications   Medication Sig Dispense Refill    insulin aspart (NOVOLOG FLEXPEN) 100 Units/mL Subcutaneous Solution Pen-injector Inject 3 times daily with meals in accordance to sliding scale. Max daily dose 27 units. 27 mL 0

## 2024-05-22 NOTE — TELEPHONE ENCOUNTER
Endocrine refill protocol for basal insulins     Protocol Criteria:  - Appointment with Endocrinology completed in the last 6 months or scheduled in the next 3 months    - A1c result completed in the last 6 months and is <8.5%     Verify appointment has been completed or scheduled in the appropriate timeline. If so can send a 90 day supply with 1 refill per provider protocol.    Verify A1c has been completed within the last 6 months and is below 8.5%     Last completed office visit:1/8/2024   Next scheduled Follow up: No future appointments.   Last A1c result: Last A1c value was 9.5% done 3/14/2024.

## 2024-05-23 RX ORDER — INSULIN ASPART 100 [IU]/ML
INJECTION, SOLUTION INTRAVENOUS; SUBCUTANEOUS
Qty: 27 ML | Refills: 0 | Status: SHIPPED | OUTPATIENT
Start: 2024-05-23

## 2024-05-24 NOTE — DISCHARGE SUMMARY
[facility]     Maite Cavazos Patient Status:  Observation    1950 MRN [id number]   Location [facility] 8NE-A Attending No att. providers found   Hosp Day # 0 PCP Macey Ramos MD     Date of Admission: 2024  Date of Discharge:  2024     Discharge Disposition: Home or Self Care    Discharge Diagnosis:      History of Present Illness: 74 year old female with history of vascular dementia, type 1 diabetes, hypertension, hyperlipidemia depression presents emergency room with chest pain started last night substernal with no radiation.  Started before coming to the emergency room.  Patient did states she felt a little bit of shortness of breath when having the pain.  She denies any dizziness, lightheadedness, diaphoresis, nausea, vomiting.  Reported the patient was given aspirin and sublingual nitro spray prior to paramedics coming.     Brief Synopsis: Patient was admitted to cardiac telemetry floor for atypical chest pain and echocardiogram was ordered to check for wall motion abnormalities.  She also has history of diabetes hyperlipidemia hypertension.  Patient has history of vascular dementia and she had a stroke 7 years ago.  She had sinus pauses and she was evaluated by EP with no recommendations for pacemaker.  Patient remained stable troponin negative echo stable and she had no further chest pain.    Lace+ Score: 70  59-90 High Risk  29-58 Medium Risk  0-28   Low Risk       TCM Follow-Up Recommendation:  LACE > 58: High Risk of readmission after discharge from the hospital.      Procedures during hospitalization:       Incidental or significant findings and recommendations (brief descriptions):    Lab/Test results pending at Discharge:       Consultants:  cardiology    Discharge Medication List:     Discharge Medications        CONTINUE taking these medications        Instructions Prescription details   amLODIPine 2.5 MG Tabs  Commonly known as: Norvasc       Take 1 tablet (2.5 mg total) by mouth daily.   Refills: 0     aspirin 81 MG Tbec      Take 1 tablet (81 mg total) by mouth daily.   Refills: 0     BD AutoShield Duo 30G X 5 MM Misc  Generic drug: Insulin Pen Needle      Pt injects insulin 5 times daily   Quantity: 500 each  Refills: 1     Breo Ellipta 100-25 MCG/ACT Aepb  Generic drug: fluticasone furoate-vilanterol      Inhale 1 puff into the lungs daily.   Refills: 0     CALTRATE GUMMY BITES OR  Notes to patient: Resume home regimen       Take by mouth.   Refills: 0     cholecalciferol 50 MCG (2000 UT) Caps  Commonly known as: Vitamin D3      Take 1 capsule (2,000 Units total) by mouth daily. 1 tablet everyday   Refills: 0     clopidogrel 75 MG Tabs  Commonly known as: Plavix      Take 1 tablet (75 mg total) by mouth daily.   Refills: 0     escitalopram 10 MG Tabs  Commonly known as: Lexapro      Take 1 tablet (10 mg total) by mouth daily.   Refills: 0     Glutose 15 40% Gel  Generic drug: glucose      Take 37.5 mL (15 g total) by mouth once.   Refills: 0     Gvoke HypoPen 2-Pack 1 MG/0.2ML injection  Generic drug: glucagon      Inject 0.2 mL (1 mg total) into the skin.   Refills: 0     insulin degludec 100 units/mL Sopn      Inject 6 Units into the skin nightly.   Refills: 0     Tresiba FlexTouch 100 UNIT/ML Sopn  Generic drug: insulin degludec      Inject 8 Units into the skin daily for 10 days.   Stop taking on: May 29, 2024  Quantity: 0.8 mL  Refills: 0     losartan 50 MG Tabs  Commonly known as: Cozaar      Take 1 tablet (50 mg total) by mouth daily.   Refills: 0     oxybutynin 5 MG Tabs  Commonly known as: Ditropan      Take 1 tablet (5 mg total) by mouth daily.   Refills: 0            STOP taking these medications      albuterol (2.5 MG/3ML) 0.083% Nebu  Commonly known as: Ventolin        Anti-Diarrheal 2 MG Tabs  Generic drug: Loperamide HCl        Budesonide-Formoterol Fumarate 80-4.5 MCG/ACT Aero  Commonly known as: SYMBICORT        insulin detemir 100  UNIT/ML Soln  Commonly known as: Levemir        Lantus SoloStar 100 UNIT/ML Sopn  Generic drug: insulin glargine        Mapap Arthritis Pain 650 MG Tbcr  Generic drug: Acetaminophen ER        montelukast 10 MG Tabs  Commonly known as: Singulair        sulfamethoxazole-trimethoprim -160 MG Tabs per tablet  Commonly known as: Bactrim DS                  Where to Get Your Medications        Please  your prescriptions at the location directed by your doctor or nurse    Bring a paper prescription for each of these medications  Tresiba FlexTouch 100 UNIT/ML Sopn         [place] reviewed[place]     Follow-up appointment:   Macey Ramos MD  [address]  [facility] [address] [phone]    Schedule an appointment as soon as possible for a visit in 2 week(s)      Navid Frank MD  [address]  [facility] [address] [phone]    Call  As needed    Appointments for Next 30 Days 2024 - 2024      None            Vital signs:       Physical Exam:    General: No acute distress   Lungs: clear to auscultation  Cardiovascular: S1, S2  Abdomen: Soft      -----------------------------------------------------------------------------------------------  PATIENT DISCHARGE INSTRUCTIONS: See electronic chart    Cecilia Schaefer MD    Total time spent on discharge plannin minutes     The  [employer] C[employer] Act makes medical notes like these available to patients in the interest of transparency. Please be advised this is a medical document. Medical documents are intended to carry relevant information, facts as evident, and the clinical opinion of the practitioner. The medical note is intended as peer to peer communication and may appear blunt or direct. It is written in medical language and may contain abbreviations or verbiage that are unfamiliar. \

## 2024-05-30 ENCOUNTER — TELEPHONE (OUTPATIENT)
Dept: ENDOCRINOLOGY CLINIC | Facility: CLINIC | Age: 74
End: 2024-05-30

## 2024-05-31 NOTE — TELEPHONE ENCOUNTER
Dr Raza -- RN from [facility] in [place] is wondering if you still want patient to take Tresiba 8 units AM and 6 units PM? Just want to clarify.       Pt has been following the updated Novolog sliding scale:  Breakfast:   Glucose 120-150 5 units  151-199 5 units  200-249  7 units  250-300 8 units  300-400 10 units     Lunch:   120-150 2 units  151-199 3 units  200-249 5 units   250-299 6 units  300-349 7 units   350-400 8 units      Dinner:   120-150 2 units  151-199 3 units  200-249 5 units  250-299 6 units  300-349 8 units  350-450 10 units

## 2024-06-03 NOTE — TELEPHONE ENCOUNTER
Kelli PEREIRA calling from [facility] in [place] in regards to patient's Tresiba. Please call.   Direct Number [phone]

## 2024-06-03 NOTE — TELEPHONE ENCOUNTER
Dr. Raza,     Pt went to hospital 5/18   Per , pt was to discontinue Tresiba on 5/29.     Novolog CF    Fasting BLunch Before Dinner  5/27 225 197 210  5/28 260 187 83  5/29 183 106 310  5/30 348 384 227  5/31 257 269 350  6/1 174 318 181  6/2 206 331 191  6/3 353 328    Pt denies any s/s   Pt has been following the updated Novolog sliding scale:  Breakfast:   Glucose 120-150 5 units  151-199 5 units  200-249  7 units  250-300 8 units  300-400 10 units     Lunch:   120-150 2 units  151-199 3 units  200-249 5 units   250-299 6 units  300-349 7 units   350-400 8 units      Dinner:   120-150 2 units  151-199 3 units  200-249 5 units  250-299 6 units  300-349 8 units  350-450 10 units

## 2024-06-07 DIAGNOSIS — E10.65 UNCONTROLLED TYPE 1 DIABETES MELLITUS WITH HYPERGLYCEMIA (HCC): Primary | ICD-10-CM

## 2024-06-07 RX ORDER — INSULIN ASPART INJECTION 100 [IU]/ML
INJECTION, SOLUTION SUBCUTANEOUS
Qty: 27 ML | Refills: 0 | Status: SHIPPED | OUTPATIENT
Start: 2024-06-07

## 2024-06-07 NOTE — TELEPHONE ENCOUNTER
Dr Raza,    Per nurse Amanda Bg update:  One hour ago 154 manually and 139 with sensor.   191 now both manually and with Dorian sensor 202.       Insurance is no longer cover novolog. Per nurse, insurance requesting Fiasp brand name (aspart)     Rx pended      New order for Tresiba 8 units in the morning and 2 Units in the afternoon faxed to number [phone] as well as hypoglycemia protocol instructions.       Patient showing signs of altered mental status - on the call light every 5 minutes, emotional, crying. She thinks she has a UTI. Has standing order per PCP for UA, will collect now.        Facility's main number its [phone]    thanks

## 2024-06-07 NOTE — TELEPHONE ENCOUNTER
Late entry   Staff Cameron paged last night   Low Bg at 2 am   Corrected with juice, cookies and sandwich   Now Bg is 200    Cameron wants help with:   1 Any adjustments to regimen: states Bg have been very variable  2. Hypoglycemia protocol    I reviewed Mx of hypoglycemia and avoiding over correction   Discussed to re check Bg in 1 hour and then in two hours and page ifunder 80 or over 350    ENDO STAFF:   Please see Dr Raza's recommendations below    Thanks

## 2024-06-07 NOTE — TELEPHONE ENCOUNTER
Patient's RN paged overnight with hypoglycemic episode 54 overnight.  Please change Tresiba to 8 units subcutaneous QAM; 2 units subcutaneous QPM.  Please send the facility hypoglycemia instructions.  I think the nurses are overcorrecting low glucose levels.  Also please give instructions to double check low glucose levels with fingerstick.  Thank you.

## 2024-06-08 ENCOUNTER — TELEPHONE (OUTPATIENT)
Dept: ENDOCRINOLOGY CLINIC | Facility: CLINIC | Age: 74
End: 2024-06-08

## 2024-06-08 ENCOUNTER — LAB REQUISITION (OUTPATIENT)
Dept: LAB | Facility: HOSPITAL | Age: 74
End: 2024-06-08
Payer: MEDICARE

## 2024-06-08 DIAGNOSIS — Z00.00 ENCOUNTER FOR GENERAL ADULT MEDICAL EXAMINATION WITHOUT ABNORMAL FINDINGS: ICD-10-CM

## 2024-06-08 PROCEDURE — 87086 URINE CULTURE/COLONY COUNT: CPT

## 2024-06-08 PROCEDURE — 87088 URINE BACTERIA CULTURE: CPT

## 2024-06-08 PROCEDURE — 87186 SC STD MICRODIL/AGAR DIL: CPT

## 2024-06-10 DIAGNOSIS — E10.65 UNCONTROLLED TYPE 1 DIABETES MELLITUS WITH HYPERGLYCEMIA (HCC): ICD-10-CM

## 2024-06-11 RX ORDER — INSULIN ASPART INJECTION 100 [IU]/ML
INJECTION, SOLUTION SUBCUTANEOUS
Qty: 27 ML | Refills: 0 | Status: SHIPPED | OUTPATIENT
Start: 2024-06-11

## 2024-06-11 NOTE — TELEPHONE ENCOUNTER
Current Outpatient Medications   Medication Sig Dispense Refill    insulin aspart (NOVOLOG FLEXPEN) 100 Units/mL Subcutaneous Solution Pen-injector Inject 3 times daily with meals in accordance to sliding scale. Max daily dose 27 units. 27 mL 0     Pharmacy Message: Plan does not cover medication prescribed. Per RX benefit plan alternative medications include: FIASP. Please fax back with approval along with strength,directions, quantity and refills .

## 2024-06-11 NOTE — TELEPHONE ENCOUNTER
Endocrine refill protocol for basal insulins     Protocol Criteria:fail  - Appointment with Endocrinology completed in the last 6 months or scheduled in the next 3 months    - A1c result completed in the last 6 months and is <8.5%     Verify appointment has been completed or scheduled in the appropriate timeline. If so can send a 90 day supply with 1 refill per provider protocol.    Verify A1c has been completed within the last 6 months and is below 8.5%     Last completed office visit:1/8/2024   Next scheduled Follow up: no future appt     Last A1c result: Last A1c value was 9.5% done 3/14/2024.

## 2024-06-12 ENCOUNTER — TELEPHONE (OUTPATIENT)
Dept: ENDOCRINOLOGY CLINIC | Facility: CLINIC | Age: 74
End: 2024-06-12

## 2024-06-12 NOTE — TELEPHONE ENCOUNTER
RN with [facility] is calling in regards to Insulin Rx.  She was informed by pharmacy that the insulin prescription was changed to a new medication and it is on back order.  Rx will arrived at pharmacy in 2-3 days.  Patient only has 2-3 days worth of the original prescription of Insulin.  RN wants to confirm the change in medication.  Please call

## 2024-06-12 NOTE — TELEPHONE ENCOUNTER
Called [facility] at [phone]     Spoke to Ale PEREIRA who reported Fiasp is on back order but will approximately be delivered on Friday.    PA for novolog submitted via cover my meds indicating that Fiasp is on back order.      Medicare Part D :    Medicare BIN [id number]  [facility] MEDD[employer]  [employer] [place] [id number]  Group #[id number]      Ale PEREIRA requesting letter with Fiasp insulin order.    Letter sent to fax [phone].

## 2024-06-13 ENCOUNTER — TELEPHONE (OUTPATIENT)
Dept: ENDOCRINOLOGY CLINIC | Facility: CLINIC | Age: 74
End: 2024-06-13

## 2024-06-13 DIAGNOSIS — E10.65 UNCONTROLLED TYPE 1 DIABETES MELLITUS WITH HYPERGLYCEMIA (HCC): Primary | ICD-10-CM

## 2024-06-13 RX ORDER — INSULIN ASPART 100 [IU]/ML
INJECTION, SOLUTION INTRAVENOUS; SUBCUTANEOUS
Qty: 27 ML | Refills: 0 | Status: SHIPPED | OUTPATIENT
Start: 2024-06-13

## 2024-06-13 NOTE — TELEPHONE ENCOUNTER
Received fax from [employer] in regards to Novolog Flexpen. Medication has been approved from 06/12/2024 until further notice. [employer] message sent to pt and approval letter sent to scanning.    Approval # - [id number]

## 2024-06-13 NOTE — TELEPHONE ENCOUNTER
Novolog PA approved, called Sunil Aguilera RN.    Per Ale PEREIRA pharmacy did not received novolog Rx.    RN called pharmacy, novolog not received. Rx sent again with sliding scale.

## 2024-06-17 ENCOUNTER — TELEPHONE (OUTPATIENT)
Dept: ENDOCRINOLOGY CLINIC | Facility: CLINIC | Age: 74
End: 2024-06-17

## 2024-07-10 ENCOUNTER — LAB REQUISITION (OUTPATIENT)
Dept: LAB | Facility: HOSPITAL | Age: 74
End: 2024-07-10
Payer: MEDICARE

## 2024-07-10 DIAGNOSIS — Z01.89 ENCOUNTER FOR OTHER SPECIFIED SPECIAL EXAMINATIONS: ICD-10-CM

## 2024-07-10 LAB
BILIRUB UR QL: NEGATIVE
CLARITY UR: CLEAR
COLOR UR: YELLOW
GLUCOSE UR-MCNC: 500 MG/DL
HGB UR QL STRIP.AUTO: NEGATIVE
HYALINE CASTS #/AREA URNS AUTO: PRESENT /LPF
KETONES UR-MCNC: NEGATIVE MG/DL
LEUKOCYTE ESTERASE UR QL STRIP.AUTO: 75
NITRITE UR QL STRIP.AUTO: NEGATIVE
PH UR: 6.5 [PH] (ref 5–8)
SP GR UR STRIP: 1.02 (ref 1–1.03)
UROBILINOGEN UR STRIP-ACNC: NORMAL

## 2024-07-10 PROCEDURE — 81001 URINALYSIS AUTO W/SCOPE: CPT | Performed by: FAMILY MEDICINE

## 2024-07-10 PROCEDURE — 87077 CULTURE AEROBIC IDENTIFY: CPT | Performed by: FAMILY MEDICINE

## 2024-07-10 PROCEDURE — 87086 URINE CULTURE/COLONY COUNT: CPT | Performed by: FAMILY MEDICINE

## 2024-07-11 ENCOUNTER — LAB REQUISITION (OUTPATIENT)
Dept: LAB | Facility: HOSPITAL | Age: 74
End: 2024-07-11
Payer: MEDICARE

## 2024-07-11 DIAGNOSIS — I10 ESSENTIAL (PRIMARY) HYPERTENSION: ICD-10-CM

## 2024-07-11 LAB
ALBUMIN SERPL-MCNC: 3.9 G/DL (ref 3.4–5)
ALBUMIN/GLOB SERPL: 1.4 {RATIO} (ref 1–2)
ALP LIVER SERPL-CCNC: 94 U/L
ALT SERPL-CCNC: 28 U/L
ANION GAP SERPL CALC-SCNC: 10 MMOL/L (ref 0–18)
AST SERPL-CCNC: 17 U/L (ref 15–37)
BASOPHILS # BLD AUTO: 0.04 X10(3) UL (ref 0–0.2)
BASOPHILS NFR BLD AUTO: 0.7 %
BILIRUB SERPL-MCNC: 1.1 MG/DL (ref 0.1–2)
BUN BLD-MCNC: 22 MG/DL (ref 9–23)
CALCIUM BLD-MCNC: 9.1 MG/DL (ref 8.5–10.1)
CHLORIDE SERPL-SCNC: 102 MMOL/L (ref 98–112)
CHOLEST SERPL-MCNC: 136 MG/DL (ref ?–200)
CO2 SERPL-SCNC: 23 MMOL/L (ref 21–32)
CREAT BLD-MCNC: 0.85 MG/DL
EGFRCR SERPLBLD CKD-EPI 2021: 72 ML/MIN/1.73M2 (ref 60–?)
EOSINOPHIL # BLD AUTO: 0.1 X10(3) UL (ref 0–0.7)
EOSINOPHIL NFR BLD AUTO: 1.8 %
ERYTHROCYTE [DISTWIDTH] IN BLOOD BY AUTOMATED COUNT: 12.3 %
EST. AVERAGE GLUCOSE BLD GHB EST-MCNC: 237 MG/DL (ref 68–126)
FASTING PATIENT LIPID ANSWER: YES
FASTING STATUS PATIENT QL REPORTED: YES
GLOBULIN PLAS-MCNC: 2.8 G/DL (ref 2.8–4.4)
GLUCOSE BLD-MCNC: 363 MG/DL (ref 70–99)
HBA1C MFR BLD: 9.9 % (ref ?–5.7)
HCT VFR BLD AUTO: 35.5 %
HDLC SERPL-MCNC: 77 MG/DL (ref 40–59)
HGB BLD-MCNC: 12 G/DL
IMM GRANULOCYTES # BLD AUTO: 0.01 X10(3) UL (ref 0–1)
IMM GRANULOCYTES NFR BLD: 0.2 %
LDLC SERPL CALC-MCNC: 48 MG/DL (ref ?–100)
LYMPHOCYTES # BLD AUTO: 1.91 X10(3) UL (ref 1–4)
LYMPHOCYTES NFR BLD AUTO: 33.9 %
MCH RBC QN AUTO: 29.9 PG (ref 26–34)
MCHC RBC AUTO-ENTMCNC: 33.8 G/DL (ref 31–37)
MCV RBC AUTO: 88.3 FL
MONOCYTES # BLD AUTO: 0.35 X10(3) UL (ref 0.1–1)
MONOCYTES NFR BLD AUTO: 6.2 %
NEUTROPHILS # BLD AUTO: 3.22 X10 (3) UL (ref 1.5–7.7)
NEUTROPHILS # BLD AUTO: 3.22 X10(3) UL (ref 1.5–7.7)
NEUTROPHILS NFR BLD AUTO: 57.2 %
NONHDLC SERPL-MCNC: 59 MG/DL (ref ?–130)
OSMOLALITY SERPL CALC.SUM OF ELEC: 298 MOSM/KG (ref 275–295)
PLATELET # BLD AUTO: 253 10(3)UL (ref 150–450)
POTASSIUM SERPL-SCNC: 4.3 MMOL/L (ref 3.5–5.1)
PROT SERPL-MCNC: 6.7 G/DL (ref 6.4–8.2)
RBC # BLD AUTO: 4.02 X10(6)UL
SODIUM SERPL-SCNC: 135 MMOL/L (ref 136–145)
TRIGL SERPL-MCNC: 50 MG/DL (ref 30–149)
TSI SER-ACNC: 1.2 MIU/ML (ref 0.36–3.74)
VLDLC SERPL CALC-MCNC: 7 MG/DL (ref 0–30)
WBC # BLD AUTO: 5.6 X10(3) UL (ref 4–11)

## 2024-07-11 PROCEDURE — 80053 COMPREHEN METABOLIC PANEL: CPT

## 2024-07-11 PROCEDURE — 83036 HEMOGLOBIN GLYCOSYLATED A1C: CPT

## 2024-07-11 PROCEDURE — 84443 ASSAY THYROID STIM HORMONE: CPT

## 2024-07-11 PROCEDURE — 85025 COMPLETE CBC W/AUTO DIFF WBC: CPT

## 2024-07-11 PROCEDURE — 80061 LIPID PANEL: CPT

## 2024-07-31 PROCEDURE — 81003 URINALYSIS AUTO W/O SCOPE: CPT | Performed by: FAMILY MEDICINE

## 2024-07-31 PROCEDURE — 87086 URINE CULTURE/COLONY COUNT: CPT | Performed by: FAMILY MEDICINE

## 2024-08-01 ENCOUNTER — LAB REQUISITION (OUTPATIENT)
Dept: LAB | Facility: HOSPITAL | Age: 74
End: 2024-08-01
Payer: MEDICARE

## 2024-08-01 DIAGNOSIS — F33.9 MAJOR DEPRESSIVE DISORDER, RECURRENT, UNSPECIFIED (HCC): ICD-10-CM

## 2024-08-01 DIAGNOSIS — E10.10 TYPE 1 DIABETES MELLITUS WITH KETOACIDOSIS WITHOUT COMA (HCC): ICD-10-CM

## 2024-08-01 DIAGNOSIS — B08.8 OTHER SPECIFIED VIRAL INFECTIONS CHARACTERIZED BY SKIN AND MUCOUS MEMBRANE LESIONS: ICD-10-CM

## 2024-08-01 DIAGNOSIS — I10 ESSENTIAL (PRIMARY) HYPERTENSION: ICD-10-CM

## 2024-08-01 LAB
BILIRUB UR QL STRIP.AUTO: NEGATIVE
CLARITY UR REFRACT.AUTO: CLEAR
GLUCOSE UR STRIP.AUTO-MCNC: 300 MG/DL
KETONES UR STRIP.AUTO-MCNC: NEGATIVE MG/DL
LEUKOCYTE ESTERASE UR QL STRIP.AUTO: NEGATIVE
NITRITE UR QL STRIP.AUTO: NEGATIVE
PH UR STRIP.AUTO: 6 [PH] (ref 5–8)
PROT UR STRIP.AUTO-MCNC: NEGATIVE MG/DL
RBC UR QL AUTO: NEGATIVE
SP GR UR STRIP.AUTO: 1.02 (ref 1–1.03)
UROBILINOGEN UR STRIP.AUTO-MCNC: NORMAL MG/DL

## 2024-08-16 ENCOUNTER — APPOINTMENT (OUTPATIENT)
Dept: GENERAL RADIOLOGY | Facility: HOSPITAL | Age: 74
End: 2024-08-16
Attending: EMERGENCY MEDICINE
Payer: MEDICARE

## 2024-08-16 ENCOUNTER — APPOINTMENT (OUTPATIENT)
Dept: CT IMAGING | Facility: HOSPITAL | Age: 74
End: 2024-08-16
Attending: EMERGENCY MEDICINE
Payer: MEDICARE

## 2024-08-16 ENCOUNTER — HOSPITAL ENCOUNTER (EMERGENCY)
Facility: HOSPITAL | Age: 74
Discharge: NURSING, REHAB OR LONG-TERM CARE | End: 2024-08-16
Attending: EMERGENCY MEDICINE
Payer: MEDICARE

## 2024-08-16 ENCOUNTER — HOSPITAL ENCOUNTER (EMERGENCY)
Facility: HOSPITAL | Age: 74
Discharge: HOME | End: 2024-08-16
Attending: EMERGENCY MEDICINE
Payer: MEDICARE

## 2024-08-16 VITALS
HEART RATE: 97 BPM | SYSTOLIC BLOOD PRESSURE: 179 MMHG | TEMPERATURE: 98 F | RESPIRATION RATE: 25 BRPM | OXYGEN SATURATION: 100 % | DIASTOLIC BLOOD PRESSURE: 85 MMHG

## 2024-08-16 DIAGNOSIS — E10.65 TYPE 1 DIABETES MELLITUS WITH HYPERGLYCEMIA (HCC): Primary | ICD-10-CM

## 2024-08-16 LAB
ALBUMIN SERPL-MCNC: 4.2 G/DL (ref 3.2–4.8)
ALBUMIN/GLOB SERPL: 1.8 {RATIO} (ref 1–2)
ALP LIVER SERPL-CCNC: 98 U/L
ALT SERPL-CCNC: 14 U/L
ANION GAP SERPL CALC-SCNC: 4 MMOL/L (ref 0–18)
AST SERPL-CCNC: 15 U/L (ref ?–34)
BASOPHILS # BLD AUTO: 0.04 X10(3) UL (ref 0–0.2)
BASOPHILS NFR BLD AUTO: 0.7 %
BILIRUB SERPL-MCNC: 0.5 MG/DL (ref 0.2–1.1)
BILIRUB UR QL STRIP.AUTO: NEGATIVE
BUN BLD-MCNC: 18 MG/DL (ref 9–23)
CALCIUM BLD-MCNC: 9.6 MG/DL (ref 8.7–10.4)
CHLORIDE SERPL-SCNC: 106 MMOL/L (ref 98–112)
CLARITY UR REFRACT.AUTO: CLEAR
CO2 SERPL-SCNC: 27 MMOL/L (ref 21–32)
COLOR UR AUTO: YELLOW
CREAT BLD-MCNC: 0.93 MG/DL
EGFRCR SERPLBLD CKD-EPI 2021: 64 ML/MIN/1.73M2 (ref 60–?)
EOSINOPHIL # BLD AUTO: 0.16 X10(3) UL (ref 0–0.7)
EOSINOPHIL NFR BLD AUTO: 2.8 %
ERYTHROCYTE [DISTWIDTH] IN BLOOD BY AUTOMATED COUNT: 13.1 %
GLOBULIN PLAS-MCNC: 2.4 G/DL (ref 2–3.5)
GLUCOSE BLD-MCNC: 339 MG/DL (ref 70–99)
GLUCOSE BLD-MCNC: 356 MG/DL (ref 70–99)
GLUCOSE UR STRIP.AUTO-MCNC: 500 MG/DL
HCT VFR BLD AUTO: 34.8 %
HGB BLD-MCNC: 12 G/DL
IMM GRANULOCYTES # BLD AUTO: 0.01 X10(3) UL (ref 0–1)
IMM GRANULOCYTES NFR BLD: 0.2 %
KETONES UR STRIP.AUTO-MCNC: NEGATIVE MG/DL
LEUKOCYTE ESTERASE UR QL STRIP.AUTO: NEGATIVE
LYMPHOCYTES # BLD AUTO: 1.49 X10(3) UL (ref 1–4)
LYMPHOCYTES NFR BLD AUTO: 26.4 %
MCH RBC QN AUTO: 30.8 PG (ref 26–34)
MCHC RBC AUTO-ENTMCNC: 34.5 G/DL (ref 31–37)
MCV RBC AUTO: 89.2 FL
MONOCYTES # BLD AUTO: 0.46 X10(3) UL (ref 0.1–1)
MONOCYTES NFR BLD AUTO: 8.2 %
NEUTROPHILS # BLD AUTO: 3.48 X10 (3) UL (ref 1.5–7.7)
NEUTROPHILS # BLD AUTO: 3.48 X10(3) UL (ref 1.5–7.7)
NEUTROPHILS NFR BLD AUTO: 61.7 %
NITRITE UR QL STRIP.AUTO: NEGATIVE
OSMOLALITY SERPL CALC.SUM OF ELEC: 299 MOSM/KG (ref 275–295)
PH UR STRIP.AUTO: 7 [PH] (ref 5–8)
PLATELET # BLD AUTO: 247 10(3)UL (ref 150–450)
POTASSIUM SERPL-SCNC: 4.3 MMOL/L (ref 3.5–5.1)
PROT SERPL-MCNC: 6.6 G/DL (ref 5.7–8.2)
PROT UR STRIP.AUTO-MCNC: NEGATIVE MG/DL
RBC # BLD AUTO: 3.9 X10(6)UL
RBC UR QL AUTO: NEGATIVE
SARS-COV-2 RNA RESP QL NAA+PROBE: NOT DETECTED
SODIUM SERPL-SCNC: 137 MMOL/L (ref 136–145)
SP GR UR STRIP.AUTO: 1.02 (ref 1–1.03)
UROBILINOGEN UR STRIP.AUTO-MCNC: 0.2 MG/DL
WBC # BLD AUTO: 5.6 X10(3) UL (ref 4–11)

## 2024-08-16 PROCEDURE — 70450 CT HEAD/BRAIN W/O DYE: CPT | Performed by: EMERGENCY MEDICINE

## 2024-08-16 PROCEDURE — 99285 EMERGENCY DEPT VISIT HI MDM: CPT

## 2024-08-16 PROCEDURE — 71045 X-RAY EXAM CHEST 1 VIEW: CPT | Performed by: EMERGENCY MEDICINE

## 2024-08-16 PROCEDURE — 80053 COMPREHEN METABOLIC PANEL: CPT | Performed by: EMERGENCY MEDICINE

## 2024-08-16 PROCEDURE — 81003 URINALYSIS AUTO W/O SCOPE: CPT | Performed by: EMERGENCY MEDICINE

## 2024-08-16 PROCEDURE — 96360 HYDRATION IV INFUSION INIT: CPT

## 2024-08-16 PROCEDURE — 85025 COMPLETE CBC W/AUTO DIFF WBC: CPT | Performed by: EMERGENCY MEDICINE

## 2024-08-16 PROCEDURE — 82962 GLUCOSE BLOOD TEST: CPT

## 2024-08-16 PROCEDURE — 96361 HYDRATE IV INFUSION ADD-ON: CPT

## 2024-08-16 RX ORDER — NITROFURANTOIN 25; 75 MG/1; MG/1
100 CAPSULE ORAL 2 TIMES DAILY
Qty: 14 CAPSULE | Refills: 0 | Status: SHIPPED | OUTPATIENT
Start: 2024-08-16 | End: 2024-08-16

## 2024-08-16 RX ORDER — INSULIN ASPART 100 [IU]/ML
0.15 INJECTION, SOLUTION INTRAVENOUS; SUBCUTANEOUS ONCE
Status: COMPLETED | OUTPATIENT
Start: 2024-08-16 | End: 2024-08-16

## 2024-08-17 ENCOUNTER — LAB REQUISITION (OUTPATIENT)
Dept: LAB | Facility: HOSPITAL | Age: 74
End: 2024-08-17
Payer: MEDICARE

## 2024-08-17 DIAGNOSIS — Z00.00 ENCOUNTER FOR GENERAL ADULT MEDICAL EXAMINATION WITHOUT ABNORMAL FINDINGS: ICD-10-CM

## 2024-08-17 LAB
BILIRUB UR QL STRIP.AUTO: NEGATIVE
CLARITY UR REFRACT.AUTO: CLEAR
GLUCOSE UR STRIP.AUTO-MCNC: >1000 MG/DL
KETONES UR STRIP.AUTO-MCNC: NEGATIVE MG/DL
LEUKOCYTE ESTERASE UR QL STRIP.AUTO: NEGATIVE
NITRITE UR QL STRIP.AUTO: NEGATIVE
PH UR STRIP.AUTO: 7 [PH] (ref 5–8)
PROT UR STRIP.AUTO-MCNC: NEGATIVE MG/DL
RBC #/AREA URNS AUTO: >10 /HPF
RBC #/AREA URNS AUTO: >10 /HPF
SP GR UR STRIP.AUTO: 1.02 (ref 1–1.03)
UROBILINOGEN UR STRIP.AUTO-MCNC: NORMAL MG/DL

## 2024-08-17 PROCEDURE — 81001 URINALYSIS AUTO W/SCOPE: CPT | Performed by: FAMILY MEDICINE

## 2024-08-17 PROCEDURE — 87086 URINE CULTURE/COLONY COUNT: CPT | Performed by: FAMILY MEDICINE

## 2024-08-17 PROCEDURE — 81015 MICROSCOPIC EXAM OF URINE: CPT | Performed by: FAMILY MEDICINE

## 2024-08-17 NOTE — ED INITIAL ASSESSMENT (HPI)
Patient to ED via EMS from AL facility with increased confusion. Patient denies any other complaints

## 2024-08-17 NOTE — ED PROVIDER NOTES
Patient Seen in: [facility] Emergency Department      History     Chief Complaint   Patient presents with    Altered Mental Status     Stated Complaint: AMS    Subjective:   HPI    74-year-old female presents for evaluation of increasing confusion from her assisted living facility [facility].  Patient typically is alert and oriented x 4, now somewhat confused and does not really know why she is here.  There is a vague report that she is being treated for UTI.  We will have to confirm this with the facility.  Patient denies any pain.  Denies chest pain or shortness of breath.  Denies cough or cold.  Denies nausea or vomiting.    Objective:   No pertinent past medical history.            No pertinent past surgical history.              No pertinent social history.            Review of Systems    Positive for stated Chief Complaint: Altered Mental Status    Other systems are as noted in HPI.  Constitutional and vital signs reviewed.      All other systems reviewed and negative except as noted above.    Physical Exam     ED Triage Vitals [08/16/24 2002]   /69   Pulse 74   Resp 15   Temp 97.7 °F (36.5 °C)   Temp src Oral   SpO2 97 %   O2 Device None (Room air)       Current Vitals:   Vital Signs  BP: 142/66  Pulse: 73  Resp: 18  Temp: 97.7 °F (36.5 °C)  Temp src: Oral  MAP (mmHg): 89    Oxygen Therapy  SpO2: 97 %  O2 Device: None (Room air)            Physical Exam    General: Alert, confused with certain questions, no apparent distress  HEENT:  Pupils equal reactive.  Extraocular motions intact. MMM.  Neck: Supple  Lungs: Clear to auscultation bilaterally.  Heart: Regular rate and rhythm.  Abdomen: Soft, nontender.   Skin: No rash.  No edema.  Neurologic: No focal neurologic deficits.  Normal speech pattern  Musculoskeletal: No tenderness or deformity noted.  Full range of motion throughout.        ED Course     Labs Reviewed   COMP METABOLIC PANEL (14) - Abnormal; Notable for the following components:        Result Value    Glucose 339 (*)     Calculated Osmolality 299 (*)     All other components within normal limits   CBC WITH DIFFERENTIAL WITH PLATELET - Abnormal; Notable for the following components:    HCT 34.8 (*)     All other components within normal limits   POCT GLUCOSE - Abnormal; Notable for the following components:    POC Glucose 356 (*)     All other components within normal limits   RAPID SARS-COV-2 BY PCR - Normal   URINALYSIS WITH CULTURE REFLEX                      MDM      Differential diagnosis includes COVID, UTI, pneumonia, metabolic disturbance      Chemistry with normal electrolytes and kidney function.  Glucose is 339.  No acidosis.  Subcu insulin given.    CBC is normal    COVID-negative    I have independently visualized the radiology images, my focus/limited interpretation: No pneumonia/consolidation    Defer to radiologist for other/incidental findings           CT BRAIN OR HEAD (CPT=70450)    Result Date: 8/16/2024  PROCEDURE:  CT BRAIN OR HEAD (08107)  COMPARISON:  [facility] CT, CT BRAIN OR HEAD (24407), 1/27/2024, 10:16 PM.  INDICATIONS:  AMS  TECHNIQUE:  Noncontrast CT scanning is performed through the brain. Dose reduction techniques were used. Dose information is transmitted to the ACR (American College of Radiology) NRDR (National Radiology Data Registry) which includes the Dose Index Registry.  PATIENT STATED HISTORY: (As transcribed by Technologist)  AMS    FINDINGS:  There is cerebral atrophy with symmetric prominence of the ventricles. There are patchy areas of low attenuation in the periventricular and deep white matter which are nonspecific but most consistent with small vessel ischemic changes. There is no evidence of hemorrhage, mass, midline shift, or extra-axial fluid collection.  Encephalomalacia involving the right frontal temporal region, similar to prior likely prior remote infarct.  The visualized paranasal sinuses show no significant findings. No evidence of  depressed skull fracture.            CONCLUSION: 1. No acute intracranial findings 2. Cerebral atrophy with chronic microvascular ischemic changes.  Remote right frontal temporal infarct.    LOCATION:  [facility]   Dictated by (CST): Eddie Paulson MD on 8/16/2024 at 9:24 PM     Finalized by (CST): Eddie Paulson MD on 8/16/2024 at 9:26 PM             Medications   sodium chloride 0.9 % IV bolus 1,000 mL (1,000 mL Intravenous New Bag 8/16/24 2018)   insulin aspart (NovoLOG) 100 Units/mL vial 8 Units (8 Units Subcutaneous Given 8/16/24 2100)     UA pending.         Medical Decision Making  Amount and/or Complexity of Data Reviewed  Independent Historian: caregiver and EMS     Details: Discussed with Cesar rueda RN.  Patient is not currently being treated for a UTI but there are concerns that she has a UTI        Disposition and Plan     Clinical Impression:  1. Type 1 diabetes mellitus with hyperglycemia (HCC)    2. Bladder infection         Disposition:  There is no disposition on file for this visit.  There is no disposition time on file for this visit.    Follow-up:  Macey Ramos MD  [address]   [address] [id number] [phone]    Call in 2 day(s)            Medications Prescribed:  Current Discharge Medication List        START taking these medications    Details   nitrofurantoin monohydrate macro 100 MG Oral Cap Take 1 capsule (100 mg total) by mouth 2 (two) times daily for 7 days.  Qty: 14 capsule, Refills: 0

## 2024-08-28 ENCOUNTER — TELEPHONE (OUTPATIENT)
Dept: ENDOCRINOLOGY CLINIC | Facility: CLINIC | Age: 74
End: 2024-08-28

## 2024-08-28 DIAGNOSIS — E10.65 UNCONTROLLED TYPE 1 DIABETES MELLITUS WITH HYPERGLYCEMIA (HCC): ICD-10-CM

## 2024-08-30 RX ORDER — INSULIN ASPART 100 [IU]/ML
INJECTION, SOLUTION INTRAVENOUS; SUBCUTANEOUS
Qty: 27 ML | Refills: 0 | Status: SHIPPED | OUTPATIENT
Start: 2024-08-30

## 2024-08-30 RX ORDER — INSULIN DEGLUDEC 100 U/ML
INJECTION, SOLUTION SUBCUTANEOUS
Qty: 12 ML | Refills: 1 | Status: SHIPPED | OUTPATIENT
Start: 2024-08-30

## 2024-08-30 NOTE — TELEPHONE ENCOUNTER
Dr Raza-    Alicia from [facility] called.    States that pt was previously taking 8U of Tresiba in the morning and 2U in the evening- do you still want tresiba 12U daily? If so, additional letter will need to be faxed to dc evening dose    Per Alicia- Dr Pérez gave order on 8/23 to give 10U novolog if BG >350- do you still want this order? There is no record in chart of this order.    Alicia also states that pt will be switching to Fiasp after supply of Novolog is used up, that is what is covered by insurance.

## 2024-08-30 NOTE — TELEPHONE ENCOUNTER
Called and informed [facility] RN of Dr Raza's recommendations as stated below. Updated letter faxed as well.     [facility] requests updated prescription be sent to pharmacy.    Rx sent for Novolog and Tresiba.

## 2024-08-30 NOTE — TELEPHONE ENCOUNTER
[facility] called to speak with a nurse in regards to the patient's insulin. Please call.   RN was stating that they were instructed to call the office if the patient's blood sugar over 300.

## 2024-08-30 NOTE — TELEPHONE ENCOUNTER
Please change tresiba to 10 in am and 2 at bedtime. Let’s change that prn bedtime order to only 5 units. Thanks

## 2024-08-30 NOTE — TELEPHONE ENCOUNTER
Spoke to Amanda PEREIRA and communicated new orders. Per nurse please send new order to Fax [phone].     Letter sent with new recommendations

## 2024-08-30 NOTE — TELEPHONE ENCOUNTER
In addition adjust Humalog as below     BF  120-150 6 units  151-199 6 units  200-249 8units  250-300 10 units  300-400 12units     Lunch  120-150 2 units  151-199 3 units  200-249 5 units  250-299 6 units  300-349 7 units  350-400 8 units     120-150 4 units  151-199 5 units  200-249 6 units  250-299 8 units  300-349 10 units  350-450 12 units

## 2024-08-30 NOTE — TELEPHONE ENCOUNTER
Dr. Raza,     Called [facility] and spoke with her nurse Amanda. Pt BG have been recently elevated. They called On-Call on 8/23 and were given verbal orders to provide 10 units at that time and given sliding scale, PRN orders for 10 units if over 350 at bedtime. Per nurse, will need orders to be faxed and new scripts with any changes.      Hyperglycemia     Onset of hyperglycemia:     BG levels:     Fast BL After Dinner (2 hr)  8/23   461  8/24 315  305  8/25  375    8/26 305  326    8/30 267 179    Symptoms: Denies    Pattern of hyperglycemia: Varies    Steroid therapy: No    Acute Illness: UTI, on antibiotics macrobid 100 mg BID for 7 days (8/15)    Change in Diet: 3 meals with snacks   Below 100: OJ    List DM Medications/Compliance:  Novolog per sliding scale     BF  120-150 5 units  151-199 5 units  200-249 7 units  250-300 8 units  300-400 10 units    Lunch  120-150 2 units  151-199 3 units  200-249 5 units  250-299 6 units  300-349 7 units  350-400 8 units    120-150 2 units  151-199 3 units  200-249 5 units  250-299 6 units  300-349 8 units  350-450 10 units    Tresiba 8 units AM

## 2024-09-03 ENCOUNTER — TELEPHONE (OUTPATIENT)
Dept: ENDOCRINOLOGY CLINIC | Facility: CLINIC | Age: 74
End: 2024-09-03

## 2024-09-03 NOTE — TELEPHONE ENCOUNTER
Ioana wanted to know if patient should be seen sooner than 12/12/2024 as patient has been experiencing roller coasting blood sugars.  Also states patient experienced a seizure like event where the patient slumped over with some shaking - was told to see a Neurologist by [facility] - states patient is scheduled to see Neurologist 9/26/2024.  Please call.  Thank you.

## 2024-09-03 NOTE — TELEPHONE ENCOUNTER
Dr Raza,    Patient requesting sooner visit due to recent seizure like incident and fluctuating blood sugars (recent change in meds due to hyperglycemia on 8/30)     \"[facility]\" apts available on 10/23-10/25. Please advise, thanks

## 2024-09-04 NOTE — TELEPHONE ENCOUNTER
Ok, noted.  That is fine to hold off on sooner appt.  I thought family was requesting visit.  I'll review new glucose readings from [facility]. Thanks.

## 2024-09-04 NOTE — TELEPHONE ENCOUNTER
Dr Raza,     Family is asking if its necessary for the sooner apt. Family prefers to wait until December.     Received last 2 weeks of blood sugars from Amanda LEONG at the Assisted Living.     BG range after insulin adjustment on 8/30: 118-328    *BG placed in providers folder to review.    Current Diabetes Regimen:    Humalog sliding scale with meals and PRN 5 units if BG >350  BF  120-150 6 units  151-199 6 units  200-249 8units  250-300 10 units  300-400 12units     Lunch  120-150 2 units  151-199 3 units  200-249 5 units  250-299 6 units  300-349 7 units  350-400 8 units     Dinner  120-150 4 units  151-199 5 units  200-249 6 units  250-299 8 units  300-349 10 units  350-450 12 units    Tresiba  10 Units in am and 2 units at bedtime.

## 2024-09-04 NOTE — TELEPHONE ENCOUNTER
You can offer one of the VV spots on 9/20 then sister can bring in Dorian before visit for download. Also get BG levels from Assisted living.

## 2024-09-04 NOTE — TELEPHONE ENCOUNTER
Called Ioana (sister) she will call to schedule nurse visit for this week to download rangel sensor.     Called [facility] at [phone], LVM for nurse to call back.    Called Ioana back and offered in person visit tomorrow at 315 pm. Ioana to call back to confirm apt.

## 2024-09-05 NOTE — TELEPHONE ENCOUNTER
Called [facility] and spoke with RANDALL Patel. Provided the below change. RN requested that updated regimen be faxed to [phone].     Regimen faxed.     Confirmation received.

## 2024-09-05 NOTE — TELEPHONE ENCOUNTER
Reviewed BG levels.  She continues to have significant variability.      Increase Tresiba to 10 units subcutaneous QAM; 4 units subcutaneous QPM. Thanks.     Continue current dose of Humalog. Thanks.

## 2024-09-26 ENCOUNTER — OFFICE VISIT (OUTPATIENT)
Dept: NEUROLOGY | Facility: CLINIC | Age: 74
End: 2024-09-26
Payer: MEDICARE

## 2024-09-26 ENCOUNTER — TELEPHONE (OUTPATIENT)
Dept: NEUROLOGY | Facility: CLINIC | Age: 74
End: 2024-09-26

## 2024-09-26 VITALS
DIASTOLIC BLOOD PRESSURE: 59 MMHG | HEART RATE: 71 BPM | SYSTOLIC BLOOD PRESSURE: 121 MMHG | WEIGHT: 120 LBS | BODY MASS INDEX: 21 KG/M2 | RESPIRATION RATE: 16 BRPM

## 2024-09-26 DIAGNOSIS — Z86.73 HISTORY OF STROKE: ICD-10-CM

## 2024-09-26 DIAGNOSIS — F01.511 VASCULAR DEMENTIA WITH AGITATION, UNSPECIFIED DEMENTIA SEVERITY (HCC): Primary | ICD-10-CM

## 2024-09-26 DIAGNOSIS — G45.9 TIA (TRANSIENT ISCHEMIC ATTACK): ICD-10-CM

## 2024-09-26 DIAGNOSIS — R41.0 TRANSIENT CONFUSION: ICD-10-CM

## 2024-09-26 PROCEDURE — 99204 OFFICE O/P NEW MOD 45 MIN: CPT | Performed by: OTHER

## 2024-09-26 RX ORDER — PSEUDOEPHEDRINE HCL 30 MG
TABLET ORAL
COMMUNITY

## 2024-09-26 RX ORDER — CETIRIZINE HYDROCHLORIDE 10 MG/1
1 TABLET ORAL DAILY
COMMUNITY

## 2024-09-26 RX ORDER — LOPERAMIDE HYDROCHLORIDE 2 MG/1
TABLET ORAL
COMMUNITY
Start: 2023-09-06

## 2024-09-26 RX ORDER — ERGOCALCIFEROL 1.25 MG/1
1 CAPSULE ORAL
COMMUNITY

## 2024-09-26 RX ORDER — HYDROCODONE BITARTRATE AND ACETAMINOPHEN 5; 325 MG/1; MG/1
TABLET ORAL
COMMUNITY
Start: 2024-01-28

## 2024-09-26 RX ORDER — CIPROFLOXACIN HYDROCHLORIDE 500 MG/1
TABLET, FILM COATED ORAL
COMMUNITY
Start: 2024-06-12

## 2024-09-26 RX ORDER — ATORVASTATIN CALCIUM 40 MG/1
TABLET, FILM COATED ORAL
COMMUNITY

## 2024-09-26 RX ORDER — SACCHAROMYCES BOULARDII 250 MG
1 CAPSULE ORAL
COMMUNITY
Start: 2024-06-12

## 2024-09-26 RX ORDER — CITALOPRAM HYDROBROMIDE 40 MG/1
40 TABLET ORAL DAILY
COMMUNITY
End: 2024-09-26 | Stop reason: ALTCHOICE

## 2024-09-26 RX ORDER — ALBUTEROL SULFATE 90 UG/1
1 INHALANT RESPIRATORY (INHALATION) EVERY 4 HOURS PRN
COMMUNITY

## 2024-09-26 NOTE — TELEPHONE ENCOUNTER
Pt brought copy of neuropsychological testing to office visit today.  Reviewed by provider and copy sen tot scanning.

## 2024-09-26 NOTE — H&P
Neurology H&P    Maite Cavazos Patient Status:  No patient class for patient encounter    1950 MRN [id number]   Location [facility], [address] [place] [address] Attending No att. providers found   Hosp Day # 0 PCP Macey Ramos MD     Subjective:  Maite Cavazos is a(n) 74 year old female with past medical history of multiple strokes, HL, HTN, anxiety, depression, diabetes type 1, vascular dementia, and who presents to the neurology clinic for evaluation of memory loss and history of strokes. She comes with her daughter today who provides the history. She is living in [facility] and her daughter is her POA. Per daughter they first started noticing memory loss about 3 years ago. She is not driving. She has no FH of dementia. She has never been on any medications for dementia. She has had neurocognitive testing done recently.  In brief it showed severe impairment in global executive function and complex problem-solving.  Per notes they recommended one-to-one supervision and that she establish a POA and would need support for all medical financial and legal decisions. She had her first stroke in . Per daughter she may have had about 6 more after that. She has had weakness on the LUE and LLE and slurred speech since her stroke. She would sometimes get much more weakness after progressive strokes but would recover at least some. She is mostly wheelchair bound but very rarely can use a walker to go to the restroom with assistance per daughter. She is on Plavix and Atorvastatin. She has seen cardiology and has a LINQ device implanted. Per daughter no Afib has been seen to date. Per daughter she has had 4 episodes over the past 2 months of transient AMS. Or near syncope. Per notes brought today from her facility she had an episode on 24 where she was outside with her sister and then leaned over to the left and passed out for <1 min. She awoke and had no confusion  or AMS and was laughing. She had a second episode described exactly the same in 9/1/24. She had a third episode where she \"zoned out\" for a few seconds while eating breakfast. She was being treated for a UTI at the time. The 4th event happened on 6/1/24 and was just described as a \"short seizure on notes sent form [facility]\" without any description of events. She has no previous h/o seizures and has never been on any ASM      Current Medications:  Current Outpatient Medications   Medication Sig Dispense Refill    albuterol 108 (90 Base) MCG/ACT Inhalation Aero Soln Inhale 1 puff into the lungs every 4 (four) hours as needed.      atorvastatin 40 MG Oral Tab TAKE 1 TABLET BY MOUTH DAILY for 90      cetirizine 10 MG Oral Tab Take 1 tablet (10 mg total) by mouth daily.      CIPRO 500 MG Oral Tab 1 tablet Orally every 12 hrs for 5 days      Continuous Glucose Sensor (FREESTYLE SREEKANTH 2 SENSOR) Does not apply Misc every 14 (fourteen) days.      diphenhydrAMINE-APAP, sleep,  MG Oral Tab Take 1 tablet by mouth As Directed.      docusate sodium 100 MG Oral Cap 1 capsule as needed twice a day as needed      ergocalciferol 1.25 MG (68080 UT) Oral Cap 1 capsule (50,000 Units total).      HYDROcodone-acetaminophen 5-325 MG Oral Tab 1 tablet as needed Q6H PRN      Loperamide HCl (IMODIUM A-D) 2 MG Oral Tab 1 tablet after each loose stool as needed Orally Four times a day for 30 days      Nutritional Supplements (GLUCERNA SHAKE) Oral Liquid as directed Orally      saccharomyces boulardii 250 MG Oral Cap 1 capsule (250 mg total).      insulin aspart (NOVOLOG FLEXPEN) 100 Units/mL Subcutaneous Solution Pen-injector Inject 3 times daily with meals in accordance to sliding scale. Max daily dose 27 units. Breakfast:120-150 6 units, 151-199 6 units, 200-249 8 units, 250-300 10 units, 300-400 12units. Lunch 120-150 2 units,151-199 3 units, 200-249 5 units, 250-299 6 units, 300-349 7 units, 350-400 8 units. Dinner 120-150 4 units, 151-199  5 units, 200-249 6 units, 250-299 8 units, 300-349 10 units, 350-450 12 units. 27 mL 0    insulin degludec 100 units/mL Subcutaneous Solution Pen-injector Inject 10 units in the morning and 2 units at bedtime daily. 12 mL 1    escitalopram (LEXAPRO) 20 MG Oral Tab Take 1 tablet (20 mg total) by mouth every morning. 30 tablet 11    Insulin Aspart, w/Niacinamide, (FIASP FLEXTOUCH) 100 UNIT/ML Subcutaneous Solution Pen-injector Inject 3 times daily with meals in accordance to sliding scale. Max daily dose 27 units. 27 mL 0    BREO ELLIPTA 100-25 MCG/ACT Inhalation Aerosol Powder, Breath Activated Inhale 1 puff into the lungs daily.      oxybutynin 5 MG Oral Tab Take 1 tablet (5 mg total) by mouth daily.      Insulin Pen Needle (BD AUTOSHIELD DUO) 30G X 5 MM Does not apply Misc Pt injects insulin 5 times daily 500 each 1    glucagon (GVOKE HYPOPEN 2-PACK) 1 MG/0.2ML Subcutaneous SUBQ injection Inject 0.2 mL (1 mg total) into the skin.      amLODIPine 2.5 MG Oral Tab Take 1 tablet (2.5 mg total) by mouth daily.      clopidogrel 75 MG Oral Tab Take 1 tablet (75 mg total) by mouth daily.      losartan 50 MG Oral Tab Take 1 tablet (50 mg total) by mouth daily.      cholecalciferol 50 MCG (2000 UT) Oral Cap Take 1 capsule (2,000 Units total) by mouth daily. 1 tablet everyday      Ca Phosphate-Cholecalciferol (CALTRATE GUMMY BITES OR) Take by mouth.      glucose (GLUTOSE 15) 40% Oral Gel Take 37.5 mL (15 g total) by mouth once.      aspirin 81 MG Oral Tab EC Take 1 tablet (81 mg total) by mouth daily.         Problem List:  Patient Active Problem List   Diagnosis    Type 1 diabetes mellitus (HCC)    Major depressive disorder, single episode, moderate (HCC)    Vascular dementia with agitation (HCC)    Chest pain of uncertain etiology    Depression    Essential hypertension       PMHx:  Past Medical History:    Anxiety state    Depression    Essential hypertension    High blood pressure    Type 1 diabetes mellitus (HCC)     Vascular dementia (HCC)       PSHx:  No past surgical history on file.    SocHx:  Social History     Socioeconomic History    Marital status:    Tobacco Use    Smoking status: Former     Types: Cigarettes    Smokeless tobacco: Never   Vaping Use    Vaping status: Never Used   Substance and Sexual Activity    Alcohol use: Never    Drug use: Never   Other Topics Concern    Caffeine Concern Yes     Comment: daily    Exercise No     Comment: inconsistent     Social Determinants of Health     Food Insecurity: No Food Insecurity (5/18/2024)    Food Insecurity     Food Insecurity: Never true   Transportation Needs: No Transportation Needs (5/18/2024)    Transportation Needs     Lack of Transportation: No    [facility]    Social Connections   Housing Stability: Low Risk  (5/18/2024)    Housing Stability     Housing Instability: No       Family History:  No family history on file.    No FH of dementia    ROS:  10 point ROS completed and was negative, except for pertinent positive and negatives stated in subjective.    Objective/Physical Exam:    Vital Signs:  Blood pressure 121/59, pulse 71, resp. rate 16, weight 120 lb (54.4 kg).    Gen: Awake and in no apparent distress  HEENT: moist mucus membranes  Neck: Supple  Cardiovascular: Regular rate and rhythm, no murmur  Pulm: CTAB  GI: non-tender, normal bowel sounds  Skin: normal, dry  Extremities: No clubbing or cyanosis      Neurologic:   MENTAL STATUS: alert, ox3, normal attention, language and fund of knowledge.      CRANIAL NERVES II to XII: PERRLA, no ptosis or diplopia, EOM intact, facial sensation intact, strong eye closure, Mild L facial weakness, mild dysarthria, tongue midline,  no tongue fasciculations or atrophy, strong shoulder shrug.    MOTOR EXAMINATION:   MSK:  RUE: Delt:  5/5, Bi: 5/5, Tri: 5/5, : 5/5, interossei: 5/5, APB: 5/5, FE: 5/5  LUE: Delt:  spastic paraplegia  RLE: HF:  4+/5, KE: 5/5, KF: 5/5, DF: 5/5, PF: 5/5   LLE:  HF: 4-/5, KE: 4/5, KF: 4/5, DF: 4/5, PF: 4/5   Normal Tone  No Fasiculations      SENSORY EXAMINATION:  UE: intact to light touch   LE: intact to light touch     COORDINATION:  No dysmetria, or intention tremors in the RUE         GAIT: wheelchair bound        Labs:       Imaging:  [facility] 8/16/24  CONCLUSION:   1. No acute intracranial findings   2. Cerebral atrophy with chronic microvascular ischemic changes.  Remote right frontal temporal infarct.     Assessment:  This is a 74-year-old female with history of multiple previous strokes, vascular dementia with behavior disturbance, and who comes in neurology clinic to establish care for severe vascular dementia and previous history of strokes.  She is currently on aspirin and atorvastatin for secondary stroke prevention.  Per daughter her first stroke started many years ago and she has had at least 7 since that time.  She did have a recent CAT scan of the head and August 24.  This was reviewed today.  A larger right frontal lobe stroke was seen along with marked microvascular ischemic disease and at least moderate to marked global parenchymal atrophy.  I have no other imaging that I am able to view at this time.  I would like to get an MRI/MRA of the brain as well as carotid ultrasound.  She can continue her aspirin and statin at this time.  Per daughter she also has had 4 episodes over the past few months where she either has a transient staring spell or possibly confusion.  Per notes from her assisted living facility is very unclear what these are.  Sometimes she is just seems to lean towards 1 side or the other sometimes stares ahead for a few seconds.  1 event is described as seizure which was noted by a  but there is no further explanation and per the same know she had no postictal confusion or shaking anything else that was noted.  Will get an EEG but hold off on any ASM for now.  As for her  dementia she has had recent neurocognitive testing.  Per report in brief it is severe global neurocognitive dysfunction.  She is very pleasant to me today.  I did discuss starting donepezil and/or Namenda with the daughter who is the POA.  It was decided not to initiate any new medications at this time.      Plan:  Vascular dementia  - severe  - declined ay Namenda or Donepezil  - Neurocognitive testing reviewed    2. H/o stroke with residual L sided weakness  - Continue aspirin and statin  - Recent CTH reviewed  - MRI/MRA  - [facility]        Herbert Marino, DO  Neurology

## 2024-09-26 NOTE — PATIENT INSTRUCTIONS
After your visit at the [facility] today,  please direct any follow up questions or medication needs to the staff in our [place] office so that your concerns may be promptly addressed.  We are available through [employer] or at the numbers below:    The phone number is:   [phone] option #1    The fax number is:  [phone]    Your pharmacy should also send any requests electronically to the [place] office.  Refill policies:    Allow 2-3 business days for refills; controlled substances may take longer.  Contact your pharmacy at least 5 days prior to running out of medication and have them send an electronic request or submit request through the “request refill” option in your [employer] account.  Refills are not addressed on weekends; covering physicians do not authorize routine medications on weekends.  No narcotics or controlled substances are refilled after noon on Fridays or by on call physicians.  By law, narcotics must be electronically prescribed.  A 30 day supply with no refills is the maximum allowed.  If your prescription is due for a refill, you may be due for a follow up appointment.  To best provide you care, patients receiving routine medications need to be seen at least once a year.  Patients receiving narcotic/controlled substance medications need to be seen at least once every 3 months.  In the event that your preferred pharmacy does not have the requested medication in stock (e.g. Backordered), it is your responsibility to find another pharmacy that has the requested medication available.  We will gladly send a new prescription to that pharmacy at your request.    Scheduling Tests:    If your physician has ordered radiology tests such as MRI or CT scans, please contact Central Scheduling at [phone] right away to schedule the test.  Once scheduled, the [facility] Centralized Referral Team will work with your insurance carrier to obtain pre-certification or prior authorization.   Depending on your insurance carrier, approval may take 3-10 days.  It is highly recommended patients assure they have received an authorization before having a test performed.  If test is done without insurance authorization, patient may be responsible for the entire amount billed.      Precertification and Prior Authorizations:  If your physician has recommended that you have a procedure or additional testing performed the [facility] Centralized Referral Team will contact your insurance carrier to obtain pre-certification or prior authorization.    You are strongly encouraged to contact your insurance carrier to verify that your procedure/test has been approved and is a COVERED benefit.  Although the [facility] Centralized Referral Team does its due diligence, the insurance carrier gives the disclaimer that \"Although the procedure is authorized, this does not guarantee payment.\"    Ultimately the patient is responsible for payment.   Thank you for your understanding in this matter.  Paperwork Completion:  If you require FMLA or disability paperwork for your recovery, please make sure to either drop it off or have it faxed to our office at [phone]. Be sure the form has your name and date of birth on it.  The form will be faxed to our Forms Department and they will complete it for you.  There is a 25$ fee for all forms that need to be filled out.  Please be aware there is a 10-14 day turnaround time.  You will need to sign a release of information (PRASHANT) form if your paperwork does not come with one.  You may call the Forms Department with any questions at [phone].  Their fax number is [phone].

## 2024-09-26 NOTE — PROGRESS NOTES
Pt reports a history of 7 strokes.  Pt's daughter reports they believe the last stroke was around 3 years ago.    Pt denies any new signs or symptoms of strokes.     Daughter reports the patient has episodes of \"zoning out\" then slumping to the side.  Daughter reports pt may have had 5 in the last 3 months.

## 2024-09-29 ENCOUNTER — HOSPITAL ENCOUNTER (EMERGENCY)
Facility: HOSPITAL | Age: 74
Discharge: HOME | End: 2024-09-29
Attending: EMERGENCY MEDICINE
Payer: MEDICARE

## 2024-09-29 ENCOUNTER — APPOINTMENT (OUTPATIENT)
Dept: GENERAL RADIOLOGY | Facility: HOSPITAL | Age: 74
End: 2024-09-29
Attending: EMERGENCY MEDICINE
Payer: MEDICARE

## 2024-09-29 VITALS
DIASTOLIC BLOOD PRESSURE: 94 MMHG | HEART RATE: 61 BPM | RESPIRATION RATE: 18 BRPM | SYSTOLIC BLOOD PRESSURE: 185 MMHG | TEMPERATURE: 96 F | OXYGEN SATURATION: 98 %

## 2024-09-29 DIAGNOSIS — R07.9 RIGHT-SIDED CHEST PAIN: Primary | ICD-10-CM

## 2024-09-29 DIAGNOSIS — M94.0 COSTOCHONDRITIS: ICD-10-CM

## 2024-09-29 LAB
ALBUMIN SERPL-MCNC: 4.1 G/DL (ref 3.2–4.8)
ALBUMIN/GLOB SERPL: 2.1 {RATIO} (ref 1–2)
ALP LIVER SERPL-CCNC: 85 U/L
ALT SERPL-CCNC: 12 U/L
ANION GAP SERPL CALC-SCNC: 3 MMOL/L (ref 0–18)
AST SERPL-CCNC: 13 U/L (ref ?–34)
BASOPHILS # BLD AUTO: 0.04 X10(3) UL (ref 0–0.2)
BASOPHILS NFR BLD AUTO: 0.7 %
BILIRUB SERPL-MCNC: 0.5 MG/DL (ref 0.2–1.1)
BUN BLD-MCNC: 20 MG/DL (ref 9–23)
CALCIUM BLD-MCNC: 9.7 MG/DL (ref 8.7–10.4)
CHLORIDE SERPL-SCNC: 107 MMOL/L (ref 98–112)
CO2 SERPL-SCNC: 31 MMOL/L (ref 21–32)
CREAT BLD-MCNC: 0.81 MG/DL
EGFRCR SERPLBLD CKD-EPI 2021: 76 ML/MIN/1.73M2 (ref 60–?)
EOSINOPHIL # BLD AUTO: 0.22 X10(3) UL (ref 0–0.7)
EOSINOPHIL NFR BLD AUTO: 4.1 %
ERYTHROCYTE [DISTWIDTH] IN BLOOD BY AUTOMATED COUNT: 12.8 %
FLUAV + FLUBV RNA SPEC NAA+PROBE: NEGATIVE
FLUAV + FLUBV RNA SPEC NAA+PROBE: NEGATIVE
GLOBULIN PLAS-MCNC: 2 G/DL (ref 2–3.5)
GLUCOSE BLD-MCNC: 166 MG/DL (ref 70–99)
HCT VFR BLD AUTO: 31.6 %
HGB BLD-MCNC: 11.1 G/DL
IMM GRANULOCYTES # BLD AUTO: 0.01 X10(3) UL (ref 0–1)
IMM GRANULOCYTES NFR BLD: 0.2 %
LYMPHOCYTES # BLD AUTO: 1.78 X10(3) UL (ref 1–4)
LYMPHOCYTES NFR BLD AUTO: 32.8 %
MCH RBC QN AUTO: 31.1 PG (ref 26–34)
MCHC RBC AUTO-ENTMCNC: 35.1 G/DL (ref 31–37)
MCV RBC AUTO: 88.5 FL
MONOCYTES # BLD AUTO: 0.46 X10(3) UL (ref 0.1–1)
MONOCYTES NFR BLD AUTO: 8.5 %
NEUTROPHILS # BLD AUTO: 2.91 X10 (3) UL (ref 1.5–7.7)
NEUTROPHILS # BLD AUTO: 2.91 X10(3) UL (ref 1.5–7.7)
NEUTROPHILS NFR BLD AUTO: 53.7 %
OSMOLALITY SERPL CALC.SUM OF ELEC: 298 MOSM/KG (ref 275–295)
PLATELET # BLD AUTO: 282 10(3)UL (ref 150–450)
POTASSIUM SERPL-SCNC: 4 MMOL/L (ref 3.5–5.1)
PROT SERPL-MCNC: 6.1 G/DL (ref 5.7–8.2)
RBC # BLD AUTO: 3.57 X10(6)UL
RSV RNA SPEC NAA+PROBE: NEGATIVE
SARS-COV-2 RNA RESP QL NAA+PROBE: NOT DETECTED
SODIUM SERPL-SCNC: 141 MMOL/L (ref 136–145)
TROPONIN I SERPL HS-MCNC: 6 NG/L
WBC # BLD AUTO: 5.4 X10(3) UL (ref 4–11)

## 2024-09-29 PROCEDURE — 36415 COLL VENOUS BLD VENIPUNCTURE: CPT

## 2024-09-29 PROCEDURE — 99285 EMERGENCY DEPT VISIT HI MDM: CPT

## 2024-09-29 PROCEDURE — 93010 ELECTROCARDIOGRAM REPORT: CPT

## 2024-09-29 PROCEDURE — 93005 ELECTROCARDIOGRAM TRACING: CPT

## 2024-09-29 PROCEDURE — 0241U SARS-COV-2/FLU A AND B/RSV BY PCR (GENEXPERT): CPT | Performed by: EMERGENCY MEDICINE

## 2024-09-29 PROCEDURE — 99284 EMERGENCY DEPT VISIT MOD MDM: CPT

## 2024-09-29 PROCEDURE — 85025 COMPLETE CBC W/AUTO DIFF WBC: CPT | Performed by: EMERGENCY MEDICINE

## 2024-09-29 PROCEDURE — 80053 COMPREHEN METABOLIC PANEL: CPT | Performed by: EMERGENCY MEDICINE

## 2024-09-29 PROCEDURE — 84484 ASSAY OF TROPONIN QUANT: CPT | Performed by: EMERGENCY MEDICINE

## 2024-09-29 PROCEDURE — 71045 X-RAY EXAM CHEST 1 VIEW: CPT | Performed by: EMERGENCY MEDICINE

## 2024-09-29 NOTE — ED INITIAL ASSESSMENT (HPI)
Pt to the emergency room for chest pain that started 30 mins prior to arriva.l. Pt blood pressure elevated pta and pt given asa 324 and nitroglycerin 1 tab for the symptoms with improvement in pain noted. Pt AOx2 no distress, no complaints of pain per patient

## 2024-09-29 NOTE — ED PROVIDER NOTES
Patient Seen in: [facility] Emergency Department      History     Chief Complaint   Patient presents with    Chest Pain Angina     Stated Complaint: CP X3O MINS AGO.    Subjective:   HPI    The 74-year-old female patient woke up fine but started experiencing chest pain on the right side while doing something around the house. The pain was localized over the right rib. There was no pain on the left side of the chest. The patient denies any cough or fever.  Denies any diaphoresis no nausea or vomiting.  Patient states she does not had pain in the right side of her chest before medics arrived and to give her nitro and she states it did make her feel little better.    Objective:   Past Medical History:    Anxiety state    Depression    Essential hypertension    High blood pressure    Type 1 diabetes mellitus (HCC)    Vascular dementia (HCC)              History reviewed. No pertinent surgical history.             Social History     Socioeconomic History    Marital status:    Tobacco Use    Smoking status: Former     Types: Cigarettes    Smokeless tobacco: Never   Vaping Use    Vaping status: Never Used   Substance and Sexual Activity    Alcohol use: Never    Drug use: Never   Other Topics Concern    Caffeine Concern Yes     Comment: daily    Exercise No     Comment: inconsistent     Social Determinants of Health     Food Insecurity: No Food Insecurity (5/18/2024)    Food Insecurity     Food Insecurity: Never true   Transportation Needs: No Transportation Needs (5/18/2024)    Transportation Needs     Lack of Transportation: No    [facility], [facility]    Social Connections   Housing Stability: Low Risk  (5/18/2024)    Housing Stability     Housing Instability: No              Review of Systems    Positive for stated Chief Complaint: Chest Pain Angina    Other systems are as noted in HPI.  Constitutional and vital signs reviewed.      All other systems  reviewed and negative except as noted above.    Physical Exam     ED Triage Vitals [09/29/24 0336]   BP (!) 175/98   Pulse 60   Resp 16   Temp (!) 96.4 °F (35.8 °C)   Temp src Temporal   SpO2 99 %   O2 Device None (Room air)       Current Vitals:   Vital Signs  BP: (!) 185/94  Pulse: 61  Resp: 18  Temp: (!) 96.4 °F (35.8 °C)  Temp src: Temporal  MAP (mmHg): (!) 119    Oxygen Therapy  SpO2: 98 %  O2 Device: None (Room air)            Physical Exam      Vital signs reviewed  General appearance: Patient is alert and in no acute distress  HEENT: Pupils equal react to light extraocular muscles intact no scleral icterus, mucous membranes are moist, there is no erythema or exudate in the posterior pharynx  Neck: Supple no JVD no lymphadenopathy no meningismus no carotid bruit  CV: Regular rate and rhythm no murmur rub, reproducible chest pain over the right anterior chest wall  Respiratory: Clear to auscultation bilaterally no crackles no wheezes no accessory muscle use  Abdomen: Soft nontender nondistended, no rebound no guarding  no hepatosplenomegaly bowel sounds are present , no pulsatile mass  Extremities: No clubbing cyanosis or edema 2+ distal pulses.  Neuro: Cranial nerves II through XII intact with no gross focal sensory or motor abnormality.      ED Course     Labs Reviewed   COMP METABOLIC PANEL (14) - Abnormal; Notable for the following components:       Result Value    Glucose 166 (*)     Calculated Osmolality 298 (*)     A/G Ratio 2.1 (*)     All other components within normal limits   CBC WITH DIFFERENTIAL WITH PLATELET - Abnormal; Notable for the following components:    RBC 3.57 (*)     HGB 11.1 (*)     HCT 31.6 (*)     All other components within normal limits   TROPONIN I HIGH SENSITIVITY - Normal   SARS-COV-2/FLU A AND B/RSV BY PCR (GENEXPERT) - Normal    Narrative:     This test is intended for the qualitative detection and differentiation of SARS-CoV-2, influenza A, influenza B, and respiratory  syncytial virus (RSV) viral RNA in nasopharyngeal or nares swabs from individuals suspected of respiratory viral infection consistent with COVID-19 by their healthcare provider. Signs and symptoms of respiratory viral infection due to SARS-CoV-2, influenza, and RSV can be similar.    Test performed using the Xpert Xpress SARS-CoV-2/FLU/RSV (real time RT-PCR)  assay on the Gene[employer]pert instrument, [place], [place] [address].   This test is being used under the Food and Drug Administration's Emergency Use Authorization.    The authorized Fact Sheet for Healthcare Providers for this assay is available upon request from the laboratory.   RAINBOW DRAW LAVENDER   RAINBOW DRAW LIGHT GREEN   RAINBOW DRAW BLUE   RAINBOW DRAW GOLD     EKG    Rate, intervals and axes as noted on EKG Report.  Rate: 56  Rhythm: Sinus Rhythm  Reading: Sinus bradycardia low voltage QRS does appear changed from a prior EKG from 8/18/2024                 Patient was evaluated had a CBC chemistry troponin we will also get a chest x-ray.  She is currently pain-free and states maybe she had reflux.  All her pain was on the right side nothing on the left and described it as a sharp stabbing pain.  Will reassess after x-ray and labs.      XR CHEST AP PORTABLE  (CPT=71045)    Result Date: 9/29/2024  CONCLUSION:  No active cardiopulmonary process identified.  A preliminary report was provided by the [facility] teleradiology service.   LOCATION:  [facility]      Dictated by (CST): Stromberg, LeRoy, MD on 9/29/2024 at 6:46 AM     Finalized by (CST): Stromberg, LeRoy, MD on 9/29/2024 at 6:48 AM          Patient's chest x-ray was unremarkable cardiac enzymes were negative.  COVID flu and RSV were negative.  Do feel may have just some costochondritis did have a full workup for cardiac a few months ago.  Patient does feel she can go home her symptoms do not sound cardiac and she is in no distress       MDM      Differential diagnosis reflecting the complexity of care  include: Costochondritis angina, bronchitis, pneumonia, congestive heart failure    Comorbidities that add complexity to management include: Diabetes, hypertension, depression, vascular dementia    External chart review was done and was noted: Patient's discharge summary from an admission for similar chest pain was reviewed and showed the following  Date of Admission: 5/18/2024  Date of Discharge:  5/20/2024      Discharge Disposition: Home or Self Care     Discharge Diagnosis:        History of Present Illness: 74 year old female with history of vascular dementia, type 1 diabetes, hypertension, hyperlipidemia depression presents emergency room with chest pain started last night substernal with no radiation.  Started before coming to the emergency room.  Patient did states she felt a little bit of shortness of breath when having the pain.  She denies any dizziness, lightheadedness, diaphoresis, nausea, vomiting.  Reported the patient was given aspirin and sublingual nitro spray prior to paramedics coming.      Brief Synopsis: Patient was admitted to cardiac telemetry floor for atypical chest pain and echocardiogram was ordered to check for wall motion abnormalities.  She also has history of diabetes hyperlipidemia hypertension.  Patient has history of vascular dementia and she had a stroke 7 years ago.  She had sinus pauses and she was evaluated by EP with no recommendations for pacemaker.  Patient remained stable troponin negative echo stable and she had no further chest pain.     Lace+ Score: 70  59-90 High Risk  29-58 Medium Risk  0-28   Low Risk     TCM Follow-Up Recommendation:  LACE > 58: High Risk of readmission after discharge from the hospital.        Procedures during hospitalization:         Incidental or significant findings and recommendations (brief descriptions):     Lab/Test results pending at Discharge:         Consultants:  cardiology     Discharge Medication List:      Discharge Medications           CONTINUE taking these medications         Instructions Prescription details   amLODIPine 2.5 MG Tabs  Commonly known as: Norvasc       Take 1 tablet (2.5 mg total) by mouth daily.    Refills: 0      aspirin 81 MG Tbec       Take 1 tablet (81 mg total) by mouth daily.    Refills: 0      BD AutoShield Duo 30G X 5 MM Misc  Generic drug: Insulin Pen Needle       Pt injects insulin 5 times daily    Quantity: 500 each  Refills: 1      Breo Ellipta 100-25 MCG/ACT Aepb  Generic drug: fluticasone furoate-vilanterol       Inhale 1 puff into the lungs daily.    Refills: 0      CALTRATE GUMMY BITES OR  Notes to patient: Resume home regimen        Take by mouth.    Refills: 0      cholecalciferol 50 MCG (2000 UT) Caps  Commonly known as: Vitamin D3       Take 1 capsule (2,000 Units total) by mouth daily. 1 tablet everyday    Refills: 0      clopidogrel 75 MG Tabs  Commonly known as: Plavix       Take 1 tablet (75 mg total) by mouth daily.    Refills: 0      escitalopram 10 MG Tabs  Commonly known as: Lexapro       Take 1 tablet (10 mg total) by mouth daily.    Refills: 0      Glutose 15 40% Gel  Generic drug: glucose       Take 37.5 mL (15 g total) by mouth once.    Refills: 0      Gvoke HypoPen 2-Pack 1 MG/0.2ML injection  Generic drug: glucagon       Inject 0.2 mL (1 mg total) into the skin.    Refills: 0      insulin degludec 100 units/mL Sopn       Inject 6 Units into the skin nightly.    Refills: 0      Tresiba FlexTouch 100 UNIT/ML Sopn  Generic drug: insulin degludec       Inject 8 Units into the skin daily for 10 days.    Stop taking on: May 29, 2024  Quantity: 0.8 mL  Refills: 0      losartan 50 MG Tabs  Commonly known as: Cozaar       Take 1 tablet (50 mg total) by mouth daily.    Refills: 0      oxybutynin 5 MG Tabs  Commonly known as: Ditropan       Take 1 tablet (5 mg total) by mouth daily.    Refills: 0                STOP taking these medications       albuterol (2.5 MG/3ML) 0.083% Nebu  Commonly known as:  Ventolin         Anti-Diarrheal 2 MG Tabs  Generic drug: Loperamide HCl         Budesonide-Formoterol Fumarate 80-4.5 MCG/ACT Aero  Commonly known as: SYMBICORT         insulin detemir 100 UNIT/ML Soln  Commonly known as: Levemir         Lantus SoloStar 100 UNIT/ML Sopn  Generic drug: insulin glargine         Mapap Arthritis Pain 650 MG Tbcr  Generic drug: Acetaminophen ER         montelukast 10 MG Tabs  Commonly known as: Singulair         sulfamethoxazole-trimethoprim -160 MG Tabs per tablet  Commonly known as: Bactrim DS                       Where to Get Your Medications          Please  your prescriptions at the location directed by your doctor or nurse    Bring a paper prescription for each of these medications  Tresiba FlexTouch 100 UNIT/ML Sopn            [place] reviewed:      Follow-up appointment:   Macey Ramos MD  [address]  [facility] [address] [phone]     Schedule an appointment as soon as possible for a visit in 2 week(s)        Navid Frank MD  [address]  [facility] [address] [phone]      My independent interpretation of studies of: Chest x-ray was unremarkable no consolidation or effusion no sign of any failure      Shared decision making was done by self and patient.  Her cardiac workup was unremarkable the pain is completely reproducible over the right anterior chest wall and there is no sign of any rib fracture or lesion there.  Patient will be discharged home follow-up with primary return if worse    Patient was screened and evaluated during this visit.  As the treating physician attending to the patient, I determined within reasonable clinical confidence and prior to discharge, that an emergency medical condition was not or was no longer present.  There was no indication for further evaluation, treatment, or admission on an emergency basis.  Comprehensive verbal and written discharge and follow-up instructions were provided to help  prevent relapse or worsening.  Patient was instructed to follow-up with primary care provider for further evaluation treatment, return immediately to ER for worsening, concerning, new, or changing/persisting symptoms.  I discussed the case with the patient and they had no questions, complaints, or concerns.  Patient was comfortable going home.      Dictation Disclaimer Note:   To increase efficiency this document may have been prepared using voice recognition technology. Every effort has been made to correct any errors made during preparation of this note. However, if a word or phrase is confusing, or does not make sense, this is likely due to a recognition error within the program which was not discovered during editing. Please do not hesitate to contact to address any significant errors.    Note to Patient:   The 21st Century Cures Act makes medical notes like these available to patients in the interest of transparency. Please be advised this is a medical document. Medical documents are intended to carry relevant information, facts as evident, and the clinical opinion of the practitioner. The medical note is intended as peer to peer communication and may appear blunt or direct. It is written in medical language and may contain abbreviations or verbiage that are unfamiliar.                                          Medical Decision Making      Disposition and Plan     Clinical Impression:  1. Right-sided chest pain    2. Costochondritis         Disposition:  Discharge  9/29/2024  4:49 am    Follow-up:  Macey Ramos MD  [address]  [address] [address]  [phone]    Follow up            Medications Prescribed:  Discharge Medication List as of 9/29/2024  7:42 AM

## 2024-09-30 LAB
ATRIAL RATE: 56 BPM
P-R INTERVAL: 164 MS
Q-T INTERVAL: 426 MS
QRS DURATION: 80 MS
QTC CALCULATION (BEZET): 411 MS
R AXIS: -17 DEGREES
T AXIS: 150 DEGREES
VENTRICULAR RATE: 56 BPM

## 2024-10-17 ENCOUNTER — LAB REQUISITION (OUTPATIENT)
Dept: LAB | Facility: HOSPITAL | Age: 74
End: 2024-10-17
Payer: MEDICARE

## 2024-10-17 DIAGNOSIS — I10 ESSENTIAL (PRIMARY) HYPERTENSION: ICD-10-CM

## 2024-10-17 LAB
ANION GAP SERPL CALC-SCNC: 4 MMOL/L (ref 0–18)
BUN BLD-MCNC: 20 MG/DL (ref 9–23)
CALCIUM BLD-MCNC: 10.4 MG/DL (ref 8.7–10.4)
CHLORIDE SERPL-SCNC: 105 MMOL/L (ref 98–112)
CO2 SERPL-SCNC: 29 MMOL/L (ref 21–32)
CREAT BLD-MCNC: 0.82 MG/DL
EGFRCR SERPLBLD CKD-EPI 2021: 75 ML/MIN/1.73M2 (ref 60–?)
GLUCOSE BLD-MCNC: 201 MG/DL (ref 70–99)
OSMOLALITY SERPL CALC.SUM OF ELEC: 294 MOSM/KG (ref 275–295)
POTASSIUM SERPL-SCNC: 4 MMOL/L (ref 3.5–5.1)
SODIUM SERPL-SCNC: 138 MMOL/L (ref 136–145)

## 2024-10-17 PROCEDURE — 80048 BASIC METABOLIC PNL TOTAL CA: CPT | Performed by: FAMILY MEDICINE

## 2024-10-18 ENCOUNTER — LAB REQUISITION (OUTPATIENT)
Dept: LAB | Facility: HOSPITAL | Age: 74
End: 2024-10-18
Payer: MEDICARE

## 2024-10-18 DIAGNOSIS — N39.0 URINARY TRACT INFECTION, SITE NOT SPECIFIED: ICD-10-CM

## 2024-10-18 PROCEDURE — 81001 URINALYSIS AUTO W/SCOPE: CPT

## 2024-10-18 PROCEDURE — 87086 URINE CULTURE/COLONY COUNT: CPT

## 2024-10-19 LAB
BILIRUB UR QL STRIP.AUTO: NEGATIVE
COLOR UR AUTO: YELLOW
GLUCOSE UR STRIP.AUTO-MCNC: >1000 MG/DL
HYALINE CASTS #/AREA URNS AUTO: PRESENT /LPF
KETONES UR STRIP.AUTO-MCNC: NEGATIVE MG/DL
LEUKOCYTE ESTERASE UR QL STRIP.AUTO: NEGATIVE
NITRITE UR QL STRIP.AUTO: NEGATIVE
PH UR STRIP.AUTO: 6.5 [PH] (ref 5–8)
PROT UR STRIP.AUTO-MCNC: NEGATIVE MG/DL
RBC UR QL AUTO: NEGATIVE
SP GR UR STRIP.AUTO: 1.02 (ref 1–1.03)
UROBILINOGEN UR STRIP.AUTO-MCNC: NORMAL MG/DL

## 2024-10-20 ENCOUNTER — APPOINTMENT (OUTPATIENT)
Dept: CT IMAGING | Facility: HOSPITAL | Age: 74
End: 2024-10-20
Attending: EMERGENCY MEDICINE
Payer: MEDICARE

## 2024-10-20 ENCOUNTER — APPOINTMENT (OUTPATIENT)
Dept: GENERAL RADIOLOGY | Facility: HOSPITAL | Age: 74
End: 2024-10-20
Attending: EMERGENCY MEDICINE
Payer: MEDICARE

## 2024-10-20 ENCOUNTER — HOSPITAL ENCOUNTER (EMERGENCY)
Facility: HOSPITAL | Age: 74
Discharge: HOME | End: 2024-10-20
Attending: EMERGENCY MEDICINE
Payer: MEDICARE

## 2024-10-20 VITALS
SYSTOLIC BLOOD PRESSURE: 132 MMHG | TEMPERATURE: 98 F | DIASTOLIC BLOOD PRESSURE: 60 MMHG | BODY MASS INDEX: 20 KG/M2 | RESPIRATION RATE: 23 BRPM | HEART RATE: 83 BPM | WEIGHT: 119.06 LBS | OXYGEN SATURATION: 96 %

## 2024-10-20 DIAGNOSIS — S41.112A SKIN TEAR OF LEFT UPPER ARM WITHOUT COMPLICATION, INITIAL ENCOUNTER: ICD-10-CM

## 2024-10-20 DIAGNOSIS — R26.9 ABNORMALITY OF GAIT: ICD-10-CM
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
## 2024-10-20 LAB
ALBUMIN SERPL-MCNC: 4.5 G/DL (ref 3.2–4.8)
ALBUMIN/GLOB SERPL: 1.9 {RATIO} (ref 1–2)
ALP LIVER SERPL-CCNC: 85 U/L
ALT SERPL-CCNC: 12 U/L
ANION GAP SERPL CALC-SCNC: 4 MMOL/L (ref 0–18)
AST SERPL-CCNC: 17 U/L (ref ?–34)
ATRIAL RATE: 74 BPM
BASOPHILS # BLD AUTO: 0.05 X10(3) UL (ref 0–0.2)
BASOPHILS NFR BLD AUTO: 0.5 %
BILIRUB SERPL-MCNC: 0.8 MG/DL (ref 0.2–1.1)
BILIRUB UR QL STRIP.AUTO: NEGATIVE
BUN BLD-MCNC: 24 MG/DL (ref 9–23)
CALCIUM BLD-MCNC: 10.3 MG/DL (ref 8.7–10.4)
CHLORIDE SERPL-SCNC: 107 MMOL/L (ref 98–112)
CLARITY UR REFRACT.AUTO: CLEAR
CO2 SERPL-SCNC: 31 MMOL/L (ref 21–32)
COLOR UR AUTO: COLORLESS
CREAT BLD-MCNC: 0.91 MG/DL
EGFRCR SERPLBLD CKD-EPI 2021: 66 ML/MIN/1.73M2 (ref 60–?)
EOSINOPHIL # BLD AUTO: 0.19 X10(3) UL (ref 0–0.7)
EOSINOPHIL NFR BLD AUTO: 1.7 %
ERYTHROCYTE [DISTWIDTH] IN BLOOD BY AUTOMATED COUNT: 12.5 %
GLOBULIN PLAS-MCNC: 2.4 G/DL (ref 2–3.5)
GLUCOSE BLD-MCNC: 153 MG/DL (ref 70–99)
GLUCOSE BLD-MCNC: 172 MG/DL (ref 70–99)
GLUCOSE UR STRIP.AUTO-MCNC: 100 MG/DL
HCT VFR BLD AUTO: 36 %
HGB BLD-MCNC: 12.3 G/DL
IMM GRANULOCYTES # BLD AUTO: 0.04 X10(3) UL (ref 0–1)
IMM GRANULOCYTES NFR BLD: 0.4 %
KETONES UR STRIP.AUTO-MCNC: NEGATIVE MG/DL
LEUKOCYTE ESTERASE UR QL STRIP.AUTO: NEGATIVE
LYMPHOCYTES # BLD AUTO: 1.4 X10(3) UL (ref 1–4)
LYMPHOCYTES NFR BLD AUTO: 12.7 %
MCH RBC QN AUTO: 30.8 PG (ref 26–34)
MCHC RBC AUTO-ENTMCNC: 34.2 G/DL (ref 31–37)
MCV RBC AUTO: 90.2 FL
MONOCYTES # BLD AUTO: 0.7 X10(3) UL (ref 0.1–1)
MONOCYTES NFR BLD AUTO: 6.4 %
NEUTROPHILS # BLD AUTO: 8.64 X10 (3) UL (ref 1.5–7.7)
NEUTROPHILS # BLD AUTO: 8.64 X10(3) UL (ref 1.5–7.7)
NEUTROPHILS NFR BLD AUTO: 78.3 %
NITRITE UR QL STRIP.AUTO: NEGATIVE
OSMOLALITY SERPL CALC.SUM OF ELEC: 302 MOSM/KG (ref 275–295)
P AXIS: 81 DEGREES
P-R INTERVAL: 172 MS
PH UR STRIP.AUTO: 6.5 [PH] (ref 5–8)
PLATELET # BLD AUTO: 251 10(3)UL (ref 150–450)
POTASSIUM SERPL-SCNC: 4.8 MMOL/L (ref 3.5–5.1)
PROT SERPL-MCNC: 6.9 G/DL (ref 5.7–8.2)
PROT UR STRIP.AUTO-MCNC: NEGATIVE MG/DL
Q-T INTERVAL: 390 MS
QRS DURATION: 70 MS
QTC CALCULATION (BEZET): 432 MS
R AXIS: 21 DEGREES
RBC # BLD AUTO: 3.99 X10(6)UL
RBC UR QL AUTO: NEGATIVE
SARS-COV-2 RNA RESP QL NAA+PROBE: NOT DETECTED
SODIUM SERPL-SCNC: 142 MMOL/L (ref 136–145)
SP GR UR STRIP.AUTO: 1.01 (ref 1–1.03)
T AXIS: 66 DEGREES
TROPONIN I SERPL HS-MCNC: 6 NG/L
UROBILINOGEN UR STRIP.AUTO-MCNC: NORMAL MG/DL
VENTRICULAR RATE: 74 BPM
WBC # BLD AUTO: 11 X10(3) UL (ref 4–11)

## 2024-10-20 PROCEDURE — 72125 CT NECK SPINE W/O DYE: CPT | Performed by: EMERGENCY MEDICINE

## 2024-10-20 PROCEDURE — 93005 ELECTROCARDIOGRAM TRACING: CPT

## 2024-10-20 PROCEDURE — 82962 GLUCOSE BLOOD TEST: CPT

## 2024-10-20 PROCEDURE — 80053 COMPREHEN METABOLIC PANEL: CPT | Performed by: EMERGENCY MEDICINE

## 2024-10-20 PROCEDURE — 85025 COMPLETE CBC W/AUTO DIFF WBC: CPT | Performed by: EMERGENCY MEDICINE

## 2024-10-20 PROCEDURE — 81003 URINALYSIS AUTO W/O SCOPE: CPT | Performed by: EMERGENCY MEDICINE

## 2024-10-20 PROCEDURE — 73060 X-RAY EXAM OF HUMERUS: CPT | Performed by: EMERGENCY MEDICINE

## 2024-10-20 PROCEDURE — 84484 ASSAY OF TROPONIN QUANT: CPT | Performed by: EMERGENCY MEDICINE

## 2024-10-20 PROCEDURE — 70450 CT HEAD/BRAIN W/O DYE: CPT | Performed by: EMERGENCY MEDICINE

## 2024-10-20 PROCEDURE — 96360 HYDRATION IV INFUSION INIT: CPT

## 2024-10-20 PROCEDURE — 99285 EMERGENCY DEPT VISIT HI MDM: CPT

## 2024-10-20 PROCEDURE — 93010 ELECTROCARDIOGRAM REPORT: CPT

## 2024-10-20 RX ORDER — CEPHALEXIN 500 MG/1
500 CAPSULE ORAL 3 TIMES DAILY
Qty: 15 CAPSULE | Refills: 0 | Status: SHIPPED | OUTPATIENT
Start: 2024-10-20 | End: 2024-10-25

## 2024-10-20 NOTE — ED QUICK NOTES
Md at bedside and reevaluating pt. Daughter informed of pt's test report and plan of care. Verbalizing understanding

## 2024-10-20 NOTE — ED QUICK NOTES
Xray done and completed. Pt's skin tear dressed by dr. Box with steri strips, bandaged after with 4x4 and kerlix. No active bleeding noted

## 2024-10-20 NOTE — ED INITIAL ASSESSMENT (HPI)
Pt arrives with medics from facility after an unwitnessed fall in the bathroom. On thinners. Unknown LOC.

## 2024-10-20 NOTE — ED QUICK NOTES
Pt's daughter states she will take her mother home and with approval from dr. Box. Pt prepared for discharge. Report given to Gissel haile

## 2024-10-20 NOTE — ED PROVIDER NOTES
Patient Seen in: [facility] Emergency Department      History     Chief Complaint   Patient presents with    Trauma     Stated Complaint: fall, on thinners, possible LOC    Subjective:   HPI      74-year-old patient with a history of hypertension depression high blood pressure diabetes vascular dementia presents with hemiplegia from a stroke presents to the emergency department after a fall that was unwitnessed at the nursing facility where she resides.  Medics report that there was stool in the toilet and stool on the patient's hand and that she was found on the floor so perhaps she had gotten up to use the restroom and was heading back to the bed.  She states that she lost consciousness but she is not a reliable historian nursing facility is unable to tell us if she has possibly lost consciousness    Objective:     Past Medical History:    Anxiety state    Depression    Essential hypertension    High blood pressure    Type 1 diabetes mellitus (HCC)    Vascular dementia (HCC)              History reviewed. No pertinent surgical history.             Social History     Socioeconomic History    Marital status:    Tobacco Use    Smoking status: Former     Types: Cigarettes    Smokeless tobacco: Never   Vaping Use    Vaping status: Never Used   Substance and Sexual Activity    Alcohol use: Never    Drug use: Never   Other Topics Concern    Caffeine Concern Yes     Comment: daily    Exercise No     Comment: inconsistent     Social Drivers of Health     Food Insecurity: No Food Insecurity (5/18/2024)    Food Insecurity     Food Insecurity: Never true   Transportation Needs: No Transportation Needs (5/18/2024)    Transportation Needs     Lack of Transportation: No    [facility]    Social Connections   Housing Stability: Low Risk  (5/18/2024)    Housing Stability     Housing Instability: No                  Physical Exam     ED Triage Vitals    BP 10/20/24 0715 (!) 192/69   Pulse 10/20/24 0554 75   Resp 10/20/24 0554 18   Temp 10/20/24 0744 97.7 °F (36.5 °C)   Temp src 10/20/24 0744 Temporal   SpO2 10/20/24 0554 98 %   O2 Device 10/20/24 0554 None (Room air)       Current Vitals:   Vital Signs  BP: 132/60  Pulse: 83  Resp: 23  Temp: 97.7 °F (36.5 °C)  Temp src: Temporal  MAP (mmHg): 77    Oxygen Therapy  SpO2: 96 %  O2 Device: None (Room air)        Physical Exam  Vitals and nursing note reviewed. Exam conducted with a chaperone present.   Constitutional:       General: She is not in acute distress.     Appearance: She is not ill-appearing or toxic-appearing.   HENT:      Right Ear: Tympanic membrane normal. There is no impacted cerumen.      Left Ear: Tympanic membrane normal. There is no impacted cerumen.      Nose: Nose normal.      Mouth/Throat:      Mouth: Mucous membranes are dry.   Eyes:      General: No scleral icterus.        Right eye: No discharge.         Left eye: No discharge.      Extraocular Movements: Extraocular movements intact.      Conjunctiva/sclera: Conjunctivae normal.      Pupils: Pupils are equal, round, and reactive to light.   Neck:      Vascular: No carotid bruit.   Cardiovascular:      Rate and Rhythm: Normal rate and regular rhythm.      Pulses: Normal pulses.      Heart sounds: Normal heart sounds. No murmur heard.     No friction rub. No gallop.   Pulmonary:      Effort: Pulmonary effort is normal. No respiratory distress.      Breath sounds: Normal breath sounds. No stridor. No wheezing, rhonchi or rales.   Chest:      Chest wall: No tenderness.   Abdominal:      General: Abdomen is flat. Bowel sounds are normal.      Palpations: Abdomen is soft.      Tenderness: There is no abdominal tenderness.   Musculoskeletal:         General: Swelling, tenderness and signs of injury present.      Cervical back: Normal range of motion and neck supple. No rigidity or tenderness.      Comments: Bruising and erythema of the left upper  extremity with a large skin tear there.   Lymphadenopathy:      Cervical: No cervical adenopathy.   Skin:     Capillary Refill: Capillary refill takes less than 2 seconds.      Findings: Bruising and erythema present.   Neurological:      Mental Status: She is alert.      Comments: Patient with dementia but at baseline             ED Course     Labs Reviewed   CBC WITH DIFFERENTIAL WITH PLATELET - Abnormal; Notable for the following components:       Result Value    Neutrophil Absolute Prelim 8.64 (*)     Neutrophil Absolute 8.64 (*)     All other components within normal limits   COMP METABOLIC PANEL (14) - Abnormal; Notable for the following components:    Glucose 172 (*)     BUN 24 (*)     Calculated Osmolality 302 (*)     All other components within normal limits   URINALYSIS WITH CULTURE REFLEX - Abnormal; Notable for the following components:    Urine Color Colorless (*)     Glucose Urine 100 (*)     All other components within normal limits   POCT GLUCOSE - Abnormal; Notable for the following components:    POC Glucose 153 (*)     All other components within normal limits   TROPONIN I HIGH SENSITIVITY - Normal   RAPID SARS-COV-2 BY PCR - Normal   RAINBOW DRAW BLUE   RAINBOW DRAW GOLD     EKG    Rate, intervals and axes as noted on EKG Report.  Rate: 74  Rhythm: Sinus Rhythm  Reading: Sinus rhythm with occasional PVCs 74 bpm no acute ST elevation or significant ST depression to suggest acute ischemia it is a benign EKG    Patient arrived in a cervical collar it was cleared by myself after CT scan and careful evaluation of the patient cervical spine       ED Course as of 10/20/24 1323  ------------------------------------------------------------  Time: 10/20 0811  Comment: On reevaluation, patient would like some pancakes and sausage for breakfast she is feeling well her cervical collar was cleared.  She states that she is mostly in a wheelchair but it sounds like she does transfer.  I am not sure about the  story about her going to the restroom and having a bowel movement on her own it does not seem that this patient can walk  ------------------------------------------------------------  Time: 10/20 0828  Comment: Apparently when [facility] called and spoke with the administrative staff/tech here they told her that the patient has a UTI and they are just waiting on the cultures so it sounds like they are not currently being treated for that  ------------------------------------------------------------  Time: 10/20 0854  Comment: I called and spoke with the patient's daughter who is on her way to the emergency department.  I am going to have her help me decide where the patient is at her functional baseline she lives at [facility] but [facility] is an assisted living facility patient is hungry she is requesting pancakes and sausage for breakfast but it sounds like at her visit on my independent review of the chart with Dr. Marino she was in a wheelchair who with a great deal of assistance can get up with a walker occasionally but mostly wheelchair-bound  ------------------------------------------------------------  Time: 10/20 1311  Comment: Skin was carefully tacked down with Mastisol and Steri-Strips for the large skin tear on the left humerus it was all fully bruised and swollen so an x-ray was done, the x-ray does not show any acute fracture.   CT of the brain as independently interpreted by myself does not show signs of intracranial hemorrhage patient does have significant sequela from a right MCA previous stroke    XR HUMERUS (MIN 2 VIEWS), LEFT (CPT=73060)   Final Result   PROCEDURE:  XR HUMERUS (MIN 2 VIEWS), LEFT (CPT=73060)       INDICATIONS:  left arm bruised and skin tear- but on thinners       COMPARISON:  None.       PATIENT STATED HISTORY: (As transcribed by Technologist)  Patient family    says patient fell down this morning. Patient is on blood thinners and    there is a deep cut and bleeding in  mid humerus.             FINDINGS:     There is mild diffuse osseous demineralization.  No acute osseous    injury/fractures are noted.  There is mild left AC joint osteoarthritis.     The glenohumeral joint and elbow appear within normal limits.  No soft    tissue swelling.                         =====   CONCLUSION:     No acute process noted.  There is osseous demineralization suspicious for    osteopenia versus osteoporosis.           LOCATION:  [facility]           Dictated by (CST): Tamanna Puga DO on 10/20/2024 at 11:32 AM        Finalized by (CST): Tamanna Puga DO on 10/20/2024 at 11:32 AM         CT SPINE CERVICAL (CPT=72125)   Final Result   PROCEDURE:  CT SPINE CERVICAL (CPT=72125)       COMPARISON:  None.       INDICATIONS:  fall, on thinners, possible LOC       TECHNIQUE:  Noncontrast CT scanning of the cervical spine is performed    from the skull base through C7.  Multiplanar reconstructions are    generated.  Dose reduction techniques were used. Dose information is    transmitted to the ACR (American College of    Radiology) NRDR (National Radiology Data Registry) which includes the Dose    Index Registry.       PATIENT STATED HISTORY: (As transcribed by Technologist)  Unwitnessed    fall. Patient unable to provide history            FINDINGS:     The cervical spine shows no evidence of fracture.  The cervical vertebral    alignment demonstrates minimal anterolisthesis of C7 on T1.  Ligamentous    injury cannot be excluded on CT scan. There are normal cervical basilar    relationships. The odontoid process    is intact. No destructive osseous lesion is identified. The pre vertebral    and thais vertebral soft tissues are normal.  Multilevel degenerative    changes.  Area of sclerosis within the left facet of C5.                         =====   CONCLUSION:     1. No acute fractures.   2. Multilevel degenerative changes.     3. Nonspecific area of sclerosis within the left facet of C5.                  LOCATION:  [id number]           Dictated by (CST): Haley Blair MD on 10/20/2024 at 7:16 AM        Finalized by (CST): Haley Blair MD on 10/20/2024 at 7:18 AM         CT BRAIN OR HEAD (CPT=70450)   Final Result   PROCEDURE:  CT BRAIN OR HEAD (88837)       COMPARISON:  [facility] , CT, CT BRAIN OR HEAD (34607), 8/16/2024, 9:07 PM.     [facility] , CT, CT BRAIN OR HEAD ([id number]), 1/27/2024, 10:16 PM.       INDICATIONS:  fall, on thinners, possible LOC       TECHNIQUE:  Noncontrast CT scanning is performed through the brain. Dose    reduction techniques were used. Dose information is transmitted to the ACR    (American College of Radiology) NRDR (National Radiology Data Registry)    which includes the Dose Index    Registry.       PATIENT STATED HISTORY: (As transcribed by Technologist)  Unwitnessed    fall. Patient unable to provide history            FINDINGS:     There is cerebral atrophy with symmetric prominence of the ventricles.    There are patchy areas of low attenuation in the periventricular and deep    white matter which are nonspecific but most consistent with small vessel    ischemic changes. There is no    evidence of hemorrhage, mass, midline shift, or extra-axial fluid    collection.  Stable dense calcifications noted within the right MCA.     Right-sided infarct.       The visualized paranasal sinuses show no significant sinus disease.. No    evidence of depressed skull fracture.                         =====   CONCLUSION:   1. No acute intracranial findings   2. Cerebral atrophy with chronic microvascular ischemic changes with old    right MCA distribution infarct.     3. Stable dense calcifications within the right middle cerebral artery    with probable stenosis    4. If clinical suspicion for acute infarct is high, consider MRI.               LOCATION:  [id number]           Dictated by (CST): Haley Blair MD on 10/20/2024 at 7:13 AM        Finalized by (CST): Haley Blair MD on 10/20/2024 at  7:16 AM                  MDM      74-year-old patient presents to the emergency department after a fall at the nursing facility where she lives.  Differential diagnosis includes intracranial hemorrhage, cervical spinal injury, mechanical fall, extremity fracture, change in mental status    Patient does have a history of vascular dementia.  Once her daughter arrived we were able to understand that she really is not significantly changed from her baseline.  X-rays done of the left humerus to make sure no bony injury, hips carefully assessed.  Does not seem to have traumatic injuries.  Daughter states that sometimes patient cannot wait for help going to the bathroom and does attempt to get their themselves that may possibly have happened here.    Skin tear carefully repaired no signs of intracranial hemorrhage no signs of acute stroke no signs of urinary tract infection at all actually that was from a catheterized urine specimen    Patient to be discharged home in stable condition.  Questions answered emergent conditions evaluated and ruled out        Medical Decision Making      Disposition and Plan     Clinical Impression:  1. Fall, initial encounter    2. Abnormality of gait    3. Skin tear of left upper arm without complication, initial encounter         Disposition:  Discharge  10/20/2024 10:02 am    Follow-up:  Macey Ramos MD  [address]  [address] [address]  [phone]    Follow up  As needed          Medications Prescribed:  Discharge Medication List as of 10/20/2024 12:11 PM        START taking these medications    Details   cephALEXin 500 MG Oral Cap Take 1 capsule (500 mg total) by mouth 3 (three) times daily for 5 days. For skin tear, Normal, Disp-15 capsule, R-0                 Supplementary Documentation:

## 2024-10-20 NOTE — ED QUICK NOTES
2 attempts made to call [facility] for more clarification and information about the pt. No answer.

## 2024-10-27 ENCOUNTER — HOSPITAL ENCOUNTER (EMERGENCY)
Facility: HOSPITAL | Age: 74
Discharge: HOME | End: 2024-10-27
Attending: EMERGENCY MEDICINE
Payer: MEDICARE

## 2024-10-27 VITALS
HEART RATE: 72 BPM | BODY MASS INDEX: 19 KG/M2 | OXYGEN SATURATION: 100 % | RESPIRATION RATE: 20 BRPM | HEIGHT: 66 IN | SYSTOLIC BLOOD PRESSURE: 162 MMHG | TEMPERATURE: 97 F | DIASTOLIC BLOOD PRESSURE: 64 MMHG

## 2024-10-27 DIAGNOSIS — F03.911 DEMENTIA WITH AGITATION, UNSPECIFIED DEMENTIA SEVERITY, UNSPECIFIED DEMENTIA TYPE (HCC): Primary | ICD-10-CM

## 2024-10-27 LAB
ALBUMIN SERPL-MCNC: 4.3 G/DL (ref 3.2–4.8)
ALBUMIN/GLOB SERPL: 1.7 {RATIO} (ref 1–2)
ALP LIVER SERPL-CCNC: 94 U/L
ALT SERPL-CCNC: 12 U/L
ANION GAP SERPL CALC-SCNC: 0 MMOL/L (ref 0–18)
AST SERPL-CCNC: 15 U/L (ref ?–34)
BASOPHILS # BLD AUTO: 0.05 X10(3) UL (ref 0–0.2)
BASOPHILS NFR BLD AUTO: 0.6 %
BILIRUB SERPL-MCNC: 1.3 MG/DL (ref 0.2–1.1)
BILIRUB UR QL STRIP.AUTO: NEGATIVE
BUN BLD-MCNC: 19 MG/DL (ref 9–23)
CALCIUM BLD-MCNC: 10 MG/DL (ref 8.7–10.4)
CHLORIDE SERPL-SCNC: 106 MMOL/L (ref 98–112)
CLARITY UR REFRACT.AUTO: CLEAR
CO2 SERPL-SCNC: 31 MMOL/L (ref 21–32)
COLOR UR AUTO: COLORLESS
CREAT BLD-MCNC: 0.87 MG/DL
EGFRCR SERPLBLD CKD-EPI 2021: 70 ML/MIN/1.73M2 (ref 60–?)
EOSINOPHIL # BLD AUTO: 0.24 X10(3) UL (ref 0–0.7)
EOSINOPHIL NFR BLD AUTO: 2.7 %
ERYTHROCYTE [DISTWIDTH] IN BLOOD BY AUTOMATED COUNT: 12.6 %
GLOBULIN PLAS-MCNC: 2.6 G/DL (ref 2–3.5)
GLUCOSE BLD-MCNC: 247 MG/DL (ref 70–99)
GLUCOSE UR STRIP.AUTO-MCNC: 200 MG/DL
HCT VFR BLD AUTO: 36.2 %
HGB BLD-MCNC: 12.4 G/DL
IMM GRANULOCYTES # BLD AUTO: 0.02 X10(3) UL (ref 0–1)
IMM GRANULOCYTES NFR BLD: 0.2 %
KETONES UR STRIP.AUTO-MCNC: NEGATIVE MG/DL
LEUKOCYTE ESTERASE UR QL STRIP.AUTO: NEGATIVE
LYMPHOCYTES # BLD AUTO: 1.13 X10(3) UL (ref 1–4)
LYMPHOCYTES NFR BLD AUTO: 12.9 %
MCH RBC QN AUTO: 30.8 PG (ref 26–34)
MCHC RBC AUTO-ENTMCNC: 34.3 G/DL (ref 31–37)
MCV RBC AUTO: 90 FL
MONOCYTES # BLD AUTO: 0.55 X10(3) UL (ref 0.1–1)
MONOCYTES NFR BLD AUTO: 6.3 %
NEUTROPHILS # BLD AUTO: 6.76 X10 (3) UL (ref 1.5–7.7)
NEUTROPHILS # BLD AUTO: 6.76 X10(3) UL (ref 1.5–7.7)
NEUTROPHILS NFR BLD AUTO: 77.3 %
NITRITE UR QL STRIP.AUTO: NEGATIVE
OSMOLALITY SERPL CALC.SUM OF ELEC: 295 MOSM/KG (ref 275–295)
PH UR STRIP.AUTO: 7.5 [PH] (ref 5–8)
PLATELET # BLD AUTO: 320 10(3)UL (ref 150–450)
POTASSIUM SERPL-SCNC: 4.3 MMOL/L (ref 3.5–5.1)
PROT SERPL-MCNC: 6.9 G/DL (ref 5.7–8.2)
PROT UR STRIP.AUTO-MCNC: NEGATIVE MG/DL
RBC # BLD AUTO: 4.02 X10(6)UL
SODIUM SERPL-SCNC: 137 MMOL/L (ref 136–145)
SP GR UR STRIP.AUTO: 1.01 (ref 1–1.03)
UROBILINOGEN UR STRIP.AUTO-MCNC: NORMAL MG/DL
WBC # BLD AUTO: 8.8 X10(3) UL (ref 4–11)

## 2024-10-27 PROCEDURE — 99284 EMERGENCY DEPT VISIT MOD MDM: CPT

## 2024-10-27 PROCEDURE — 36415 COLL VENOUS BLD VENIPUNCTURE: CPT

## 2024-10-27 PROCEDURE — 81001 URINALYSIS AUTO W/SCOPE: CPT | Performed by: EMERGENCY MEDICINE

## 2024-10-27 PROCEDURE — 85025 COMPLETE CBC W/AUTO DIFF WBC: CPT | Performed by: EMERGENCY MEDICINE

## 2024-10-27 PROCEDURE — 80053 COMPREHEN METABOLIC PANEL: CPT | Performed by: EMERGENCY MEDICINE

## 2024-10-27 PROCEDURE — 99283 EMERGENCY DEPT VISIT LOW MDM: CPT

## 2024-10-27 NOTE — ED QUICK NOTES
pt arrives with a bandage and bruising to her left upper arm, pt unable to recall what happened to her arm.

## 2024-10-27 NOTE — ED QUICK NOTES
This RN called pt daughter who verified she is on her way to he ED, pt daughter also verified the bruising/skin tear to the pt left upper arm is from a fall the pt sustained and was treated for in the ED for last week.   Pt informed that her daughter is on her way to the ED.

## 2024-10-27 NOTE — ED QUICK NOTES
Report called to [facility], spoke with RANDALL Aguilera    Pt daughter will drive the pt back after she has eaten.

## 2024-10-27 NOTE — ED INITIAL ASSESSMENT (HPI)
Patient arrives with naper for c/o \"mood swings\" per NH staff. Patient with no c/o, per hx depressive disorder and uti. Patient with no complaints. Per NH patient acting out and laughing and yelling at NH. Patient arrives calm and cooperative.

## 2024-10-27 NOTE — ED PROVIDER NOTES
Patient Seen in: [facility] Emergency Department      History     Chief Complaint   Patient presents with    Other     Stated Complaint: \"mood swings\" from [facility]    Subjective:   HPI      Patient is a 74-year-old female with a history of dementia sent from her nursing facility for evaluation of \"mood swings\" this morning.  Nurse told the paramedics that first the patient was laughing and then she was being mean to the nurse.  So she was sent to the ER.  Per paramedics patient was calm and cooperative during transport.  On arrival here the patient is calm, she is a limited historian due to her dementia but she has no complaints right now.    Objective:     No pertinent past medical history.            No pertinent past surgical history.              No pertinent social history.                Physical Exam     ED Triage Vitals [10/27/24 0650]   /69   Pulse 63   Resp 17   Temp 97.4 °F (36.3 °C)   Temp src Temporal   SpO2 96 %   O2 Device None (Room air)       Current Vitals:   Vital Signs  BP: (!) 162/64  Pulse: 72  Resp: 20  Temp: 97.4 °F (36.3 °C)  Temp src: Temporal  MAP (mmHg): 94    Oxygen Therapy  SpO2: 100 %  O2 Device: None (Room air)        Physical Exam  Vitals and nursing note reviewed.   Constitutional:       Appearance: She is well-developed.   HENT:      Head: Normocephalic and atraumatic.   Eyes:      Conjunctiva/sclera: Conjunctivae normal.      Pupils: Pupils are equal, round, and reactive to light.   Cardiovascular:      Rate and Rhythm: Normal rate and regular rhythm.      Heart sounds: Normal heart sounds.   Pulmonary:      Effort: Pulmonary effort is normal.      Breath sounds: Normal breath sounds.   Abdominal:      General: Bowel sounds are normal.      Palpations: Abdomen is soft.   Musculoskeletal:         General: Normal range of motion.      Cervical back: Normal range of motion and neck supple.   Skin:     General: Skin is warm and dry.   Neurological:      Mental  Status: She is alert.      Comments: Oriented to person but not place or time.  Calm and cooperative.  Limited historian.             ED Course     Labs Reviewed   COMP METABOLIC PANEL (14) - Abnormal; Notable for the following components:       Result Value    Glucose 247 (*)     Bilirubin, Total 1.3 (*)     All other components within normal limits   URINALYSIS WITH CULTURE REFLEX - Abnormal; Notable for the following components:    Urine Color Colorless (*)     Glucose Urine 200 (*)     Blood Urine 2+ (*)     Squamous Epi. Cells Few (*)     All other components within normal limits   CBC WITH DIFFERENTIAL WITH PLATELET                   MDM      74-year-old female with a history of dementia presenting by ambulance from [facility] for evaluation of what was described as mood swings this morning.  She is calm and cooperative here, limited historian but has no specific complaints right now.  She denies any pain.  She does need to be ruled out for infection so we will check labs as well as a cath UA to evaluate for UTI.      Update at 9:35 AM.  Labs unremarkable.  Mild hyperglycemia but no evidence of acidosis or DKA.  No leukocytosis.  Urinalysis is unremarkable.  Patient has remained calm and cooperative here with no outbursts or agitation of any kind.  I do not think she needs to be emergently evaluated by psychiatry here.        Past Medical History-dementia, diabetes, hypertension    Differential diagnosis before testing included infection, dehydration    Co-morbidities that add to the complexity of management include: None    Testing ordered during this visit included labs, UA          Disposition:        Discharge  I have discussed with the patient the results of test, differential diagnosis, treatment plan, warning signs and symptoms which should prompt immediate return.  They expressed understanding of these instructions and agrees to the following plan provided.  They were given written discharge  instructions and agrees to return for any concerns and voiced understanding and all questions were answered.      Medical Decision Making      Disposition and Plan     Clinical Impression:  1. Dementia with agitation, unspecified dementia severity, unspecified dementia type (HCC)         Disposition:  Discharge  10/27/2024  9:37 am    Follow-up:  Macey Ramos MD  [address]  [facility] [address]  [phone]    Follow up            Medications Prescribed:  Current Discharge Medication List              Supplementary Documentation:

## 2024-10-29 ENCOUNTER — NURSE ONLY (OUTPATIENT)
Dept: ELECTROPHYSIOLOGY | Facility: HOSPITAL | Age: 74
End: 2024-10-29
Attending: Other
Payer: MEDICARE

## 2024-10-29 ENCOUNTER — HOSPITAL ENCOUNTER (OUTPATIENT)
Dept: ULTRASOUND IMAGING | Facility: HOSPITAL | Age: 74
Discharge: HOME | End: 2024-10-29
Attending: Other
Payer: MEDICARE

## 2024-10-29 DIAGNOSIS — R41.0 TRANSIENT CONFUSION: ICD-10-CM

## 2024-10-29 DIAGNOSIS — Z86.73 HISTORY OF STROKE: ICD-10-CM

## 2024-10-29 DIAGNOSIS — F01.511 VASCULAR DEMENTIA WITH AGITATION, UNSPECIFIED DEMENTIA SEVERITY (HCC): ICD-10-CM

## 2024-10-29 PROCEDURE — 95816 EEG AWAKE AND DROWSY: CPT

## 2024-10-29 PROCEDURE — 93880 EXTRACRANIAL BILAT STUDY: CPT | Performed by: OTHER

## 2024-10-31 DIAGNOSIS — E10.65 UNCONTROLLED TYPE 1 DIABETES MELLITUS WITH HYPERGLYCEMIA (HCC): ICD-10-CM

## 2024-10-31 NOTE — TELEPHONE ENCOUNTER
Nurse Amanda from [facility] requesting to get a refill for Tresiba long acting insulin and Short actiing Insulin Novalog. BD Auto Duo Shield Safety needles, Safety Lancets for the Free Style Dorian II.     Current Outpatient Medications   Medication Sig Dispense Refill                                                                                               insulin aspart (NOVOLOG FLEXPEN) 100 Units/mL Subcutaneous Solution Pen-injector Inject 3 times daily with meals in accordance to sliding scale. Max daily dose 27 units. Breakfast:120-150 6 units, 151-199 6 units, 200-249 8 units, 250-300 10 units, 300-400 12units. Lunch 120-150 2 units,151-199 3 units, 200-249 5 units, 250-299 6 units, 300-349 7 units, 350-400 8 units. Dinner 120-150 4 units, 151-199 5 units, 200-249 6 units, 250-299 8 units, 300-349 10 units, 350-450 12 units. 27 mL 0    insulin degludec 100 units/mL Subcutaneous Solution Pen-injector Inject 10 units in the morning and 2 units at bedtime daily. 12 mL 1    Insulin Aspart, w/Niacinamide, (FIASP FLEXTOUCH) 100 UNIT/ML Subcutaneous Solution Pen-injector Inject 3 times daily with meals in accordance to sliding scale. Max daily dose 27 units. 27 mL 0                  Insulin Pen Needle (BD AUTOSHIELD DUO) 30G X 5 MM Does not apply Misc Pt injects insulin 5 times daily 500 each 1    glucagon (GVOKE HYPOPEN 2-PACK) 1 MG/0.2ML Subcutaneous SUBQ injection Inject 0.2 mL (1 mg total) into the skin.

## 2024-11-01 RX ORDER — INSULIN ASPART 100 [IU]/ML
INJECTION, SOLUTION INTRAVENOUS; SUBCUTANEOUS
Qty: 27 ML | Refills: 1 | Status: SHIPPED | OUTPATIENT
Start: 2024-11-01

## 2024-11-01 RX ORDER — GLUCAGON INJECTION, SOLUTION 1 MG/.2ML
1 INJECTION, SOLUTION SUBCUTANEOUS ONCE AS NEEDED
Qty: 1 EACH | Refills: 0 | Status: SHIPPED | OUTPATIENT
Start: 2024-11-01 | End: 2024-11-01

## 2024-11-01 RX ORDER — INSULIN ASPART INJECTION 100 [IU]/ML
INJECTION, SOLUTION SUBCUTANEOUS
Qty: 27 ML | Refills: 1 | Status: SHIPPED | OUTPATIENT
Start: 2024-11-01

## 2024-11-01 RX ORDER — PEN NEEDLE, DIABETIC, SAFETY 30 GX3/16"
NEEDLE, DISPOSABLE MISCELLANEOUS
Qty: 500 EACH | Refills: 1 | Status: SHIPPED | OUTPATIENT
Start: 2024-11-01

## 2024-11-01 RX ORDER — INSULIN DEGLUDEC 100 U/ML
INJECTION, SOLUTION SUBCUTANEOUS
Qty: 12 ML | Refills: 1 | Status: SHIPPED | OUTPATIENT
Start: 2024-11-01

## 2024-11-01 NOTE — TELEPHONE ENCOUNTER
Endocrine refill protocol for rapid acting, regular, intermediate, and mixed insulin:    Protocol Criteria:  FAILED Reason: Elevated A1C    If all below requirements are met, send a 90-day supply with 1 refill per provider protocol.    Verify appointment with Endocrinology completed in the last 6 months or scheduled in the next 3 months.  Verify A1C has been completed within the last 6 months and is below 8.5%    -May substitute prescriptions for Novolog and Humalog unless documented allergy (pens and vials) at the same dose and concentration per insurance preference and provider protocol.   -May substitute prescriptions for Novolin R and Humulin R unless documented allergy (pens and vials) at the same dose and concentration per insurance preference and provider protocol.   -May substitute prescriptions for Novolin N and Humulin N unless documented allergy (pens and vials) at the same dose and concentration per insurance preference and provider protocol.   -May substitute prescriptions for Humulin and Novolin 70/30 insulin unless documented allergy at the same dose and concentration per insurance preference and provider protocol.    Last completed office visit: 1/8/2024 Melly Raza MD   Next scheduled Follow up:   Future Appointments   Date Time Provider Department Center   11/27/2024  3:00 PM  MR RM1 (1.5T WIDE)  [facility]   12/12/2024  4:30 PM Melly Raza MD [facility]      Last A1C result: 9.9% done 7/11/2024.

## 2024-11-01 NOTE — TELEPHONE ENCOUNTER
Endocrine refill protocol for basal insulins     Protocol Criteria: FAILED Reason: Elevated A1C    If all below requirements are met, send a 90-day supply with 1 refill per provider protocol.       Verify Appointment with Endocrinology completed in the last 6 months or scheduled in the next 3 months.  Verify A1C has been completed within the last 6 months and is below 8.5%     Last completed office visit:1/8/2024 Melly Raza MD   Next scheduled Follow up:   Future Appointments   Date Time Provider Department Center   11/27/2024  3:00 PM  MR RM1 (1.5T WIDE) [facility]   12/12/2024  4:30 PM Melly Raza MD [facility]      Last A1c result: Last A1c value was 9.9% done 7/11/2024.     90 day supply pending

## 2024-11-04 NOTE — PROCEDURES
[facility] - ELECTROENCEPHALOGRAM (EEG) REPORT  Patient Name:  Maite Cavazos   MRN / CSN:  [id number] / [id number]   Date of Birth / Age:  4/18/1950 /  74 year old   Encounter Date:  10/29/24         METHODS:  Twenty-two electrodes were applied according to the 10-20-electrode placement system [facility]. EKG monitoring, monopolar and bipolar montages are routinely utilized. The record was obtained on a digital system.      OBJECT:  This is a 74 year old year-old female with a PMH of dementia, previous strokes,  and  who presents with episodes of confusion.    The EEG was requested to assess for epileptiform activity and change in mental status.     State(s) of consciousness: awake    Relevant medications:     Current Outpatient Medications:     Continuous Glucose Sensor (FREESTYLE SREEKANTH 2 SENSOR) Does not apply Misc, 1 each every 14 (fourteen) days. Dx:E10.9, Disp: 6 each, Rfl: 1    insulin aspart (NOVOLOG FLEXPEN) 100 Units/mL Subcutaneous Solution Pen-injector, Inject 3 times daily with meals in accordance to sliding scale. Max daily dose 27 units. Breakfast:120-150 6 units, 151-199 6 units, 200-249 8 units, 250-300 10 units, 300-400 12units. Lunch 120-150 2 units,151-199 3 units, 200-249 5 units, 250-299 6 units, 300-349 7 units, 350-400 8 units. Dinner 120-150 4 units, 151-199 5 units, 200-249 6 units, 250-299 8 units, 300-349 10 units, 350-450 12 units., Disp: 27 mL, Rfl: 1    Insulin Pen Needle (BD AUTOSHIELD DUO) 30G X 5 MM Does not apply Misc, Pt injects insulin 5 times daily, Disp: 500 each, Rfl: 1    Insulin Aspart, w/Niacinamide, (FIASP FLEXTOUCH) 100 UNIT/ML Subcutaneous Solution Pen-injector, Inject 3 times daily with meals in accordance to sliding scale. Max daily dose 27 units., Disp: 27 mL, Rfl: 1    insulin degludec 100 units/mL Subcutaneous Solution Pen-injector, Inject 10 units in the morning and 2 units at bedtime daily., Disp: 12 mL, Rfl: 1    escitalopram (LEXAPRO) 20 MG Oral  Tab, Take 1 tablet (20 mg total) by mouth every morning., Disp: 30 tablet, Rfl: 11    albuterol 108 (90 Base) MCG/ACT Inhalation Aero Soln, Inhale 1 puff into the lungs every 4 (four) hours as needed., Disp: , Rfl:     atorvastatin 40 MG Oral Tab, TAKE 1 TABLET BY MOUTH DAILY for 90, Disp: , Rfl:     cetirizine 10 MG Oral Tab, Take 1 tablet (10 mg total) by mouth daily., Disp: , Rfl:     CIPRO 500 MG Oral Tab, 1 tablet Orally every 12 hrs for 5 days, Disp: , Rfl:     diphenhydrAMINE-APAP, sleep,  MG Oral Tab, Take 1 tablet by mouth As Directed., Disp: , Rfl:     docusate sodium 100 MG Oral Cap, 1 capsule as needed twice a day as needed, Disp: , Rfl:     ergocalciferol 1.25 MG (52466 UT) Oral Cap, 1 capsule (50,000 Units total)., Disp: , Rfl:     HYDROcodone-acetaminophen 5-325 MG Oral Tab, 1 tablet as needed Q6H PRN, Disp: , Rfl:     Loperamide HCl (IMODIUM A-D) 2 MG Oral Tab, 1 tablet after each loose stool as needed Orally Four times a day for 30 days, Disp: , Rfl:     Nutritional Supplements (GLUCERNA SHAKE) Oral Liquid, as directed Orally, Disp: , Rfl:     saccharomyces boulardii 250 MG Oral Cap, 1 capsule (250 mg total)., Disp: , Rfl:     BREO ELLIPTA 100-25 MCG/ACT Inhalation Aerosol Powder, Breath Activated, Inhale 1 puff into the lungs daily., Disp: , Rfl:     oxybutynin 5 MG Oral Tab, Take 1 tablet (5 mg total) by mouth daily., Disp: , Rfl:     amLODIPine 2.5 MG Oral Tab, Take 1 tablet (2.5 mg total) by mouth daily., Disp: , Rfl:     clopidogrel 75 MG Oral Tab, Take 1 tablet (75 mg total) by mouth daily., Disp: , Rfl:     losartan 50 MG Oral Tab, Take 1 tablet (50 mg total) by mouth daily., Disp: , Rfl:     cholecalciferol 50 MCG (2000 UT) Oral Cap, Take 1 capsule (2,000 Units total) by mouth daily. 1 tablet everyday, Disp: , Rfl:     Ca Phosphate-Cholecalciferol (CALTRATE GUMMY BITES OR), Take by mouth., Disp: , Rfl:     glucose (GLUTOSE 15) 40% Oral Gel, Take 37.5 mL (15 g total) by mouth once.,  Disp: , Rfl:     aspirin 81 MG Oral Tab EC, Take 1 tablet (81 mg total) by mouth daily., Disp: , Rfl:       FINDINGS:  1) Background: A posterior-dominant background rhythm of 6-7 Hz with an amplitude of 15-30 microvolts is seen.  2) Sleep: No sleep complexes were noted.  3) Abnormalities:   - Background slowing as noted above  4) Activation:                    HV: Not performed.                    IPS: Not performed.    IMPRESSION:  This EEG is an abnormal study recorded in the awake states showing background slowing into the theta rrange (6-7Hz) which is consistent with a moderate encephalopathy. No focal, lateralizing, or epileptiform activity is seen and no seizures are recorded.     Report covers  Start 10/31/24 at 0921  End 10/31/24 at 0954    SIGNATURES:  Daron Marino D.O.  [facility]

## 2024-11-07 ENCOUNTER — TELEPHONE (OUTPATIENT)
Dept: ENDOCRINOLOGY CLINIC | Facility: CLINIC | Age: 74
End: 2024-11-07

## 2024-11-07 DIAGNOSIS — E10.65 UNCONTROLLED TYPE 1 DIABETES MELLITUS WITH HYPERGLYCEMIA (HCC): Primary | ICD-10-CM

## 2024-11-07 NOTE — TELEPHONE ENCOUNTER
Maggie Headley states patient needs a new diabetic reader it is urgent please follow up [id number] - [place], [place] - [address], [phone], [phone]

## 2024-11-07 NOTE — TELEPHONE ENCOUNTER
Spoke to Maggie PEREIRA. States dorian 2 reader isn't working and needs a new one. Pharmacy won't give replacement. requesting RX be sent to [facility] on file. RN asked if they've contacted [facility] customer service and states they don't have time for that. RX sent.     Endocrine Refill protocol for CGM supplies   Dorian 2 reader   Protocol Criteria:  PASSED     If below requirement is met, send a 90-day supply with 1 refill per provider protocol.     Verify appointment with Endocrinology completed in the last 12 months or scheduled in the next 6 months     Last completed office visit:1/8/2024 Melly Raza MD   Next scheduled Follow up: 12/12/24

## 2024-11-18 PROCEDURE — 87086 URINE CULTURE/COLONY COUNT: CPT

## 2024-11-18 PROCEDURE — 81001 URINALYSIS AUTO W/SCOPE: CPT

## 2024-11-18 PROCEDURE — 87077 CULTURE AEROBIC IDENTIFY: CPT

## 2024-11-18 PROCEDURE — 87186 SC STD MICRODIL/AGAR DIL: CPT

## 2024-11-19 ENCOUNTER — LAB REQUISITION (OUTPATIENT)
Dept: LAB | Facility: HOSPITAL | Age: 74
End: 2024-11-19
Payer: MEDICARE

## 2024-11-19 DIAGNOSIS — R69 ILLNESS, UNSPECIFIED: ICD-10-CM

## 2024-11-19 LAB
BILIRUB UR QL STRIP.AUTO: NEGATIVE
COLOR UR AUTO: COLORLESS
GLUCOSE UR STRIP.AUTO-MCNC: 500 MG/DL
KETONES UR STRIP.AUTO-MCNC: NEGATIVE MG/DL
LEUKOCYTE ESTERASE UR QL STRIP.AUTO: NEGATIVE
NITRITE UR QL STRIP.AUTO: NEGATIVE
PH UR STRIP.AUTO: 7.5 [PH] (ref 5–8)
PROT UR STRIP.AUTO-MCNC: NEGATIVE MG/DL
RBC UR QL AUTO: NEGATIVE
SP GR UR STRIP.AUTO: 1.01 (ref 1–1.03)
UROBILINOGEN UR STRIP.AUTO-MCNC: NORMAL MG/DL

## 2024-11-21 ENCOUNTER — LAB REQUISITION (OUTPATIENT)
Dept: LAB | Facility: HOSPITAL | Age: 74
End: 2024-11-21
Payer: MEDICARE

## 2024-11-21 DIAGNOSIS — F01.A11: ICD-10-CM

## 2024-11-21 LAB
ALBUMIN SERPL-MCNC: 4.5 G/DL (ref 3.2–4.8)
ALBUMIN/GLOB SERPL: 1.7 {RATIO} (ref 1–2)
ALP LIVER SERPL-CCNC: 85 U/L
ALT SERPL-CCNC: 12 U/L
ANION GAP SERPL CALC-SCNC: 6 MMOL/L (ref 0–18)
AST SERPL-CCNC: 19 U/L (ref ?–34)
BASOPHILS # BLD AUTO: 0.06 X10(3) UL (ref 0–0.2)
BASOPHILS NFR BLD AUTO: 0.8 %
BILIRUB SERPL-MCNC: 0.8 MG/DL (ref 0.2–1.1)
BUN BLD-MCNC: 15 MG/DL (ref 9–23)
CALCIUM BLD-MCNC: 10.2 MG/DL (ref 8.7–10.4)
CHLORIDE SERPL-SCNC: 105 MMOL/L (ref 98–112)
CO2 SERPL-SCNC: 28 MMOL/L (ref 21–32)
CREAT BLD-MCNC: 0.88 MG/DL
EGFRCR SERPLBLD CKD-EPI 2021: 69 ML/MIN/1.73M2 (ref 60–?)
EOSINOPHIL # BLD AUTO: 0.36 X10(3) UL (ref 0–0.7)
EOSINOPHIL NFR BLD AUTO: 4.6 %
ERYTHROCYTE [DISTWIDTH] IN BLOOD BY AUTOMATED COUNT: 12.8 %
GLOBULIN PLAS-MCNC: 2.7 G/DL (ref 2–3.5)
GLUCOSE BLD-MCNC: 208 MG/DL (ref 70–99)
HCT VFR BLD AUTO: 38.2 %
HGB BLD-MCNC: 13.1 G/DL
IMM GRANULOCYTES # BLD AUTO: 0.03 X10(3) UL (ref 0–1)
IMM GRANULOCYTES NFR BLD: 0.4 %
LYMPHOCYTES # BLD AUTO: 1.77 X10(3) UL (ref 1–4)
LYMPHOCYTES NFR BLD AUTO: 22.6 %
MCH RBC QN AUTO: 31 PG (ref 26–34)
MCHC RBC AUTO-ENTMCNC: 34.3 G/DL (ref 31–37)
MCV RBC AUTO: 90.3 FL
MONOCYTES # BLD AUTO: 0.51 X10(3) UL (ref 0.1–1)
MONOCYTES NFR BLD AUTO: 6.5 %
NEUTROPHILS # BLD AUTO: 5.1 X10 (3) UL (ref 1.5–7.7)
NEUTROPHILS # BLD AUTO: 5.1 X10(3) UL (ref 1.5–7.7)
NEUTROPHILS NFR BLD AUTO: 65.1 %
OSMOLALITY SERPL CALC.SUM OF ELEC: 295 MOSM/KG (ref 275–295)
PLATELET # BLD AUTO: 323 10(3)UL (ref 150–450)
POTASSIUM SERPL-SCNC: 4.3 MMOL/L (ref 3.5–5.1)
PROT SERPL-MCNC: 7.2 G/DL (ref 5.7–8.2)
RBC # BLD AUTO: 4.23 X10(6)UL
SODIUM SERPL-SCNC: 139 MMOL/L (ref 136–145)
WBC # BLD AUTO: 7.8 X10(3) UL (ref 4–11)

## 2024-11-21 PROCEDURE — 80053 COMPREHEN METABOLIC PANEL: CPT | Performed by: OTHER

## 2024-11-21 PROCEDURE — 85025 COMPLETE CBC W/AUTO DIFF WBC: CPT | Performed by: OTHER

## 2024-11-27 ENCOUNTER — HOSPITAL ENCOUNTER (OUTPATIENT)
Dept: MRI IMAGING | Facility: HOSPITAL | Age: 74
Discharge: HOME | End: 2024-11-27
Attending: Other
Payer: MEDICARE

## 2024-11-27 DIAGNOSIS — G45.9 TIA (TRANSIENT ISCHEMIC ATTACK): ICD-10-CM

## 2024-11-27 DIAGNOSIS — Z86.73 HISTORY OF STROKE: ICD-10-CM

## 2024-11-27 DIAGNOSIS — F01.511 VASCULAR DEMENTIA WITH AGITATION, UNSPECIFIED DEMENTIA SEVERITY (HCC): ICD-10-CM

## 2024-11-27 PROCEDURE — 70551 MRI BRAIN STEM W/O DYE: CPT | Performed by: OTHER

## 2024-11-27 PROCEDURE — 70544 MR ANGIOGRAPHY HEAD W/O DYE: CPT | Performed by: OTHER

## 2024-12-02 DIAGNOSIS — I63.519 CEREBROVASCULAR ACCIDENT (CVA) DUE TO STENOSIS OF MIDDLE CEREBRAL ARTERY, UNSPECIFIED BLOOD VESSEL LATERALITY (HCC): Primary | ICD-10-CM

## 2024-12-03 ENCOUNTER — TELEPHONE (OUTPATIENT)
Dept: NEUROLOGY | Facility: CLINIC | Age: 74
End: 2024-12-03

## 2024-12-03 NOTE — TELEPHONE ENCOUNTER
Patients daughter Blaire returned call. Advised her about the change in Medicare coverage starting in 2025- verbalized understanding.   Blaire (POA) will be on the video visit without patient present due to distance and patients dementia- patient is aware that she will be discussing test results with provider. Please advise if an in person visit is required or if this is ok.     Future Appointments   Date Time Provider Department Center   12/6/2024 12:00 PM Herbert Marino DO [id number]

## 2024-12-03 NOTE — TELEPHONE ENCOUNTER
I do not mind discussing results with her via video visit.  This must be done prior to the end of this month.  Patient should be informed that as of January 1 video visit is a telephone visit will no longer be covered by Medicare and all appointments must be in person only.

## 2024-12-03 NOTE — TELEPHONE ENCOUNTER
Called patients daughter to set up video visit this month with Dr. Marino and advise about the change in coverage next year. Lvm.. if she calls back please offer held slot for 12/6 at 12pm

## 2024-12-03 NOTE — TELEPHONE ENCOUNTER
Patient daughter would like to set a video appointments to go over some test results if its ok and would like a call , because its getting hard to get patient here with wheelchair

## 2024-12-04 NOTE — TELEPHONE ENCOUNTER
Called POA back to discuss notes below for video.    Pt must be present for video visit, POA reports she will not be able to be in same location as patient.    Rescheduled for video visit on 12/23/2024 at 11:55, POA can be present for patient at that time.

## 2024-12-12 ENCOUNTER — APPOINTMENT (OUTPATIENT)
Dept: CT IMAGING | Facility: HOSPITAL | Age: 74
End: 2024-12-12
Attending: EMERGENCY MEDICINE
Payer: MEDICARE

## 2024-12-12 PROCEDURE — 70450 CT HEAD/BRAIN W/O DYE: CPT | Performed by: EMERGENCY MEDICINE

## 2024-12-13 PROBLEM — F33.0 MAJOR DEPRESSIVE DISORDER, RECURRENT EPISODE, MILD: Status: ACTIVE | Noted: 2024-12-13

## 2024-12-13 PROBLEM — F33.0 MAJOR DEPRESSIVE DISORDER, RECURRENT EPISODE, MILD (HCC): Status: ACTIVE | Noted: 2024-12-13

## 2024-12-16 PROBLEM — F41.1 GENERALIZED ANXIETY DISORDER: Status: ACTIVE | Noted: 2024-12-16

## 2024-12-16 PROBLEM — F29 LATE-LIFE PSYCHOSIS (HCC): Status: ACTIVE | Noted: 2024-12-16

## 2024-12-23 ENCOUNTER — OFFICE VISIT (OUTPATIENT)
Dept: WOUND CARE | Facility: HOSPITAL | Age: 74
End: 2024-12-23
Attending: Other
Payer: MEDICARE

## 2024-12-23 PROCEDURE — 99213 OFFICE O/P EST LOW 20 MIN: CPT

## 2024-12-23 NOTE — PROGRESS NOTES
[facility]  Report of Inpatient Wound Care Consultation    Maite Cavazos Patient Status:  Wound Series    1950 MRN [id number]   Location [facility] WOUND [facility] Attending Ramírez Elizalde,*   Hosp Day # 0 PCP Macey Ramos MD     Reason for Consultation:  Left inner buttocks - skin  tear     History of Present Illness:  Maite Cavazos is a a(n) 74 year old female. Patient seen at bedside with a fellow wound care nurse.No open wound to left buttocks at this time ,however, there is a partial thickness  wound on the right buttocks most likely from shear and friction Patient with multiple comorbidities. RN in the room.        History:  Past Medical History:    Anxiety state    Depression    Essential hypertension    High blood pressure    Type 1 diabetes mellitus (HCC)    Vascular dementia (HCC)     No past surgical history on file.   reports that she has quit smoking. Her smoking use included cigarettes. She has never used smokeless tobacco. She reports that she does not drink alcohol and does not use drugs.      Allergies:  @ALLERGY    Laboratory Data:    @LABRCNTIP(WBC:3,HGB:3,HCT:3,PLT:3,CREATSERUM:3,BUN:3,GLU:3,CA:3,ALB:3,TP:3,PTT:3,INR:3,DDIMER:3,ESRML:3,ESRPF:3,CRP:3,B12:3,ETOH:3,PGLU:3,HGa1c:3)@      ASSESSMENT:     Wound 24 Buttocks Right (Active)   Date First Assessed/Time First Assessed: 24 1339   Location: Buttocks  Wound Location Orientation: Right      Assessments 2024  1:47 PM   Wound Image      Wound Length (cm) 1.7 cm   Wound Width (cm) 1.4 cm   Wound Surface Area (cm^2) 2.38 cm^2   Wound Depth (cm) 0.1 cm   Wound Volume (cm^3) 0.238 cm^3   Margins Well-defined edges   Yanna-wound Assessment Blanchable erythema;Excoriated   Wound Odor None   Tunneling? No   Undermining? No   Sinus Tracts? No         Wound Cleaning and Dressings  Wound cleansing:  Cleanse with saline  Wound product: Silver foam/silver bordered foam  Dressing change frequency:   Change dressing daily and/or PRN      Miscellaneous/Additional Orders:  Offloading: Turn Q 2 hours and as needed as much as possible, waffle cushion      Miscellaneous orders: Increase dietary protein intake.Dietitian or Attending physician may need to evaluate amount of protein intake/supplements depending on the kidney functions and other medical conditions.    Additional notes :  Flow sheet shows stage 2 pressure injury  which is incorrect- unable to delete wrong information due to technical difficulty with computer/Epic.This wound is most likely from shear and friction and not pressure injury at this time.      Thank you for this consultation and for allowing me to participate in the care of your patient.      Time Spent 30 Minutes.    Thank you,  Tanesha Calvillo RN BSN [facility] Wound Care Team  12/23/2024  3:20 PM

## 2024-12-24 PROBLEM — F33.0 MAJOR DEPRESSIVE DISORDER, RECURRENT EPISODE, MILD: Status: RESOLVED | Noted: 2024-12-13 | Resolved: 2024-12-24

## 2024-12-24 PROBLEM — F33.0 MAJOR DEPRESSIVE DISORDER, RECURRENT EPISODE, MILD (HCC): Status: RESOLVED | Noted: 2024-12-13 | Resolved: 2024-12-24

## 2024-12-26 ENCOUNTER — TELEPHONE (OUTPATIENT)
Dept: ENDOCRINOLOGY CLINIC | Facility: CLINIC | Age: 74
End: 2024-12-26

## 2024-12-26 NOTE — TELEPHONE ENCOUNTER
Mari has questions about insulin stop date - A1c is currently at 9.1 and there are orders to stop long acting insulin on 1/25/2025.  Please call.  Thank you.

## 2024-12-27 ENCOUNTER — EXTERNAL FACILITY (OUTPATIENT)
Dept: FAMILY MEDICINE CLINIC | Facility: CLINIC | Age: 74
End: 2024-12-27

## 2024-12-27 DIAGNOSIS — F41.1 GENERALIZED ANXIETY DISORDER: ICD-10-CM

## 2024-12-27 DIAGNOSIS — Z86.73 HISTORY OF STROKE: ICD-10-CM

## 2024-12-27 DIAGNOSIS — E10.9 TYPE 1 DIABETES MELLITUS WITHOUT COMPLICATION (HCC): ICD-10-CM

## 2024-12-27 DIAGNOSIS — F01.511 VASCULAR DEMENTIA WITH AGITATION, UNSPECIFIED DEMENTIA SEVERITY (HCC): Primary | ICD-10-CM

## 2024-12-27 DIAGNOSIS — F29 LATE-LIFE PSYCHOSIS (HCC): ICD-10-CM

## 2024-12-27 PROCEDURE — G0317 PROLNG NSG FAC E/M EA ADDL 15 MIN: HCPCS | Performed by: FAMILY MEDICINE

## 2024-12-27 PROCEDURE — 99306 1ST NF CARE HIGH MDM 50: CPT | Performed by: FAMILY MEDICINE

## 2024-12-28 NOTE — PROGRESS NOTES
Maite Cavazos Author: Que Dolan MD     1950 MRN [id number]   Last Hospital  Admission 24      Last Hospital Discharge 24 PCP Macey Ramos MD   [facility] --admitted to [facility] from [facility]    H.P.I Maite Cavazos is a 74 year old female, with history of vascular dementia and multiple strokes, diabetes mellitus, depression, COPD, anxiety disorder, resident of [facility], was brought in for evaluation to the emergency room at [facility] on 24, for behaving strangely for few weeks, her behavior included eating her own feces.  Workup in the ER included EKG that showed normal sinus rhythm, her CBC showed mild anemia with hemoglobin at 11.5 CMP showed slight prerenal azotemia with BUN of 27 and creatinine of 0.8 drug screens were negative a CT scan of the brain showed chronic changes including old infarct on the right with no intracranial bleeding or any acute pathology.  She did not had any physical complaints  She was evaluated by Slade Marlow and was admitted for inpatient treatment of her behavior  And had her medications optimized and Slade Marlow  She was transferred to [facility] dementia unit for rehab    Past Medical History:    Anxiety state    Depression    Essential hypertension    High blood pressure    Type 1 diabetes mellitus (HCC)    Vascular dementia (HCC)     No past surgical history on file.  No family history on file.  Social History     Socioeconomic History    Marital status:    Tobacco Use    Smoking status: Former     Types: Cigarettes    Smokeless tobacco: Never   Vaping Use    Vaping status: Never Used   Substance and Sexual Activity    Alcohol use: Never    Drug use: Never   Other Topics Concern    Caffeine Concern Yes     Comment: daily    Exercise No     Comment: inconsistent     Social Drivers of Health     Financial Resource Strain: Low  Risk  (12/14/2024)    Financial Resource Strain     Med Affordability: No   Food Insecurity: No Food Insecurity (12/14/2024)    Food Insecurity     Food Insecurity: Never true   Transportation Needs: No Transportation Needs (12/14/2024)    Transportation Needs     Lack of Transportation: No    [facility]    Social Connections   Housing Stability: Low Risk  (12/14/2024)    Housing Stability     Housing Instability: No       ALLERGIES:  Allergies[1]    CODE STATUS:  Full Code    CURRENT MEDICATIONS   Current Outpatient Medications   Medication Sig Dispense Refill    insulin aspart 100 Units/mL Subcutaneous Solution Pen-injector Inject 8 Units into the skin 3 (three) times daily with meals. 7.2 mL 0    insulin degludec 100 units/mL Subcutaneous Solution Pen-injector Inject 15 Units into the skin every morning. 4.5 mL 0    insulin degludec 100 units/mL Subcutaneous Solution Pen-injector Inject 10 Units into the skin at bedtime. 3 mL 0    CALTRATE GUMMY BITES OR Take 1 tablet by mouth 2 (two) times daily.      Glucagon, rDNA, (GLUCAGON EMERGENCY) 1 MG Injection Kit Inject 1 mL (1 mg total) into the muscle as needed (if BG is <70). Inject I.M. or SUB-Q into buttocks, arm, or thigh      Acetaminophen ER (MAPAP ARTHRITIS PAIN) 650 MG Oral Tab CR Take 1 tablet (650 mg total) by mouth every 6 (six) hours as needed for Pain.      NOVOLOG FLEXPEN 100 UNIT/ML Subcutaneous Solution Pen-injector Inject 2-8 Units into the skin every evening.     Per sliding scale:     120-150= 2 units   151-199= 3 units   200-249= 5 units   250-299= 6 units   300-349= 7 units   350-400= 8 units      NOVOLOG FLEXPEN 100 UNIT/ML Subcutaneous Solution Pen-injector Inject 4-12 Units into the skin Noon.     Per sliding scale:     120-150= 4 units   151-199= 5 units   200-249= 6 units   250-299= 8 units   300-349= 10 units   350-450= 12 units      NOVOLOG FLEXPEN 100 UNIT/ML Subcutaneous  Solution Pen-injector Inject 6-12 Units into the skin every morning.     Per sliding scale:     120-150= 6 units   151-199= 6 units   200-249= 8 units   250-299= 10 units   300-400= 12 units      sennosides 8.6 MG Oral Tab Take 1 tablet (8.6 mg total) by mouth every 12 (twelve) hours as needed (constipation).      loperamide 2 MG Oral Cap Take 1 capsule (2 mg total) by mouth every 6 (six) hours as needed for Diarrhea (no more than 4 capsules a day).      Continuous Glucose  (FREESTYLE SREEKANTH 2 READER) Does not apply Device Use to check blood sugars daily. 1 each 0    Continuous Glucose Sensor (FREESTYLE SREEKANTH 2 SENSOR) Does not apply Misc 1 each every 14 (fourteen) days. Dx:E10.9 6 each 1    Insulin Pen Needle (BD AUTOSHIELD DUO) 30G X 5 MM Does not apply Misc Pt injects insulin 5 times daily 500 each 1    escitalopram (LEXAPRO) 20 MG Oral Tab Take 1 tablet (20 mg total) by mouth every morning. 30 tablet 11    atorvastatin 40 MG Oral Tab Take 1 tablet (40 mg total) by mouth once daily.      cetirizine 10 MG Oral Tab Take 1 tablet (10 mg total) by mouth daily.      docusate sodium 100 MG Oral Cap Take 1 capsule by mouth every 12 (twelve) hours as needed (constipation).      HYDROcodone-acetaminophen 5-325 MG Oral Tab Take 1 tablet by mouth every 6 (six) hours as needed for Pain.      BREO ELLIPTA 100-25 MCG/ACT Inhalation Aerosol Powder, Breath Activated Inhale 1 puff into the lungs daily.      oxybutynin 5 MG Oral Tab Take 1 tablet (5 mg total) by mouth daily.      clopidogrel 75 MG Oral Tab Take 1 tablet (75 mg total) by mouth daily.      cholecalciferol 50 MCG (2000 UT) Oral Cap Take 1 capsule (2,000 Units total) by mouth daily. 1 tablet everyday      glucose (GLUTOSE 15) 40% Oral Gel Take 37.5 g by mouth as needed (if BS <70).      aspirin 81 MG Oral Chew Tab Chew 1 tablet (81 mg total) by mouth daily.         REVIEW OF SYSTEMS:  Review of Systems:   Constitutional: No fevers, chills, fatigue or night  sweats.  ENT: No mouth pain, neck pain, running nose, headaches or swollen glands.  Skin: No rashes, pruritus or skin changes,  Respiratory: Denies cough, wheezing or shortness of breath.  CV: Denies chest pain, palpitations, orthopnea, PND or dizziness.  Musculoskeletal: No joint pain, stiffness or swelling.  GI: No nausea, vomiting or diarrhea. No blood in stools.  Neurologic: No seizures, tremors, weakness or numbness    VITALS: Pulse 80/min respiration 18/min temperature 98.4 blood pressure 140/70  Her weight is 115 pounds with a BMI of 19    PHYSICAL EXAM:  GENERAL: well developed, poorly nourished in no apparent distress  SKIN: no rashes, no suspicious lesions  Wound; no open wounds  HEENT: atraumatic, normocephalic,   EYES: PERRLA, EOMI, conjunctiva are clear  NECK: supple, no adenopathy, no bruits  CHEST: no chest tenderness  LUNGS: clear to auscultation  CARDIO: RRR without murmur  GI: good BS's, no masses, HSM or tenderness  : deferred  RECTAL: deferred  MUSCULOSKELETAL: back is not tender, FROM of the back  EXTREMITIES: no cyanosis, clubbing or edema  NEURO: Alert awake oriented to self, cranial nerves are intact, motor and sensory are grossly intact      DIAGNOSTICS REVIEWED AT THIS VISIT:  Lab Results   Component Value Date     (H) 12/18/2024    BUN 30 (H) 12/18/2024    CREATSERUM 1.06 (H) 12/18/2024    ANIONGAP 6 12/18/2024    CA 9.7 12/18/2024    OSMOCALC 313 (H) 12/18/2024    ALKPHO 85 12/17/2024    AST 36 (H) 12/17/2024    ALT 14 12/17/2024    BILT 0.7 12/17/2024    TP 6.8 12/17/2024    ALB 3.9 12/17/2024    GLOBULIN 2.9 12/17/2024     12/18/2024    K 4.7 12/18/2024     12/18/2024    CO2 28.0 12/18/2024       Lab Results   Component Value Date    WBC 8.9 12/18/2024    RBC 3.37 (L) 12/18/2024    HGB 10.3 (L) 12/18/2024    HCT 30.9 (L) 12/18/2024    .0 12/18/2024    MCV 91.7 12/18/2024    MCH 30.6 12/18/2024    MCHC 33.3 12/18/2024    RDW 12.7 12/18/2024    NEPRELIM 7.16  12/18/2024    NEPERCENT 80.2 12/18/2024    LYPERCENT 9.1 12/18/2024    MOPERCENT 8.7 12/18/2024    EOPERCENT 1.0 12/18/2024    BAPERCENT 0.4 12/18/2024    NE 7.16 12/18/2024    LYMABS 0.81 (L) 12/18/2024    MOABSO 0.78 12/18/2024    EOABSO 0.09 12/18/2024    BAABSO 0.04 12/18/2024     ASSESSMENT AND PLAN:      Diagnoses and all orders for this visit:    Vascular dementia with agitation, unspecified dementia severity (HCC)  In-house psychiatry on consult, Dr. MAHONEY  PT to work on ambulation, gait, balance, strength, endurance, transfers, safety  - OT to work on fine motor skills, ADLs, hygiene, toileting, transfers, safety  Late-life psychosis (HCC)  Resolved  Generalized anxiety disorder  Lexapro 20 mg daily  History of stroke  PT/OT  Type 1 diabetes mellitus without complication ([facility])  Check sugars before meals and at bedtime  Insulin degludec 10 units daily  NovoLog sliding scale  Malnutrition  With BMI of 19  -Will get dietary and consult    - 24h nursing for medication administration, bowel/bladder care, pain/sleep assessment  - Physician supervision for multiple medical comorbidities, fall risk, DVT risk, infection risk, pain management  - Psych for adjustment to disability and cognitive deficits  - Social work/: for discharge planning needs and access to community resources as needed     -Hospital medications reviewed reconciled and restarted   Check the labs in the morning, CBC CMP TSH, vitamin D, UA    Time spent at appointment today is 95 minutes including preparing to see patient, reviewing test results, performing medically appropriate examination and evaluation and coordinating care, counseling and educating patient/family, ordering medications and testing, and documenting clinical information in EMR.       Que Dolan MD   [facility]  [address]  [facility] [address]    Electronically signed      This dictation was performed with a verbal recognition program (DRAGON) and  it was checked for errors. It is possible that there are still dictated errors within this office note. If so, please bring any errors to my attention for an addendum. All efforts were made to ensure that this office note is accurate        [1]   Allergies  Allergen Reactions     Covid-19 Mrna Vacc (Moderna) OTHER (SEE COMMENTS)     DKA and stroke    DKA and stroke   DKA and stroke    Iodine (Topical) OTHER (SEE COMMENTS)    Radiology Contrast Iodinated Dyes OTHER (SEE COMMENTS)    Eggs Or Egg-Derived Products UNKNOWN    Tositumomab UNKNOWN

## 2025-01-02 NOTE — TELEPHONE ENCOUNTER
Attempted to reach Mari. Call transferred and after ringing, prompted to enter Remote Access Code. Unable to leave VM. Will need to try again at later time.

## 2025-01-02 NOTE — TELEPHONE ENCOUNTER
Called [facility] and asked for Mari. Call was disconnected 2x then spoke with another employee. Mari is unavailable. Requested Mari call back. Will await call.

## 2025-01-20 ENCOUNTER — APPOINTMENT (OUTPATIENT)
Dept: GENERAL RADIOLOGY | Facility: HOSPITAL | Age: 75
End: 2025-01-20
Attending: EMERGENCY MEDICINE
Payer: MEDICARE

## 2025-01-20 ENCOUNTER — HOSPITAL ENCOUNTER (EMERGENCY)
Facility: HOSPITAL | Age: 75
Discharge: HOME | End: 2025-01-20
Attending: EMERGENCY MEDICINE
Payer: MEDICARE

## 2025-01-20 ENCOUNTER — APPOINTMENT (OUTPATIENT)
Dept: CT IMAGING | Facility: HOSPITAL | Age: 75
End: 2025-01-20
Attending: EMERGENCY MEDICINE
Payer: MEDICARE

## 2025-01-20 VITALS
SYSTOLIC BLOOD PRESSURE: 130 MMHG | WEIGHT: 150 LBS | HEART RATE: 72 BPM | OXYGEN SATURATION: 100 % | HEIGHT: 62 IN | BODY MASS INDEX: 27.6 KG/M2 | DIASTOLIC BLOOD PRESSURE: 61 MMHG | RESPIRATION RATE: 14 BRPM | TEMPERATURE: 98 F
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
## 2025-01-20 LAB
ALBUMIN SERPL-MCNC: 3.9 G/DL (ref 3.2–4.8)
ALBUMIN/GLOB SERPL: 1.3 {RATIO} (ref 1–2)
ALP LIVER SERPL-CCNC: 104 U/L
ALT SERPL-CCNC: 18 U/L
ANION GAP SERPL CALC-SCNC: 8 MMOL/L (ref 0–18)
APTT PPP: 23 SECONDS (ref 23–36)
AST SERPL-CCNC: 26 U/L (ref ?–34)
ATRIAL RATE: 79 BPM
BASOPHILS # BLD AUTO: 0.05 X10(3) UL (ref 0–0.2)
BASOPHILS NFR BLD AUTO: 0.4 %
BILIRUB SERPL-MCNC: 0.4 MG/DL (ref 0.2–1.1)
BILIRUB UR QL STRIP.AUTO: NEGATIVE
BUN BLD-MCNC: 21 MG/DL (ref 9–23)
CALCIUM BLD-MCNC: 9.3 MG/DL (ref 8.7–10.6)
CHLORIDE SERPL-SCNC: 103 MMOL/L (ref 98–112)
CLARITY UR REFRACT.AUTO: CLEAR
CO2 SERPL-SCNC: 28 MMOL/L (ref 21–32)
CREAT BLD-MCNC: 0.92 MG/DL
EGFRCR SERPLBLD CKD-EPI 2021: 65 ML/MIN/1.73M2 (ref 60–?)
EOSINOPHIL # BLD AUTO: 0.31 X10(3) UL (ref 0–0.7)
EOSINOPHIL NFR BLD AUTO: 2.3 %
ERYTHROCYTE [DISTWIDTH] IN BLOOD BY AUTOMATED COUNT: 13.5 %
GLOBULIN PLAS-MCNC: 2.9 G/DL (ref 2–3.5)
GLUCOSE BLD-MCNC: 168 MG/DL (ref 70–99)
GLUCOSE UR STRIP.AUTO-MCNC: >1000 MG/DL
HCT VFR BLD AUTO: 36.9 %
HGB BLD-MCNC: 12.7 G/DL
IMM GRANULOCYTES # BLD AUTO: 0.05 X10(3) UL (ref 0–1)
IMM GRANULOCYTES NFR BLD: 0.4 %
INR BLD: 1.02 (ref 0.8–1.2)
KETONES UR STRIP.AUTO-MCNC: 10 MG/DL
LEUKOCYTE ESTERASE UR QL STRIP.AUTO: 25
LYMPHOCYTES # BLD AUTO: 1.54 X10(3) UL (ref 1–4)
LYMPHOCYTES NFR BLD AUTO: 11.6 %
MCH RBC QN AUTO: 30.7 PG (ref 26–34)
MCHC RBC AUTO-ENTMCNC: 34.4 G/DL (ref 31–37)
MCV RBC AUTO: 89.1 FL
MONOCYTES # BLD AUTO: 0.74 X10(3) UL (ref 0.1–1)
MONOCYTES NFR BLD AUTO: 5.6 %
NEUTROPHILS # BLD AUTO: 10.59 X10 (3) UL (ref 1.5–7.7)
NEUTROPHILS # BLD AUTO: 10.59 X10(3) UL (ref 1.5–7.7)
NEUTROPHILS NFR BLD AUTO: 79.7 %
NITRITE UR QL STRIP.AUTO: NEGATIVE
OSMOLALITY SERPL CALC.SUM OF ELEC: 295 MOSM/KG (ref 275–295)
P AXIS: 78 DEGREES
P-R INTERVAL: 152 MS
PH UR STRIP.AUTO: 6 [PH] (ref 5–8)
PLATELET # BLD AUTO: 328 10(3)UL (ref 150–450)
POTASSIUM SERPL-SCNC: 4.3 MMOL/L (ref 3.5–5.1)
PROT SERPL-MCNC: 6.8 G/DL (ref 5.7–8.2)
PROT UR STRIP.AUTO-MCNC: NEGATIVE MG/DL
PROTHROMBIN TIME: 13.5 SECONDS (ref 11.6–14.8)
Q-T INTERVAL: 396 MS
QRS DURATION: 72 MS
QTC CALCULATION (BEZET): 454 MS
R AXIS: 45 DEGREES
RBC # BLD AUTO: 4.14 X10(6)UL
RBC UR QL AUTO: NEGATIVE
SODIUM SERPL-SCNC: 139 MMOL/L (ref 136–145)
SP GR UR STRIP.AUTO: 1.02 (ref 1–1.03)
T AXIS: 69 DEGREES
TROPONIN I SERPL HS-MCNC: 10 NG/L
UROBILINOGEN UR STRIP.AUTO-MCNC: NORMAL MG/DL
VENTRICULAR RATE: 79 BPM
WBC # BLD AUTO: 13.3 X10(3) UL (ref 4–11)

## 2025-01-20 PROCEDURE — 87086 URINE CULTURE/COLONY COUNT: CPT | Performed by: EMERGENCY MEDICINE

## 2025-01-20 PROCEDURE — 99284 EMERGENCY DEPT VISIT MOD MDM: CPT

## 2025-01-20 PROCEDURE — 85730 THROMBOPLASTIN TIME PARTIAL: CPT | Performed by: EMERGENCY MEDICINE

## 2025-01-20 PROCEDURE — 81001 URINALYSIS AUTO W/SCOPE: CPT | Performed by: EMERGENCY MEDICINE

## 2025-01-20 PROCEDURE — 93005 ELECTROCARDIOGRAM TRACING: CPT

## 2025-01-20 PROCEDURE — 84484 ASSAY OF TROPONIN QUANT: CPT | Performed by: EMERGENCY MEDICINE

## 2025-01-20 PROCEDURE — 71045 X-RAY EXAM CHEST 1 VIEW: CPT | Performed by: EMERGENCY MEDICINE

## 2025-01-20 PROCEDURE — 80053 COMPREHEN METABOLIC PANEL: CPT | Performed by: EMERGENCY MEDICINE

## 2025-01-20 PROCEDURE — 85025 COMPLETE CBC W/AUTO DIFF WBC: CPT | Performed by: EMERGENCY MEDICINE

## 2025-01-20 PROCEDURE — 70450 CT HEAD/BRAIN W/O DYE: CPT | Performed by: EMERGENCY MEDICINE

## 2025-01-20 PROCEDURE — 93010 ELECTROCARDIOGRAM REPORT: CPT

## 2025-01-20 PROCEDURE — 85610 PROTHROMBIN TIME: CPT | Performed by: EMERGENCY MEDICINE

## 2025-01-20 PROCEDURE — 72125 CT NECK SPINE W/O DYE: CPT | Performed by: EMERGENCY MEDICINE

## 2025-01-20 PROCEDURE — 36415 COLL VENOUS BLD VENIPUNCTURE: CPT

## 2025-01-20 NOTE — ED QUICK NOTES
Attempted to call nursing home x 2 to update RN, call was transferred both times with no answer.

## 2025-01-20 NOTE — ED PROVIDER NOTES
Patient Seen in: [facility] Emergency Department      History     Chief Complaint   Patient presents with    Trauma     Stated Complaint: FALL    Subjective:   HPI      74-year-old female with history significant for vascular dementia, history of CVA with residual left-sided weakness presenting to the ER after unwitnessed fall.  Patient was found down next to her bed, there was a pillow under her head.  Patient has no recollection of the fall.  Patient arrives boarded and collared by EMS.  Patient has no pain complaints at this time.  Upon review of medication list, patient takes baby aspirin and Plavix daily.    Unable to obtain additional history or review of systems due to patient's LIMITED ROS: dementia/cognitive impairment.      Objective:     Past Medical History:    Anxiety state    Asthma (HCC)    Dementia (HCC)    Depression    Essential hypertension    Hemiplegia and hemiparesis following cerebral infarction affecting left non-dominant side (HCC)    High blood pressure    Hyperlipidemia    Stroke (HCC)    Type 1 diabetes mellitus (HCC)    Vascular dementia (HCC)              History reviewed. No pertinent surgical history.             Social History     Socioeconomic History    Marital status:    Tobacco Use    Smoking status: Former     Types: Cigarettes    Smokeless tobacco: Never   Vaping Use    Vaping status: Never Used   Substance and Sexual Activity    Alcohol use: Never    Drug use: Never   Other Topics Concern    Caffeine Concern Yes     Comment: daily    Exercise No     Comment: inconsistent     Social Drivers of Health     Financial Resource Strain: Low Risk  (12/14/2024)    Financial Resource Strain     Med Affordability: No   Food Insecurity: No Food Insecurity (12/14/2024)    Food Insecurity     Food Insecurity: Never true   Transportation Needs: No Transportation Needs (12/14/2024)    Transportation Needs     Lack of Transportation: No    Received from [facility], [facility]    Social Connections   Housing Stability: Low Risk  (12/14/2024)    Housing Stability     Housing Instability: No                  Physical Exam     ED Triage Vitals [01/20/25 0735]   BP (!) 164/59   Pulse 80   Resp 18   Temp 98.2 °F (36.8 °C)   Temp src Temporal   SpO2 100 %   O2 Device None (Room air)       Current Vitals:   Vital Signs  BP: 130/61  Pulse: 72  Resp: 14  Temp: 98.2 °F (36.8 °C)  Temp src: Temporal  MAP (mmHg): 77    Oxygen Therapy  SpO2: 100 %  O2 Device: None (Room air)        Physical Exam  Vitals and nursing note reviewed.   Constitutional:       General: She is not in acute distress.     Appearance: Normal appearance. She is normal weight.   HENT:      Head: Normocephalic and atraumatic.   Eyes:      Extraocular Movements: Extraocular movements intact.      Pupils: Pupils are equal, round, and reactive to light.   Neck:      Comments: C-collar in place, no C-spine tenderness  Cardiovascular:      Rate and Rhythm: Normal rate and regular rhythm.      Pulses: Normal pulses.   Pulmonary:      Effort: Pulmonary effort is normal. No respiratory distress.      Breath sounds: Normal breath sounds. No wheezing.   Chest:      Chest wall: No tenderness.   Abdominal:      General: Abdomen is flat.      Palpations: Abdomen is soft.      Tenderness: There is no abdominal tenderness.   Musculoskeletal:      Comments: No midline thoracolumbar tenderness, stiffness in the left lower extremity noted no hip tenderness   Skin:     Capillary Refill: Capillary refill takes less than 2 seconds.   Neurological:      General: No focal deficit present.      Mental Status: She is alert.      Comments: Awake, alert, answering questions   Psychiatric:         Mood and Affect: Mood normal.         Behavior: Behavior normal.             ED Course     Labs Reviewed   CBC WITH DIFFERENTIAL WITH PLATELET - Abnormal; Notable for the following components:       Result Value    WBC 13.3 (*)      Neutrophil Absolute Prelim 10.59 (*)     Neutrophil Absolute 10.59 (*)     All other components within normal limits   COMP METABOLIC PANEL (14) - Abnormal; Notable for the following components:    Glucose 168 (*)     All other components within normal limits   URINALYSIS WITH CULTURE REFLEX - Abnormal; Notable for the following components:    Glucose Urine >1000 (*)     Ketones Urine 10 (*)     Leukocyte Esterase Urine 25 (*)     WBC Urine 11-20 (*)     Squamous Epi. Cells Few (*)     All other components within normal limits   TROPONIN I HIGH SENSITIVITY - Normal   PROTHROMBIN TIME (PT) - Normal   PTT, ACTIVATED - Normal   RAINBOW DRAW LAVENDER   RAINBOW DRAW LIGHT GREEN   RAINBOW DRAW BLUE   URINE CULTURE, ROUTINE       ED Course as of 01/20/25 0918  ------------------------------------------------------------  Time: 01/20 0841  Comment: Trivial leukocytosis noted, UA is contaminated, urine culture pending  ------------------------------------------------------------  Time: 01/20 0841  Comment: I independently interpreted EKG.  Below is my emergency physician interpretation of EKG  Normal sinus rhythm, ventricular rate of 79, occasional PVCs  Normal DC, QRS and QTc intervals  Normal ST-T segments.       XR CHEST AP PORTABLE  (CPT=71045)    Result Date: 1/20/2025  CONCLUSION:  No acute radiographic findings.   LOCATION:  [facility]      Dictated by ([facility]): Eddie Paulson MD on 1/20/2025 at 9:04 AM     Finalized by (CST): Eddie Paulson MD on 1/20/2025 at 9:05 AM       CT SPINE CERVICAL (CPT=72125)    Result Date: 1/20/2025  CONCLUSION:  1. No acute cervical spine fracture. 2. Multilevel degenerative changes.    LOCATION:  [facility]   Dictated by ([facility]): Eddie Paulson MD on 1/20/2025 at 8:25 AM     Finalized by (CST): Eddie Paulson MD on 1/20/2025 at 8:28 AM       CT BRAIN OR HEAD (CPT=70450)    Result Date: 1/20/2025  CONCLUSION: 1. No acute intracranial findings 2. Cerebral atrophy with chronic microvascular  ischemic changes.    LOCATION:  [facility]   Dictated by (CST): Eddie Paulson MD on 1/20/2025 at 8:23 AM     Finalized by (CST): Eddie Paulson MD on 1/20/2025 at 8:25 AM                     MDM              Medical Decision Making  Unwitnessed fall  No reproducible tenderness on exam   differential diagnosis includes intracranial hemorrhage, skull fracture, C-spine injury syncope, dizziness, dehydration, electrolyte abnormality  Routine labs and EKG unremarkable  CT head and C-spine are negative, clear chest x-ray  Transfer back to nursing home    Amount and/or Complexity of Data Reviewed  Labs: ordered.  Radiology: ordered.        Disposition and Plan     Clinical Impression:  1. Fall, initial encounter         Disposition:  Discharge  1/20/2025  9:15 am    Follow-up:  Macey Ramos MD  [address]  [address] [id number] [phone]    Follow up  As needed          Medications Prescribed:  Current Discharge Medication List              Supplementary Documentation:

## 2025-01-20 NOTE — ED INITIAL ASSESSMENT (HPI)
Per EMS pt from [facility] for unwitnessed fall this morning. Hx of stroke with left sided weakness and dementia. Currently alert to self, knows she fell, thinks its 2024, can not state place.

## 2025-02-05 NOTE — TELEPHONE ENCOUNTER
Called [facility] for a condition update. Patient is no longer residing at their facility, discharged in January.     Called the patient directly. The phone number is for KONRAD Lopez. Left message to call back.

## 2025-02-11 DIAGNOSIS — E10.65 UNCONTROLLED TYPE 1 DIABETES MELLITUS WITH HYPERGLYCEMIA (HCC): ICD-10-CM

## 2025-02-12 RX ORDER — INSULIN LISPRO 100 [IU]/ML
INJECTION, SOLUTION INTRAVENOUS; SUBCUTANEOUS
Qty: 30 ML | Refills: 0 | Status: SHIPPED | OUTPATIENT
Start: 2025-02-12

## 2025-02-12 RX ORDER — INSULIN ASPART 100 [IU]/ML
INJECTION, SOLUTION INTRAVENOUS; SUBCUTANEOUS
Qty: 27 ML | Refills: 0 | OUTPATIENT
Start: 2025-02-12

## 2025-02-12 RX ORDER — INSULIN DEGLUDEC 100 U/ML
18 INJECTION, SOLUTION SUBCUTANEOUS DAILY
Qty: 18 ML | Refills: 0 | Status: SHIPPED | OUTPATIENT
Start: 2025-02-12

## 2025-02-12 NOTE — TELEPHONE ENCOUNTER
Spoke with the patient's daughter. Maite is currently residing at a new [facility] care facility:    [address]  Phone: [phone].     Typically, she picks up the patient's prescriptions from [facility] and delivers them to [facility]. The nurses were requesting refills on a few of her diabetic medications and sensors.     Last office visit with Dr. Raza: 1/8/2024  Follow up scheduled: 3/13/25    Called [facility]. RN is unavailable at this time. LMTCB with  for BG update and to confirm diabetic regimen and sensor type. Will await call back.

## 2025-02-12 NOTE — TELEPHONE ENCOUNTER
Spoke with the patient's daughter Maite. Updated her on insulin dosing instructions and CGM status. Will need to be seen in office before medicare will pay for CGM refills. She verbalized her understanding. No further questions at this time.

## 2025-02-12 NOTE — TELEPHONE ENCOUNTER
Noted. New orders will be as follows:    - Decrease insulin Degludec to 18 units every morning. Rx sent  - Discontinue insulin aspart. Rx discontinued  - Change insulin lispo dosing instructions to:    Insulin lispro 6 units TID with meals + CF  150-200: 2 units  201-250: 3 units  251 - 300: 4 units  301-350: 5 units  350+: notify MD  HOLD LISPRO IF PRE MEAL BG IS LESS THAN 120. - Rx sent    Updated Prescriptions sent.     Called [facility]. Spoke with RANDALL Muñoz currently caring for Maite. Confirmed patient name and . Provided update insulin orders. Faxed all orders in writing to: [phone].     Called the patient's daughter to provide update and discuss CGM order need for appt. Left message to call back.

## 2025-02-12 NOTE — TELEPHONE ENCOUNTER
Please decrease Degludec to 18 units subcutaneous daily.     Increase Lispro to 6 units subcutaneous TID with meals  Change HOLD parameter to less than 120 ().  Change correction scale also to Lispro so there are not 2 different insulins.

## 2025-02-12 NOTE — TELEPHONE ENCOUNTER
Please see condition update on the patient below. Would you like to modify her insulin regimen at this time? If so, RN requesting refills be sent to pharmacy. Based on your orders we can send. Follow up is scheduled in March.     Called [facility]. Spoke with RANDALL Armas. Patient recently moved into their memory care facility.     BG are as follows:    Date Fasting Before lunch Before dinner Bedtime   2/12/25 83 289     2/11/25 102 315 380 349   2/10/25 89 211 216 135   2/9/25 160 165 305 84   2/8/25 85 256 250 160   2/7/25 155 116 200 270   2/6/25 85 184 281 207   2/5/25 73 231 281 208     Patient's current regimen:  - Insulin degludec 20 units every morning at 8am  - Insulin lispro 5 units BID with meals, hold if pre meal BG is less than 150  - Insulin aspart TID with meals per sliding scale:  150-200: 2 units  201-250: 3 units  251 - 300: 4 units  301-350: 5 units  350+: notify MD LUA confirmed that they currently have orders for two short acting insulins.     Chanda is requesting that refills for insulin degludec and insulin aspart be sent to the patient's outpatient [facility] pharmacy. They have one pen left of each.     She is also requesting refill for freestyle rangel sensors. Explained that patient needs an office visit within the past 6 months in order for medicare to cover. She will need to be seen before medicare will cover a refill on her sensors. For now they will continue fingerstick checks.     Confirmed upcoming appointment information with RN.     Once orders are received from Dr. Raza will update RN and patient daughter.

## 2025-02-24 ENCOUNTER — TELEPHONE (OUTPATIENT)
Dept: ENDOCRINOLOGY CLINIC | Facility: CLINIC | Age: 75
End: 2025-02-24

## 2025-02-24 NOTE — TELEPHONE ENCOUNTER
Spoke with RANDALL Armas and was notified that they are trying to get a UA but since pt is incontinent, they are having trouble getting sample. Advised to give 4 additional units of aspart and to provide update in 2 hours. RANDALL Armas verbalized understanding.

## 2025-02-24 NOTE — TELEPHONE ENCOUNTER
Paged from NH this am, Bg is 465  Patient's Bg have been in the 300 to 400s in the morning s  Discussed to give 6 plus 5 for scale plus extra 2 short acting insulin this morning with BF   Increase tresiba from 18 --> 22 units for today     ENDO STAFF: please call NH and ask for Chanda RN around 10 am for update on BG   Please also ask for last 2-3 days of BG and let Dr Raza know    FYI Dr Raza         Thanks

## 2025-02-24 NOTE — TELEPHONE ENCOUNTER
Ok, noted.  Glucose levels were better 2 days ago.  Any chance she has an infection?  Did NH Check UA?      Ok to give additional 4 units of Aspart and recheck BG level in 2 hours. Thanks.

## 2025-02-24 NOTE — TELEPHONE ENCOUNTER
Dr. Raza,  Spoke to Chanda at [facility] (phone #[phone])  BG today at 10am was 418 - breakfast at 8am: given 13 units aspart and 22 units tresiba    BG readings (2/23)  9:33am   238  11am (before lunch) 384  4pm (before dinner) 277  7pm   139    BG reading (2/22)  8:30am   190  Before lunch  86  9pm   165    BG readings (2/21)  8:51am   119  11:30am  348  4pm (before dinner) 191  6pm 119    (Lunch is around noon and dinner is at 4pm)    Please advise -thanks

## 2025-02-26 ENCOUNTER — TELEPHONE (OUTPATIENT)
Dept: ENDOCRINOLOGY CLINIC | Facility: CLINIC | Age: 75
End: 2025-02-26

## 2025-02-26 NOTE — TELEPHONE ENCOUNTER
SHERRY Delacruz    Called and spoke with RANDALL Armas. Patient BG have now stabilized.     BG taken fasting and before meals   Fast BF Lunch Dinner   2/24 462 419 84 200  2/25 133 268 82 147  2/26 87    Reports compliance with:  Tresiba 18   Novolog 6 units TID plus sliding scale   Hold if BG is less than 120.

## 2025-02-26 NOTE — TELEPHONE ENCOUNTER
RN with [facility] states patient Blood Sugar level fasting today was 88, but before lunch the Blood Sugar was 472.      Transferred call to

## 2025-02-26 NOTE — TELEPHONE ENCOUNTER
Ok, noted.  Glucose elevated due to holding insulin this AM.  Please do NOT hold unless glucose under 80.  For now give 12 units of Novolog to correct glucose. Thanks.

## 2025-02-26 NOTE — TELEPHONE ENCOUNTER
Called Chanda PEREIRA and provided Dr. Raza's instructions below. She would like a new letter faxed with the instructions to hold if Blood glucose pre meal is now less than 80. She will give 12 units now.     Letter faxed to [phone] with updated insulin instructions.

## 2025-02-26 NOTE — TELEPHONE ENCOUNTER
Received call from RANDALL Armas. Calling because patients Blood glucose prior to lunch right now is 472. Blood glucose from the past few days as documented below. Since fasting level was 87 this morning they held her novolog dose with breakfast as ordered.     RN asking how much novolog to inject prior to lunch and if any dosing adjustments need to be made. Thanks,     Reports compliance with:  Tresiba 18   Novolog 6 units TID plus sliding scale   Hold if BG is less than 120.     Chanda RN: [phone]

## 2025-03-05 ENCOUNTER — TELEPHONE (OUTPATIENT)
Dept: ENDOCRINOLOGY CLINIC | Facility: CLINIC | Age: 75
End: 2025-03-05

## 2025-03-05 NOTE — TELEPHONE ENCOUNTER
Dr. Raza,  Spoke to Wanda at [facility] - patient's BG before dinner was 47 - patient given juice and dinner meal - BG at 5:10pm was 138 - no insulin given with dinner       fasting Before lunch Before dinner bedtime   3/5 200  47    3/4   70 52   3/3 137 197 87 256   3/2 80 339 252 246     Wanda confirms:   Insulin degludec 18 units QAM  Lispro 6 units TID + scale:   150-200: 2 units  201-250: 3 units  251-300: 4 units  301-350: 5 units  350+: Notify our office immediately  Hold insulin lispro if pre-meal blood glucose is less than 80.     Wanda stated understanding that there is an on-call provider available after hours and on weekends, if necessary    Please advise -thanks

## 2025-03-05 NOTE — TELEPHONE ENCOUNTER
Wanda from [facility] states patient blood sugar level is at 47 attempting high priority line no answer please call

## 2025-03-06 NOTE — TELEPHONE ENCOUNTER
I spoke with Nurse Wanda and informed her of the changes and that we also have sent a fax with the information that was given over this phone call. The Nurse took notes and stated understanding of changes.  All questions answered.

## 2025-03-06 NOTE — TELEPHONE ENCOUNTER
Ok, noted.  Decrease Tresiba to 16 units subcutaneous QAM.  Change Scale 150-200 1 unit; 201-250 2 units; 251-300 3 units; 301-350 4 units; above 350 5 units. Thanks.

## 2025-03-06 NOTE — TELEPHONE ENCOUNTER
Letter created with insulin changes and faxed to [facility] at [phone]    Tried calling RN caring for patient - no one answered page - message left advising to call clinic for update and to look for fax sent with changes

## 2025-03-13 ENCOUNTER — OFFICE VISIT (OUTPATIENT)
Dept: ENDOCRINOLOGY CLINIC | Facility: CLINIC | Age: 75
End: 2025-03-13
Payer: MEDICARE

## 2025-03-13 VITALS — HEART RATE: 73 BPM | DIASTOLIC BLOOD PRESSURE: 68 MMHG | SYSTOLIC BLOOD PRESSURE: 138 MMHG

## 2025-03-13 DIAGNOSIS — E10.65 UNCONTROLLED TYPE 1 DIABETES MELLITUS WITH HYPERGLYCEMIA (HCC): Primary | ICD-10-CM

## 2025-03-13 LAB
GLUCOSE BLOOD: 133
HEMOGLOBIN A1C: 7.2 % (ref 4.3–5.6)
TEST STRIP LOT #: NORMAL NUMERIC

## 2025-03-13 PROCEDURE — 82947 ASSAY GLUCOSE BLOOD QUANT: CPT | Performed by: INTERNAL MEDICINE

## 2025-03-13 PROCEDURE — 83036 HEMOGLOBIN GLYCOSYLATED A1C: CPT | Performed by: INTERNAL MEDICINE

## 2025-03-13 PROCEDURE — 99214 OFFICE O/P EST MOD 30 MIN: CPT | Performed by: INTERNAL MEDICINE

## 2025-03-13 RX ORDER — ALENDRONATE SODIUM 70 MG/1
70 TABLET ORAL
Qty: 12 TABLET | Refills: 3 | Status: SHIPPED | OUTPATIENT
Start: 2025-03-13

## 2025-03-13 NOTE — PROGRESS NOTES
Name: Maite Cavazos  Date: 3/13/2025    Referring Physician: No ref. provider found    HISTORY OF PRESENT ILLNESS   Maite Cavazos is a 74 year old female who presents for diabetes mellitus type 1, diagnosed approximately 1990.      BG levels continues to be difficult to control at assisted living with significant hyperglycemia.     Insulin doses have been adjusted several times.     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  Last CVA in 2021.  First CVA 1996 -->since most recent hospitalization moved to assisted living          Hypertension present: Yes         Hyperlipidemia present: Yes         Peripheral Vascular Disease present: Yes    : Nephropathy present: No    Neuro: Neuropathy present: Yes    Skin: Infection or ulceration: No    Osteoporosis: Yes, diagnosed in 2015 -->previously maintained on prolia therapy     Thyroid disease: Yes      Medications:     Current Outpatient Medications:     insulin degludec 100 units/mL Subcutaneous Solution Pen-injector, Inject 18 Units into the skin daily., Disp: 18 mL, Rfl: 0    Insulin Lispro, 1 Unit Dial, (HUMALOG KWIKPEN) 100 UNIT/ML Subcutaneous Solution Pen-injector, Inject 6 units into the skin three times per day with meals plus the following sliding scale: 150-200: +2 units 201-250: +3 units 251 - 300: +4 units 301-350: +5 units 350+: risa YEN  Hold lispro if pre meal blood glucose level is less than 120. Up to 33 units per day., Disp: 30 mL, Rfl: 0    Insulin Pen Needle 32G X 4 MM Does not apply Misc, Use pen needles to inject insulin up to 4 times per day, Disp: 400 each, Rfl: 0    CALTRATE GUMMY BITES OR, Take 1 tablet by mouth 2 (two) times daily., Disp: , Rfl:     Glucagon, rDNA, (GLUCAGON EMERGENCY) 1 MG Injection Kit, Inject 1 mL (1 mg total) into the muscle as needed (if BG is <70). Inject I.M. or SUB-Q into buttocks, arm, or thigh, Disp: , Rfl:     Acetaminophen ER (MAPAP ARTHRITIS PAIN) 650 MG Oral Tab CR, Take 1 tablet (650 mg total) by mouth every 6 (six) hours as needed for Pain., Disp: , Rfl:     sennosides 8.6 MG Oral Tab, Take 1 tablet (8.6 mg total) by mouth every 12 (twelve) hours as needed (constipation)., Disp: , Rfl:     loperamide 2 MG Oral Cap, Take 1 capsule (2 mg total) by mouth every 6 (six) hours as needed for Diarrhea (no more than 4 capsules a day)., Disp: , Rfl:     Continuous Glucose  (FREESTYLE SREEKANTH 2 READER) Does not apply Device, Use to check blood sugars daily., Disp: 1 each, Rfl: 0    Continuous Glucose Sensor (FREESTYLE SREEKANTH 2 SENSOR) Does not apply Misc, 1 each every 14 (fourteen) days. Dx:E10.9, Disp: 6 each, Rfl: 1    escitalopram (LEXAPRO) 20 MG Oral Tab, Take 1 tablet (20 mg total) by mouth every morning., Disp: 30 tablet, Rfl: 11    atorvastatin 40 MG Oral Tab, Take 1 tablet (40 mg total) by mouth once daily., Disp: , Rfl:     cetirizine 10 MG Oral Tab, Take 1 tablet (10 mg total) by mouth daily., Disp: , Rfl:     docusate sodium 100 MG Oral Cap, Take 1 capsule by mouth every 12 (twelve) hours as needed (constipation)., Disp: , Rfl:     HYDROcodone-acetaminophen 5-325 MG Oral Tab, Take 1 tablet by mouth every 6 (six) hours as needed for Pain., Disp: , Rfl:     BREO ELLIPTA 100-25 MCG/ACT Inhalation Aerosol Powder, Breath Activated, Inhale 1 puff into the lungs daily., Disp: , Rfl:     oxybutynin 5 MG  Oral Tab, Take 1 tablet (5 mg total) by mouth daily., Disp: , Rfl:     clopidogrel 75 MG Oral Tab, Take 1 tablet (75 mg total) by mouth daily., Disp: , Rfl:     cholecalciferol 50 MCG (2000 UT) Oral Cap, Take 1 capsule (2,000 Units total) by mouth daily. 1 tablet everyday, Disp: , Rfl:     glucose (GLUTOSE 15) 40% Oral Gel, Take 37.5 g by mouth as needed (if BS <70)., Disp: , Rfl:     aspirin 81 MG Oral Chew Tab, Chew 1 tablet (81 mg total) by mouth daily., Disp: , Rfl:      Allergies:   Allergies   Allergen Reactions     Covid-19 Mrna Vacc (Moderna) OTHER (SEE COMMENTS)     DKA and stroke    DKA and stroke   DKA and stroke    Iodine (Topical) OTHER (SEE COMMENTS)    Radiology Contrast Iodinated Dyes OTHER (SEE COMMENTS)    Eggs Or Egg-Derived Products UNKNOWN    Tositumomab UNKNOWN       Social History:   Social History     Socioeconomic History    Marital status: Unknown   Tobacco Use    Smoking status: Former     Types: Cigarettes    Smokeless tobacco: Never   Vaping Use    Vaping status: Never Used   Substance and Sexual Activity    Alcohol use: Never    Drug use: Never   Other Topics Concern    Caffeine Concern Yes     Comment: daily    Exercise No     Comment: inconsistent       Medical History:   Past Medical History:    Anxiety state    Asthma (HCC)    Dementia (HCC)    Depression    Essential hypertension    Hemiplegia and hemiparesis following cerebral infarction affecting left non-dominant side (HCC)    High blood pressure    Hyperlipidemia    Stroke (HCC)    Type 1 diabetes mellitus (HCC)    Vascular dementia (HCC)       Surgical history:   No past surgical history on file.      PHYSICAL EXAM  /68   Pulse 73     General Appearance:  alert, well developed, in no acute distress  Eyes:  normal conjunctivae, sclera., normal sclera and normal pupils  Ears/Nose/Mouth/Throat/Neck:  no palpable thyroid nodules or cervical lymphadenopathy  Musculoskeletal:  normal muscle strength and tone  Skin:  normal  moisture and skin texture  Hair & Nails:  normal scalp hair     Hematologic:  no excessive bruising  Neuro:  sensory grossly intact and motor grossly intact  Psychiatric:  oriented to time, self, and place  Nutritional:  no abnormal weight gain or loss      ASSESSMENT/PLAN:      Diabetes Mellitus Type 1   -Uncontrolled; HgA1c 7.0% -->improved   -Discussed importance of glycemic control to prevent complications of diabetes  -Discussed complications of diabetes include retinopathy, neuropathy, nephropathy and cardiovascular disease  -Discussed ABCs of DM  -Discussed importance of SBGM  -Discussed importance of consistent meals and trying to pair CHO intake with insulin therapy  -Continue Tresiba 16 units subcutaneous QAM; 5 units subcutaneous QPM  -Continue Novolog 6 units subcutaneous TID with meals  -CF 1:50>150   -Normotensive    2. Osteoporosis  - She has been noncompliant with prolia injections  - Start Alendronate 70mg PO weekly, verbalized understanding of risks and benefits       RTC 6 months    3/13/2025  Melly Raza MD

## 2025-03-14 NOTE — TELEPHONE ENCOUNTER
Patient daughter was informed that patient insurance will not cover Dorian 2 sensors.  She was informed that there is an alternative that would be covered but was not given that information.  Please call

## 2025-03-18 RX ORDER — ACYCLOVIR 400 MG/1
1 TABLET ORAL
Qty: 9 EACH | Refills: 1 | Status: SHIPPED | OUTPATIENT
Start: 2025-03-18

## 2025-03-24 ENCOUNTER — TELEPHONE (OUTPATIENT)
Dept: ENDOCRINOLOGY CLINIC | Facility: CLINIC | Age: 75
End: 2025-03-24

## 2025-03-24 NOTE — TELEPHONE ENCOUNTER
Please see condition update below. RN requesting instructions for lunch time dosing.     Called [facility]. Spoke with nurse Chanda. Currently (11:03 am) Blood glucose is \"HI\" via Dorian CGM sensor and via fingerstick is 371.    Dates Before Breakfast  Before Lunch  Before Dinner  HS Comments   3/24/25 566 HI (sensor)      3/23/25 314 185 N/A 332    3/22/25 218 182 108 128    3/21/25 117 350 142 240                              Current medications:    - Tresiba 16 units subcutaneous QAM; 5 units subcutaneous QPM ---> Per their written orders they only have orders for 16 units degludec in the mornings only. No orders for evening dose.   - Novolog 6 units subcutaneous TID with meals -CF 1:50>150 ---> Currently they have orders for lispro but they only have novolog at the care facility. Confirmed they have been giving her novolog as ordered with base and sliding scale. HOlding if Blood glucose is less than 80. Nurse is requesting updated orders that state novolog.     Per Chanda PEREIRA the patient received 11 units of novolog this morning with breakfast.

## 2025-03-24 NOTE — TELEPHONE ENCOUNTER
Returned call to [facility]. Spoke with nurse Chanda. Confirmed patient name and date of birth.     - Instructed patient to receive 10 units of novolog today with lunch.   - Patient to receive Tresiba (insulin degludec) 16 units subcutaneous every morning and 5 unit every evening at bedtime.     Will send instructions in writing via fax. Dr. Raza, to confirm, will she continue with novolog 6 units + SS TID with meals or will she receive 10 units with lunch moving forward? Thanks.

## 2025-03-24 NOTE — TELEPHONE ENCOUNTER
Noted, will call [place] at 10:45 am for update on Patient's Blood glucose levels.     Per Chart review:    To [facility] [address] [address]   Phone: [phone]    Last office visit 3/13/2025:    -Continue Tresiba 16 units subcutaneous QAM; 5 units subcutaneous QPM  -Continue Novolog 6 units subcutaneous TID with meals  -CF 1:50>150   Important Note: Hold insulin lispro if pre-meal blood glucose is less than 80.   - She has been noncompliant with prolia injections  - Start Alendronate 70mg PO weekly, verbalized understanding of risks and benefits

## 2025-03-24 NOTE — TELEPHONE ENCOUNTER
Patient had elevated BG level this AM.  Please call NH to follow up on BG level in 2 hours.     Get BG levels from past 3 days.

## 2025-03-24 NOTE — TELEPHONE ENCOUNTER
Please give 10 units of Novolog with lunch.  Yes give written order to change to Novolog.     Please update Tresiba dosing to 16 units subcutaneous QAM; 5 units subcutaneous QPM. Thanks.

## 2025-03-24 NOTE — TELEPHONE ENCOUNTER
The 10 units is just a one time now for high blood glucose level  She will continue with 6 units subcutaneous TID with meals plus CF moving forward.

## 2025-03-24 NOTE — TELEPHONE ENCOUNTER
Noted. Letter with updated insulin dosing instructions faxed to [facility] at: [phone]

## 2025-03-25 ENCOUNTER — TELEPHONE (OUTPATIENT)
Dept: ENDOCRINOLOGY CLINIC | Facility: CLINIC | Age: 75
End: 2025-03-25

## 2025-03-25 NOTE — TELEPHONE ENCOUNTER
Called [facility], requested to speak to Chanda,  paged her but Chanda did not respond. Asked  to have Chanda call our office as soon as possible to inform her of new orders below.

## 2025-03-25 NOTE — TELEPHONE ENCOUNTER
Spoke to Chanda from [facility]. Call back number is [phone]/ Patient's blood sugar is elevated again today. See TE from yesterday.    This morning her blood sugar was 335 and before lunch it is 479. She has received her Degludec insulin 16 units this morning.    Lispro 10 units this morning and 11 units this afternoon.    Alis, this is one of Dr. Raza's  patients. Please also see TE from yesterday. Thank you.

## 2025-03-25 NOTE — TELEPHONE ENCOUNTER
Called [facility], spoke to Chanda, facility nurse, informed her of new orders. Chanda requested new orders be sent to [facility]. Faxed latest instructions to [facility], zeina Armas.

## 2025-03-25 NOTE — TELEPHONE ENCOUNTER
Update noted.     Please adjust insulin doses to the following:     - increase Novolog    6 units with breakfast + CF 1:50>150    6 units with lunch + CF 1:50>150    6 --> 8 units with dinner + CF 1:50>150        - continue with Tresiba 16 units in AM and 5 units in PM       For Lunch today only---> inject 14 units of Novolog    --> if she already received her lunch dose today, then give an additional 4 units right now (again 1x dose)      Should call me back with before dinner glucose readings -around 4:30pm     Also please clarify if she is eating a different snack later at night? Seems that she is eating something different lately      Thank you!

## 2025-03-26 NOTE — TELEPHONE ENCOUNTER
Called [facility] for a condition update on the patient. Spoke with Nurse Chanda.     Dates Before Breakfast  Before Lunch  Before Dinner  Before Bed   3/26/25 147      3/25/25   127          110                                         Chanda states she will update the patient's medical record with insulin instructions as faxed on 3/25/25. She will call with any updates if needed.

## 2025-03-26 NOTE — TELEPHONE ENCOUNTER
Jacek Tsang [place]. Was informed nurses do not start answering phone calls until after 9 am. Unable to take message. Will call back after 9 am.

## 2025-04-02 ENCOUNTER — APPOINTMENT (OUTPATIENT)
Dept: GENERAL RADIOLOGY | Facility: HOSPITAL | Age: 75
End: 2025-04-02
Attending: STUDENT IN AN ORGANIZED HEALTH CARE EDUCATION/TRAINING PROGRAM
Payer: MEDICARE

## 2025-04-02 ENCOUNTER — HOSPITAL ENCOUNTER (EMERGENCY)
Facility: HOSPITAL | Age: 75
Discharge: HOME | End: 2025-04-02
Attending: STUDENT IN AN ORGANIZED HEALTH CARE EDUCATION/TRAINING PROGRAM
Payer: MEDICARE

## 2025-04-02 ENCOUNTER — APPOINTMENT (OUTPATIENT)
Dept: CT IMAGING | Facility: HOSPITAL | Age: 75
End: 2025-04-02
Attending: STUDENT IN AN ORGANIZED HEALTH CARE EDUCATION/TRAINING PROGRAM
Payer: MEDICARE

## 2025-04-02 VITALS
TEMPERATURE: 98 F | WEIGHT: 149.94 LBS | OXYGEN SATURATION: 98 % | SYSTOLIC BLOOD PRESSURE: 135 MMHG | DIASTOLIC BLOOD PRESSURE: 56 MMHG | RESPIRATION RATE: 16 BRPM | HEART RATE: 86 BPM | BODY MASS INDEX: 27 KG/M2

## 2025-04-02 DIAGNOSIS — S02.2XXA CLOSED FRACTURE OF NASAL BONE, INITIAL ENCOUNTER: Primary | ICD-10-CM

## 2025-04-02 DIAGNOSIS — T14.8XXA SKIN AVULSION: ICD-10-CM

## 2025-04-02 PROCEDURE — 72125 CT NECK SPINE W/O DYE: CPT | Performed by: STUDENT IN AN ORGANIZED HEALTH CARE EDUCATION/TRAINING PROGRAM

## 2025-04-02 PROCEDURE — 73100 X-RAY EXAM OF WRIST: CPT | Performed by: STUDENT IN AN ORGANIZED HEALTH CARE EDUCATION/TRAINING PROGRAM

## 2025-04-02 PROCEDURE — 99284 EMERGENCY DEPT VISIT MOD MDM: CPT

## 2025-04-02 PROCEDURE — 70486 CT MAXILLOFACIAL W/O DYE: CPT | Performed by: STUDENT IN AN ORGANIZED HEALTH CARE EDUCATION/TRAINING PROGRAM

## 2025-04-02 PROCEDURE — 72170 X-RAY EXAM OF PELVIS: CPT | Performed by: STUDENT IN AN ORGANIZED HEALTH CARE EDUCATION/TRAINING PROGRAM

## 2025-04-02 PROCEDURE — 96374 THER/PROPH/DIAG INJ IV PUSH: CPT

## 2025-04-02 PROCEDURE — 73070 X-RAY EXAM OF ELBOW: CPT | Performed by: STUDENT IN AN ORGANIZED HEALTH CARE EDUCATION/TRAINING PROGRAM

## 2025-04-02 PROCEDURE — 70450 CT HEAD/BRAIN W/O DYE: CPT | Performed by: STUDENT IN AN ORGANIZED HEALTH CARE EDUCATION/TRAINING PROGRAM

## 2025-04-02 PROCEDURE — 99285 EMERGENCY DEPT VISIT HI MDM: CPT

## 2025-04-03 NOTE — ED PROVIDER NOTES
History     Chief Complaint   Patient presents with    Fall       HPI    74 year old female with history of dementia presents from memory care facility after fall.  Witnessed that patient was trying to reach up while in a chair and fell forward striking her head on the ground.  Unknown LOC.  EMS was called and placed patient in a c-collar.  Patient is oriented to self.  Complaining of pain in her nose.  EMS noted deformity to the left wrist but was told by facility that it is a chronic contracture.          Past Medical History:    Anxiety state    Asthma (HCC)    Dementia (HCC)    Depression    Essential hypertension    Hemiplegia and hemiparesis following cerebral infarction affecting left non-dominant side (HCC)    High blood pressure    Hyperlipidemia    Stroke (HCC)    Type 1 diabetes mellitus (HCC)    Vascular dementia (HCC)       History reviewed. No pertinent surgical history.    No pertinent social history.                 Physical Exam     ED Triage Vitals   BP 04/02/25 2015 (!) 163/45   Pulse 04/02/25 2015 84   Resp 04/02/25 2015 18   Temp 04/02/25 2030 98.3 °F (36.8 °C)   Temp src 04/02/25 2030 Temporal   SpO2 04/02/25 2015 97 %   O2 Device 04/02/25 2015 None (Room air)       Physical Exam  Constitutional:       General: She is in acute distress.   HENT:      Head:      Comments: Contusion to the frontal.  Abrasion to the bridge of the nose with dried blood along bilateral naris. Blood collection along R septum  No orbital, maxillary, or mandibular ttp     Nose: Nose normal.   Eyes:      Extraocular Movements: Extraocular movements intact.      Conjunctiva/sclera: Conjunctivae normal.      Pupils: Pupils are equal, round, and reactive to light.   Cardiovascular:      Rate and Rhythm: Normal rate.      Pulses: Normal pulses.   Pulmonary:      Effort: Pulmonary effort is normal.   Chest:      Chest wall: No tenderness.   Abdominal:      General: There is no distension.      Palpations: Abdomen is soft.       Tenderness: There is no abdominal tenderness.   Musculoskeletal:      Cervical back: Tenderness present.      Comments: No midline T/L ttp or deformities  Right hip is flexed and rigid, left wrist is flexed and rigid.  Appears contracted.  Distal extremity pulses are normal.  There is a superficial skin tear to the dorsal left wrist on lateral left elbow   Skin:     General: Skin is warm and dry.   Neurological:      General: No focal deficit present.      Mental Status: She is alert.              ED Course     Labs Reviewed - No data to display  XR PELVIS (1 VIEW) (CPT=72170)    Result Date: 4/2/2025  CONCLUSION:  Large amount of rectal stool.  No fracture.     LOCATION:  [facility]   Dictated by (CST): Marino Helm MD on 4/02/2025 at 10:15 PM     Finalized by (CST): Marino Helm MD on 4/02/2025 at 10:16 PM       XR ELBOW, (2 VIEWS), LEFT (CPT=73070)    Result Date: 4/2/2025  CONCLUSION:  No fracture.   LOCATION:  [facility]   Dictated by (CST): Marino Helm MD on 4/02/2025 at 10:15 PM     Finalized by (CST): Marino Helm MD on 4/02/2025 at 10:15 PM       XR WRIST (2 VIEWS), LEFT (CPT=73100)    Result Date: 4/2/2025  CONCLUSION:  No fracture   LOCATION:  [facility]   Dictated by (CST): Marino Helm MD on 4/02/2025 at 10:14 PM     Finalized by (CST): Marino Helm MD on 4/02/2025 at 10:14 PM       CT FACIAL BONES (CPT=70486)    Result Date: 4/2/2025  CONCLUSION:  Probable fractures involving the bilateral nasal bones.  A subtle nasal septal fracture cannot be entirely excluded.    LOCATION:  [id number]   Dictated by (CST): Rasheed Oakley MD on 4/02/2025 at 9:30 PM     Finalized by (CST): Rasheed Oakley MD on 4/02/2025 at 9:33 PM       CT SPINE CERVICAL (CPT=72125)    Result Date: 4/2/2025  CONCLUSION:  No fracture or subluxation.  Stable multilevel degenerative changes.    LOCATION:  [facility]   Dictated by (CST): Marino Helm MD on 4/02/2025 at 9:29 PM     Finalized by (CST): Marino Helm MD on 4/02/2025 at 9:32 PM       CT  BRAIN OR HEAD (CPT=70450)    Result Date: 4/2/2025  CONCLUSION:  1. No acute intracranial pathology. 2. Depressed fracture of nasal bones.    LOCATION:  [facility]   Dictated by (CST): Marino Helm MD on 4/02/2025 at 9:25 PM     Finalized by (CST): Marino Helm MD on 4/02/2025 at 9:29 PM            MDM     Vitals:    04/02/25 2015 04/02/25 2030 04/02/25 2200   BP: (!) 163/45 115/50 135/56   Pulse: 84 82 86   Resp: 18 19 16   Temp:  98.3 °F (36.8 °C)    TempSrc:  Temporal    SpO2: 97% 96% 98%   Weight: 68 kg         Mechanical fall with injuries as above.  Concern for nasal fracture, facial contusion, intracranial hemorrhage, cervical fracture and MSK fractures versus chronic deformities.  Per chart review patient's tetanus was updated 2018  Initial exam was concerning for septal hematoma on the right, after thorough cleansing, no further bleeding and no hematoma noted.  Patient observed without any further recurrence.  Will hold off on any packing at this time.  Discussed with ENT, patient will follow-up outpatient for reduction.    ED Course as of 04/03/25 0129  ------------------------------------------------------------  Time: 04/02 2138  Comment: My interpretation of CT head without intracranial hemorrhage.  Confirmed radiology.  ------------------------------------------------------------  Time: 04/02 2222  Comment: Radiographic imaging without acute fractures.  ------------------------------------------------------------  Time: 04/02 2222  Comment: Radiology is noting depressed nasal bone fracture.  Cervical spine is cleared.     Discussed all findings with daughter at bedside.  Patient cleared to go back to [facility].  Wounds dressed and cleaned    Disposition and Plan     Clinical Impression:  1. Closed fracture of nasal bone, initial encounter    2. Skin avulsion        Disposition:  Discharge    Follow-up:  Sandy Parker MD  [address]  [address]  [facility] [address]  [phone]    Follow up in  1 week(s)        Medications Prescribed:  Discharge Medication List as of 4/2/2025 10:38 PM

## 2025-04-03 NOTE — ED INITIAL ASSESSMENT (HPI)
Pt to ER from [facility] for unwitnessed fall on thinners. Pt A&O x 1. Pt hit head, unknown LOC, pt takes plavix

## 2025-04-07 ENCOUNTER — TELEPHONE (OUTPATIENT)
Dept: ENDOCRINOLOGY CLINIC | Facility: CLINIC | Age: 75
End: 2025-04-07

## 2025-04-07 NOTE — TELEPHONE ENCOUNTER
Spoke to Chanda PEREIRA. Provided recommendations written below by MD. She verbalized understanding. She will call back  in 2 hrs to provide update. Unable to give BG readings the past few days as she is with another patient, will give readings when she calls back with update.

## 2025-04-07 NOTE — TELEPHONE ENCOUNTER
Dr. Raza --    Spoke to alissa PEREIRA from [facility]. Blood sugar was elevated this morning at 438. RN had administered 11 units novolog (6 units + 5 units sliding scale) and administered Tresiba 16 units.     Denies any acute illness, steroid use, or S&S of hyperglycemia. Blood sugars were also elevated this weekend, last night was 412 at 8pm.     Eating carb heavy breakfast (oatmeal, pancakes)   Dinner/lunch varies.     Number to call back: [phone].     Current regimen:  - Novolog                6 units with breakfast + CF 1:50>150                6 units with lunch + CF 1:50>150                8 units with dinner + CF 1:50>150   - Tresiba 16 units in AM and 5 units in PM

## 2025-04-07 NOTE — TELEPHONE ENCOUNTER
Ok, noted.  No change in regimen for now.  Lets check back on BG level in 2 hours.  ALso get a copy of BG levels for the past 3 days. Thanks.

## 2025-04-07 NOTE — TELEPHONE ENCOUNTER
Reviewed below. Overall the BG levels have been at goal.  No need to change insulin doses.  Agree with 11 units at lunch, BG level is improving. Thanks.

## 2025-04-07 NOTE — TELEPHONE ENCOUNTER
Spoke to Chanda PEREIRA at [place]. Provided MC recommendations written below. She verbalized understanding. RN advised to contact office if BG remain persistently above 250. Denies further questions or concerns.

## 2025-04-07 NOTE — TELEPHONE ENCOUNTER
Dr. Raza --    Spoke to Chanda PEREIRA. Before lunch BG was 388, 11 units of novolog was given. (6 units + 5 units sliding scale)    Provided the following sugars:   Fasting Before lunch Before dinner Before bed   4/7/ 438 388     4/6 154 121 342 200   4/5 81 251 106 106   4/4 207 116 103 151

## 2025-04-24 RX ORDER — INSULIN ASPART 100 [IU]/ML
INJECTION, SOLUTION INTRAVENOUS; SUBCUTANEOUS
Qty: 30 ML | Refills: 0 | Status: SHIPPED | OUTPATIENT
Start: 2025-04-24

## 2025-04-24 NOTE — TELEPHONE ENCOUNTER
Endocrine refill protocol for basal insulins     Protocol Criteria: PASSED Reason: N/A    If all below requirements are met, send a 90-day supply with 1 refill per provider protocol.       Verify Appointment with Endocrinology completed in the last 6 months or scheduled in the next 3 months.  Verify A1C has been completed within the last 6 months and is below 8.5%     Last completed office visit:3/13/2025 Melly Raza MD   Next scheduled Follow up:   Future Appointments   Date Time Provider Department Center   9/18/2025  2:30 PM Melly Raza MD [facility]      Last A1c result: Last A1c value was 7.2% done 3/13/2025.

## 2025-05-02 ENCOUNTER — TELEPHONE (OUTPATIENT)
Dept: ENDOCRINOLOGY CLINIC | Facility: CLINIC | Age: 75
End: 2025-05-02

## 2025-05-02 NOTE — TELEPHONE ENCOUNTER
Today patient's sugar level 53 fasting and   Yesterday's  and  4pm 197 6pm 157.     Dates Before Breakfast  Before Lunch  Before Dinner  About 1-2 hours After dinner   Friday, 5/2/2025 53; (after eating 73); now at 208      Thursday,  5/1/2025 103 271 197 157 @0600   Wednesday,4/30/2025 105 81 149 243 @0700   Tuesday,  4/29/2025 80 251 144 144 @0955   Monday,  4/28/2025 146 58 156 75 @0700   4/27/2025 205 261 96 218 @0630   4/26/2025 98 281 108 120 @0830     Denies patient having symptoms and acute illness - states treating low blood glucose with juice and then breakfast.  Nurse states is unsure of what causes low blood glucose    List Medications/Compliance:  - Tresiba 16 units subcutaneous QAM; 5 units subcutaneous Q 2000  - Novolog 6 units subcutaneous with meals; 8 Units Q 4 PM; holding whenever blood glucose less then 80  -CF 1:50>150     Informed blood glucose should be above 80.

## 2025-05-02 NOTE — TELEPHONE ENCOUNTER
Ok, noted.  Lets decrease AM Tresiba to 15 unit subcutaneous QAM; 5 units subcutaneous QPM. Thanks.

## 2025-05-02 NOTE — TELEPHONE ENCOUNTER
Spoke to RANDALL Armas to relay message below - RANDALL Armas stated understanding to decrease AM dose of degludec to 15 units and keep PM dose at 5 units    Letter faxed with updated instructions - faxed to [facility] at [phone]

## 2025-05-06 ENCOUNTER — TELEPHONE (OUTPATIENT)
Dept: ENDOCRINOLOGY CLINIC | Facility: CLINIC | Age: 75
End: 2025-05-06

## 2025-05-06 NOTE — TELEPHONE ENCOUNTER
Dr. Putnam was paged - patient had BG of 42    Tried calling [facility] for update (phone #[phone]) - per  RN caring for patient not available - will call clinic asap

## 2025-05-06 NOTE — TELEPHONE ENCOUNTER
Phone room said that call was disconnected before they transferred.  Called them back.  --    Low blood glucose Treated with glucagon.    New blood glucose reading is 180.    Hypoglycemia  Patterns/Onset of hypoglycemia: States this is the first time (see Telephone Encounter 5/2/2025) - denies knowing what prompted the low blood glucose.    Symptoms: denies all - states no loss of consciousness and was talking normal    Acute illness:denies    Change in Diet: denies    List Medications/Compliance:  Tresiba 15 units subcutaneous QAM; 5 units subcutaneous QPM  Novolog TID with meals and Sliding Scale - holding if less than 80   6 with breakfast and Lunch  8 with Dinner

## 2025-05-06 NOTE — TELEPHONE ENCOUNTER
Asked to send new letter to ensure Units are correct.    Per Telephone Encounter 5/2/2025: \"faxed to [place] at [phone]\"  Fax Sent to above number.  Confirmation received.

## 2025-05-06 NOTE — TELEPHONE ENCOUNTER
Spoke to RANDALL Armas caring for patient to relay message below - Chanda PEREIRA stated understanding to decrease tresiba to 6 units in the AM and continue 5 units in the PM and no changes to novolog doses  RANDALL Armas stated understanding to contact clinic if BG ,80 or persistently >300    Letter sent with updated insulin doses    CIS posterior bladder wall

## 2025-05-06 NOTE — TELEPHONE ENCOUNTER
Noted  Glad Bg are better now    Tresiba 15--> CHANGE TO 6 units subcutaneous QAM; 5 units subcutaneous QPM--> continue evening dose   Novolog TID with meals and Sliding Scale - holding if less than 80   6 with breakfast and Lunch  8 with Dinner    Can given tresiba 6 units now , rest same  Call if Bg are under 80 or persistently over 300          Staff from rehab paged this am with low BG in the 40s  Fasting BG in the 90d yesterday, now low  \/ low normal prior to that   BG during the days have been okay   Discussed to follows hypoglycemia protoocol and call back with update ( discussed to treat low BG and call MD right after once patient is stable and safe--> staff confirmed they have protocol; MD can also be called at thte same time if two staff members are available, but Rx of low Bg should not be delayed --> staff member verbalized understanding)   NO call received  Staff called as above

## 2025-05-27 RX ORDER — INSULIN DEGLUDEC 100 U/ML
18 INJECTION, SOLUTION SUBCUTANEOUS DAILY
Qty: 18 ML | Refills: 0 | Status: SHIPPED | OUTPATIENT
Start: 2025-05-27

## 2025-05-27 NOTE — TELEPHONE ENCOUNTER
Endocrine refill protocol for basal insulins     Protocol Criteria: PASSED Reason: N/A    If all below requirements are met, send a 90-day supply with 1 refill per provider protocol.       Verify Appointment with Endocrinology completed in the last 6 months or scheduled in the next 3 months.  Verify A1C has been completed within the last 6 months and is below 8.5%     Last completed office visit:3/13/2025 Melly Raza MD   Last completed telemed visit: Visit date not found  Next scheduled Follow up:   Future Appointments   Date Time Provider Department Center   9/18/2025  2:30 PM Melly Raza MD [facility]      Last A1c result: Last A1c value was 7.2% done 3/13/2025.

## 2025-07-01 ENCOUNTER — APPOINTMENT (OUTPATIENT)
Dept: ULTRASOUND IMAGING | Facility: HOSPITAL | Age: 75
End: 2025-07-01
Attending: EMERGENCY MEDICINE
Payer: MEDICARE

## 2025-07-01 ENCOUNTER — HOSPITAL ENCOUNTER (EMERGENCY)
Facility: HOSPITAL | Age: 75
Discharge: HOME | End: 2025-07-01
Attending: EMERGENCY MEDICINE
Payer: MEDICARE

## 2025-07-01 VITALS
BODY MASS INDEX: 27 KG/M2 | HEART RATE: 65 BPM | TEMPERATURE: 98 F | DIASTOLIC BLOOD PRESSURE: 66 MMHG | SYSTOLIC BLOOD PRESSURE: 144 MMHG | OXYGEN SATURATION: 98 % | RESPIRATION RATE: 15 BRPM | WEIGHT: 150 LBS

## 2025-07-01 DIAGNOSIS — N39.0 URINARY TRACT INFECTION WITHOUT HEMATURIA, SITE UNSPECIFIED: Primary | ICD-10-CM

## 2025-07-01 LAB
ALBUMIN SERPL-MCNC: 4 G/DL (ref 3.2–4.8)
ALBUMIN/GLOB SERPL: 1.3 {RATIO} (ref 1–2)
ALP LIVER SERPL-CCNC: 107 U/L (ref 55–142)
ALT SERPL-CCNC: 12 U/L (ref 10–49)
ANION GAP SERPL CALC-SCNC: 8 MMOL/L (ref 0–18)
AST SERPL-CCNC: 20 U/L (ref ?–34)
BASOPHILS # BLD AUTO: 0.06 X10(3) UL (ref 0–0.2)
BASOPHILS NFR BLD AUTO: 0.7 %
BILIRUB SERPL-MCNC: 0.3 MG/DL (ref 0.2–1.1)
BILIRUB UR QL STRIP.AUTO: NEGATIVE
BUN BLD-MCNC: 25 MG/DL (ref 9–23)
CALCIUM BLD-MCNC: 9.3 MG/DL (ref 8.7–10.6)
CHLORIDE SERPL-SCNC: 102 MMOL/L (ref 98–112)
CO2 SERPL-SCNC: 29 MMOL/L (ref 21–32)
CREAT BLD-MCNC: 1.51 MG/DL (ref 0.55–1.02)
EGFRCR SERPLBLD CKD-EPI 2021: 36 ML/MIN/1.73M2 (ref 60–?)
EOSINOPHIL # BLD AUTO: 0.62 X10(3) UL (ref 0–0.7)
EOSINOPHIL NFR BLD AUTO: 7.4 %
ERYTHROCYTE [DISTWIDTH] IN BLOOD BY AUTOMATED COUNT: 14 %
GLOBULIN PLAS-MCNC: 3 G/DL (ref 2–3.5)
GLUCOSE BLD-MCNC: 176 MG/DL (ref 70–99)
GLUCOSE UR STRIP.AUTO-MCNC: NORMAL MG/DL
HCT VFR BLD AUTO: 32.6 % (ref 35–48)
HGB BLD-MCNC: 10.6 G/DL (ref 12–16)
IMM GRANULOCYTES # BLD AUTO: 0.02 X10(3) UL (ref 0–1)
IMM GRANULOCYTES NFR BLD: 0.2 %
KETONES UR STRIP.AUTO-MCNC: NEGATIVE MG/DL
LEUKOCYTE ESTERASE UR QL STRIP.AUTO: 500
LIPASE SERPL-CCNC: 42 U/L (ref 12–53)
LYMPHOCYTES # BLD AUTO: 2.23 X10(3) UL (ref 1–4)
LYMPHOCYTES NFR BLD AUTO: 26.5 %
MCH RBC QN AUTO: 29.2 PG (ref 26–34)
MCHC RBC AUTO-ENTMCNC: 32.5 G/DL (ref 31–37)
MCV RBC AUTO: 89.8 FL (ref 80–100)
MONOCYTES # BLD AUTO: 0.59 X10(3) UL (ref 0.1–1)
MONOCYTES NFR BLD AUTO: 7 %
NEUTROPHILS # BLD AUTO: 4.88 X10 (3) UL (ref 1.5–7.7)
NEUTROPHILS # BLD AUTO: 4.88 X10(3) UL (ref 1.5–7.7)
NEUTROPHILS NFR BLD AUTO: 58.2 %
NITRITE UR QL STRIP.AUTO: NEGATIVE
OSMOLALITY SERPL CALC.SUM OF ELEC: 297 MOSM/KG (ref 275–295)
PH UR STRIP.AUTO: 6 [PH] (ref 5–8)
PLATELET # BLD AUTO: 363 10(3)UL (ref 150–450)
POTASSIUM SERPL-SCNC: 4.7 MMOL/L (ref 3.5–5.1)
PROT SERPL-MCNC: 7 G/DL (ref 5.7–8.2)
PROT UR STRIP.AUTO-MCNC: 30 MG/DL
RBC # BLD AUTO: 3.63 X10(6)UL (ref 3.8–5.3)
RBC UR QL AUTO: NEGATIVE
SODIUM SERPL-SCNC: 139 MMOL/L (ref 136–145)
SP GR UR STRIP.AUTO: 1.01 (ref 1–1.03)
UROBILINOGEN UR STRIP.AUTO-MCNC: NORMAL MG/DL
WBC # BLD AUTO: 8.4 X10(3) UL (ref 4–11)
WBC #/AREA URNS AUTO: >50 /HPF

## 2025-07-01 PROCEDURE — 87086 URINE CULTURE/COLONY COUNT: CPT | Performed by: EMERGENCY MEDICINE

## 2025-07-01 PROCEDURE — 99284 EMERGENCY DEPT VISIT MOD MDM: CPT

## 2025-07-01 PROCEDURE — 83690 ASSAY OF LIPASE: CPT | Performed by: EMERGENCY MEDICINE

## 2025-07-01 PROCEDURE — 80053 COMPREHEN METABOLIC PANEL: CPT | Performed by: EMERGENCY MEDICINE

## 2025-07-01 PROCEDURE — 93971 EXTREMITY STUDY: CPT | Performed by: EMERGENCY MEDICINE

## 2025-07-01 PROCEDURE — 81001 URINALYSIS AUTO W/SCOPE: CPT | Performed by: EMERGENCY MEDICINE

## 2025-07-01 PROCEDURE — 96365 THER/PROPH/DIAG IV INF INIT: CPT

## 2025-07-01 PROCEDURE — 80053 COMPREHEN METABOLIC PANEL: CPT

## 2025-07-01 PROCEDURE — 85025 COMPLETE CBC W/AUTO DIFF WBC: CPT | Performed by: EMERGENCY MEDICINE

## 2025-07-01 PROCEDURE — 85025 COMPLETE CBC W/AUTO DIFF WBC: CPT

## 2025-07-01 PROCEDURE — 83690 ASSAY OF LIPASE: CPT

## 2025-07-01 RX ORDER — CEPHALEXIN 500 MG/1
500 CAPSULE ORAL 3 TIMES DAILY
Qty: 21 CAPSULE | Refills: 0 | Status: SHIPPED | OUTPATIENT
Start: 2025-07-01 | End: 2025-07-08

## 2025-07-01 NOTE — ED INITIAL ASSESSMENT (HPI)
PT was being treated for a UTI which has not resolved after taking abx.  Per EMS pt is at her baseline and has no complaints.

## 2025-07-02 NOTE — ED PROVIDER NOTES
Patient Seen in: [facility] Emergency Department        History  Chief Complaint   Patient presents with    Urinary Symptoms     Stated Complaint: UTI not getting better    Subjective:   HPI            75-year-old female with past medical history of vascular dementia, CVA, diabetes, hypertension presents today for evaluation.  Per report from RN, patient had urine test concerning for UTI that was not improving after antibiotics.  Patient's past medical history limits her history.  She has no complaints at the time my assessment however her history is not completely reliable.  She denies any back pain, abdominal pain, dysuria, hematuria, fevers or vomiting.      Objective:     Past Medical History:    Anxiety state    Asthma (HCC)    Dementia (HCC)    Depression    Essential hypertension    Hemiplegia and hemiparesis following cerebral infarction affecting left non-dominant side (HCC)    High blood pressure    Hyperlipidemia    Stroke (HCC)    Type 1 diabetes mellitus (HCC)    Vascular dementia (HCC)              History reviewed. No pertinent surgical history.             Social History     Socioeconomic History    Marital status:    Tobacco Use    Smoking status: Former     Types: Cigarettes    Smokeless tobacco: Never   Vaping Use    Vaping status: Never Used   Substance and Sexual Activity    Alcohol use: Never    Drug use: Never   Other Topics Concern    Caffeine Concern Yes     Comment: daily    Exercise No     Comment: inconsistent     Social Drivers of Health     Food Insecurity: No Food Insecurity (12/14/2024)    Food Insecurity     Food Insecurity: Never true   Transportation Needs: No Transportation Needs (12/14/2024)    Transportation Needs     Lack of Transportation: No   Housing Stability: Low Risk  (12/14/2024)    Housing Stability     Housing Instability: No                                Physical Exam    ED Triage Vitals   BP 07/01/25 1918 152/72   Pulse 07/01/25 1844 71   Resp  07/01/25 1844 16   Temp 07/01/25 1844 97.5 °F (36.4 °C)   Temp src 07/01/25 1844 Oral   SpO2 07/01/25 1844 100 %   O2 Device 07/01/25 1844 None (Room air)       Current Vitals:   Vital Signs  BP: 144/66  Pulse: 65  Resp: 15  Temp: 97.5 °F (36.4 °C)  Temp src: Oral  MAP (mmHg): 88    Oxygen Therapy  SpO2: 98 %  O2 Device: None (Room air)            Physical Exam  Vitals and nursing note reviewed.   Constitutional:       Appearance: Normal appearance.   HENT:      Head: Normocephalic.      Nose: Nose normal.      Mouth/Throat:      Mouth: Mucous membranes are moist.   Eyes:      Extraocular Movements: Extraocular movements intact.   Cardiovascular:      Rate and Rhythm: Normal rate.   Pulmonary:      Effort: Pulmonary effort is normal.   Abdominal:      General: Abdomen is flat.      Tenderness: There is no abdominal tenderness. There is no guarding or rebound.   Musculoskeletal:         General: Normal range of motion.      Right lower leg: No edema.      Left lower leg: Edema present.   Skin:     General: Skin is warm.   Neurological:      General: No focal deficit present.      Mental Status: She is alert.   Psychiatric:         Mood and Affect: Mood normal.             ED Course  Labs Reviewed   COMP METABOLIC PANEL (14) - Abnormal; Notable for the following components:       Result Value    Glucose 176 (*)     BUN 25 (*)     Creatinine 1.51 (*)     Calculated Osmolality 297 (*)     eGFR-Cr 36 (*)     All other components within normal limits   CBC WITH DIFFERENTIAL WITH PLATELET - Abnormal; Notable for the following components:    RBC 3.63 (*)     HGB 10.6 (*)     HCT 32.6 (*)     All other components within normal limits   URINALYSIS WITH CULTURE REFLEX - Abnormal; Notable for the following components:    Clarity Urine Turbid (*)     Protein Urine 30 (*)     Leukocyte Esterase Urine 500 (*)     WBC Urine >50 (*)     RBC Urine 6-10 (*)     Bacteria Urine Rare (*)     Squamous Epi. Cells Few (*)     All other  components within normal limits   LIPASE - Normal   URINE CULTURE, ROUTINE          US VENOUS DOPPLER LEG LEFT - DIAG IMG (CPT=93971)  Result Date: 7/1/2025  PROCEDURE: US VENOUS DOPPLER LEG LEFT - DIAG IMG (CPT=93971) INDICATIONS: eval for DVT PATIENT STATED HISTORY: COMPARISON: No comparisons. TECHNIQUE:  Real time, grey scale, and duplex ultrasound was used to evaluate the lower extremity venous system. B-mode two-dimensional images of the vascular structures, Doppler spectral analysis, and color flow.  Doppler imaging were performed.  The following veins were imaged:  Common, deep, and superficial femoral, popliteal, sapheno-femoral junction, posterior tibial veins, and the contralateral common femoral vein. FINDINGS: SAPHENOFEMORAL JUNCTION: No reflux. THROMBI: None visible. COMPRESSION: Normal compressibility, phasicity, and augmentation. OTHER: Negative.     CONCLUSION: No evidence of DVT in the left lower extremity. Electronically Verified and Signed by Attending Radiologist: Jass Doss MD 7/1/2025 9:06 PM Workstation: [id number]                      [facility]     Differential Diagnosis  75-year-old female presents today for evaluation of abnormal urine from nursing home.  She reportedly was on antibiotics and her symptoms persisted.  Presently, she is hemodynamically stable and in no acute distress.  Her belly is benign.  Will obtain urine to assess for UTI.  Plan for labs to assess for signs of KAMINI, electrolyte abnormality.    On my assessment, patient's left leg is swollen.  Unclear if this is chronic swelling versus an acute change.  The left lower extremities neurovascularly intact.  Plan for lower extremity Doppler to assess for DVT.    8:04 pm  Patient's daughter is here to help supplement history.  Several weeks ago, there was changes to her personality at her [facility] care facility.  It was difficult for them to obtain a urine sample so she was empirically treated for UTI.  She complained of some  suprapubic discomfort earlier today which was inconsistent.  She again complained this evening which prompted her being sent for evaluation.  Presently, she has no complaints of pain.  Patient has had some degree of swelling the left leg because of her lack of mobility.  Daughter thinks maybe is slightly worse than previous, so we will continue with plan for ultrasound.    9:51 pm  Urine suggestive of UTI.  Remaining workup unremarkable.  IV antibiotics were given in the ER.  Patient remains otherwise well-appearing.  Will DC with prescription for oral antibiotics.  Daughter comfortable with plan.    History from [facility]  Daughter helps with history        Medical Decision Making      Disposition and Plan     Clinical Impression:  1. Urinary tract infection without hematuria, site unspecified         Disposition:  Discharge  7/1/2025  9:52 pm    Follow-up:  Hari Cespedes MD  [facility] [id number] [phone]    Call in 1 day(s)            Medications Prescribed:  Discharge Medication List as of 7/1/2025 10:03 PM        START taking these medications    Details   cephALEXin 500 MG Oral Cap Take 1 capsule (500 mg total) by mouth 3 (three) times daily for 7 days., Normal, Disp-21 capsule, R-0                   Supplementary Documentation:

## 2025-07-02 NOTE — DISCHARGE INSTRUCTIONS
Follow-up with your primary care doctor in the next week for reassessment.  Give the antibiotic as prescribed.  Return for any concerning symptoms such as fevers, pain or vomiting.

## 2025-07-10 RX ORDER — INSULIN ASPART 100 [IU]/ML
INJECTION, SOLUTION INTRAVENOUS; SUBCUTANEOUS
Qty: 30 ML | Refills: 0 | Status: SHIPPED | OUTPATIENT
Start: 2025-07-10

## 2025-07-10 NOTE — TELEPHONE ENCOUNTER
Endocrine refill protocol for basal insulins     Protocol Criteria: PASSED Reason: N/A    If all below requirements are met, send a 90-day supply with 1 refill per provider protocol.       Verify Appointment with Endocrinology completed in the last 6 months or scheduled in the next 3 months.  Verify A1C has been completed within the last 6 months and is below 8.5%     Last completed office visit:3/13/2025 Melly Raza MD   Last completed telemed visit: Visit date not found  Next scheduled Follow up:   Future Appointments   Date Time Provider Department Center   9/18/2025  2:30 PM Melly Raza MD [facility] [place]      Last A1c result: Last A1c value was 7.2% done 3/13/2025.

## 2025-07-14 RX ORDER — INSULIN ASPART 100 [IU]/ML
INJECTION, SOLUTION INTRAVENOUS; SUBCUTANEOUS
Qty: 30 ML | Refills: 0 | OUTPATIENT
Start: 2025-07-14

## 2025-07-14 RX ORDER — INSULIN DEGLUDEC 100 U/ML
INJECTION, SOLUTION SUBCUTANEOUS
Qty: 18 ML | Refills: 0 | Status: SHIPPED | OUTPATIENT
Start: 2025-07-14

## 2025-07-14 RX ORDER — INSULIN DEGLUDEC 100 U/ML
18 INJECTION, SOLUTION SUBCUTANEOUS DAILY
Qty: 16.2 ML | Refills: 1 | Status: SHIPPED | OUTPATIENT
Start: 2025-07-14

## 2025-07-14 NOTE — TELEPHONE ENCOUNTER
Please confirm prescription change for Tresiba made back in TE 5/6/2025, see below, thanks prescription pending with change     Tresiba 15--> CHANGE TO 6 units subcutaneous QAM; 5 units subcutaneous QPM--> continue evening dose

## 2025-07-14 NOTE — TELEPHONE ENCOUNTER
Endocrine refill protocol for basal insulins     Protocol Criteria: PASSED Reason: N/A    If all below requirements are met, send a 90-day supply with 1 refill per provider protocol.       Verify Appointment with Endocrinology completed in the last 6 months or scheduled in the next 3 months.  Verify A1C has been completed within the last 6 months and is below 8.5%     Last completed office visit:3/13/2025 Melly Raza MD   Last completed telemed visit: Visit date not found  Next scheduled Follow up:   Future Appointments   Date Time Provider Department Center   9/18/2025  2:30 PM Melly Raza [place] [facility] [place]      Last A1c result: Last A1c value was 7.2% done 3/13/2025.     90 day + 1 refill pending

## 2025-07-30 ENCOUNTER — TELEPHONE (OUTPATIENT)
Dept: ENDOCRINOLOGY CLINIC | Facility: CLINIC | Age: 75
End: 2025-07-30

## 2025-08-06 ENCOUNTER — TELEPHONE (OUTPATIENT)
Dept: ENDOCRINOLOGY CLINIC | Facility: CLINIC | Age: 75
End: 2025-08-06

## (undated) NOTE — LETTER
3/24/2025      To [facility] [address] [address]     Regarding:  Maite Cavazos  : 1950    Dear Facility Director or Nursing Staff,    I am writing to inform you of a necessary adjustment to Ms. Cavazos’s insulin regimen in order to better manage her blood glucose levels. After careful evaluation, the following changes are recommended:    Increase Insulin Degludec dose to 16 units every morning and 5 units every evening at bedtime.    Continue Novolog Dosing Instructions:    Administer 6 units of Novolog three times daily with meals (TID) with sliding scale.   Follow the sliding scale for blood glucose correction as follows:  150-200: 1 units  201-250: 2 units  251-300: 3 units  301-350: 4 units  350+: 5 units and Notify our office immediately.    Important Note: Hold Novolog if pre-meal blood glucose is less than 80.    May discontinue old orders for insulin lispro.     Additionally, please continue checking blood glucose levels before each meal and at bedtime.    Contact our office immediately if:  Blood glucose levels are less than 80,  Blood glucose levels remain persistently greater than 250.  We appreciate your careful monitoring of Ms. Cavazos’s glucose levels and your attention to these changes. Should you have any questions or concerns, or if further clarification is needed, please do not hesitate to contact my office directly.    Thank you for your continued care of Ms. Cavazos.    Sincerely,    MD [address], [facility] [address]  [place] [address]  Ph: [phone]  Fax: [phone]

## (undated) NOTE — LETTER
10/23/2023        To:   [facility]  Attn: Nurse Tracy De La Paz  Fax:  [phone]  Re:  Precious Damon 04/18/1950    As discussed over the phone, here's Dr. Fuentes Anand recommendation/order after reviewing the blood sugar log faxed to our office. Increase Lantus to 8 units two times a day. Continue Novolog scale for now    We will call the sister to see if she can bring the  on Thursday for download. If you have any questions, please feel free to contact the office at [phone].       Sincerely,    [facility], [address] [place] [address]  [address]  [place] [id number]

## (undated) NOTE — LETTER
2024              Maite MUSHTAQ Cavazos        [address]        [facility] [address]                                                                                   : 1950         To Whom it May Concern;    Please adjust Maite's insulin doses as follows:    Increase Lantus 8 units every morning and 6 units every evening     Novolog according to scale:      Breakfast   Glucose 120-150 5 units  151-199 8 units  200-249 12 units  250-300 12 units  300-400 14 units     Lunch:   120-150 2 units  151-199 3 units  200-249 5 units   250-299 6 units  300-349 7 units   350-400 8 units      Dinner:  120-150  3 units  151-199  4 units  200-249 6 units  250-299 7 units  300-349 8 units  350-450 10 units               Sincerely,    Melly Raza MD  [facility], [facility] [place]  [address] [facility], [address]  [facility] [id number] [phone]        Document electronically generated by:  Virgen BALLESTEROS RN

## (undated) NOTE — IP AVS SNAPSHOT
Patient Demographics     Address  [address]  [facility] [address] Phone  [phone] (Home)  [phone] (Work)  [phone] (Mobile) *Preferred* E-mail Address  [email or web address]      Patient Contacts     Name Relation Home Work Mobile    YAKOV DURAN Daughter   [phone]    Chandan Olguin Brother   [phone]    Ioana Quispe   [phone]      Allergies as of 5/20/2024  Review status set to In Progress on 5/18/2024       Noted Reaction Type Reactions     Covid-19 Mrna Vacc (moderna) 02/03/2022    OTHER (SEE COMMENTS)    DKA and stroke  DKA and stroke   DKA and stroke    Iodine (topical) 01/29/2010    OTHER (SEE COMMENTS)    Radiology Contrast Iodinated Dyes 07/30/2019    OTHER (SEE COMMENTS)      Code Status Information     Code Status    Not on file      Patient Instructions    None      Follow-up Information     Macey Ramos MD. Schedule an appointment as soon as possible for a visit in 2 week(s).    Specialties: Family Medicine, IP Consult to Primary Care  Contact information:  [address]  [facility] [address] [phone]             Navid Frank MD. Call.    Specialty: CARDIOLOGY  Why: As needed  Contact information:  [address]  [facility] [address] [phone]                        Your Home Meds List      TAKE these medications       Instructions Authorizing Provider Morning Afternoon Evening As Needed   amLODIPine 2.5 MG Tabs  Commonly known as: Norvasc  Next dose due: Tuesday, 5/21/2024       Take 1 tablet (2.5 mg total) by mouth daily.          aspirin 81 MG Tbec  Next dose due: Tuesday, 5/21/2024       Take 1 tablet (81 mg total) by mouth daily.          BD AutoShield Duo 30G X 5 MM Misc  Generic drug: Insulin Pen Needle      Pt injects insulin 5 times daily   Melly Morel Ellipta 100-25 MCG/ACT Aepb  Generic drug: fluticasone furoate-vilanterol  Next dose due: Tomorrow, 5/21      Inhale 1 puff into the lungs  daily.          CALTRATE GUMMY BITES OR  Notes to patient: Resume home regimen       Take by mouth.          cholecalciferol 50 MCG (2000 UT) Caps  Commonly known as: Vitamin D3  Next dose due: Tuesday, 5/21/2024       Take 1 capsule (2,000 Units total) by mouth daily. 1 tablet everyday          clopidogrel 75 MG Tabs  Commonly known as: Plavix  Next dose due: Tuesday, 5/21/2024       Take 1 tablet (75 mg total) by mouth daily.          escitalopram 10 MG Tabs  Commonly known as: Lexapro  Next dose due: Tuesday, 5/21/2024       Take 1 tablet (10 mg total) by mouth daily.          Glutose 15 40% Gel  Generic drug: glucose      Take 37.5 mL (15 g total) by mouth once.          Gvoke HypoPen 2-Pack 1 MG/0.2ML injection  Generic drug: glucagon      Inject 0.2 mL (1 mg total) into the skin.          insulin degludec 100 units/mL Sopn  Next dose due: Tonight, 5/20/2024       Inject 6 Units into the skin nightly.          Tresiba FlexTouch 100 UNIT/ML Sopn  Generic drug: insulin degludec  Next dose due: Tuesday, 5/21/2024       Inject 8 Units into the skin daily for 10 days.  Stop taking on: May 29, 2024   Cecilia Olaru         losartan 50 MG Tabs  Commonly known as: Cozaar  Next dose due: Tuesday, 5/21/2024       Take 1 tablet (50 mg total) by mouth daily.          NovoLOG FlexPen 100 UNIT/ML Sopn  Generic drug: insulin aspart  Next dose due: Take as directed       Inject 3 times daily with meals in accordance to sliding scale. Max daily dose 27 units.   Melly Raza         oxybutynin 5 MG Tabs  Commonly known as: Ditropan  Next dose due: Tomorrow, 5/21      Take 1 tablet (5 mg total) by mouth daily.                Where to Get Your Medications      Please  your prescriptions at the location directed by your doctor or nurse    Bring a paper prescription for each of these medications  Tresiba FlexTouch 100 UNIT/ML Sopn           [id number] - MAR ACTION REPORT  (last 48 hrs)    ** SITE UNKNOWN **     Order ID  Medication Name Action Time Action Reason Comments    [id number] acetaminophen (Tylenol Extra Strength) tab 500 mg 05/18/24 2058 Given      [id number] amLODIPine (Norvasc) tab 2.5 mg 05/19/24 0902 Given      [id number] amLODIPine (Norvasc) tab 2.5 mg 05/20/24 1016 Given      [id number] aspirin DR tab 81 mg 05/19/24 0902 Given      [id number] aspirin DR tab 81 mg 05/20/24 1015 Given      [id number] atorvastatin (Lipitor) tab 40 mg 05/18/24 2058 Given      [id number] atorvastatin (Lipitor) tab 40 mg 05/19/24 1942 Given      [id number] cetirizine (ZyrTEC) tab 10 mg 05/19/24 0902 Given      [id number] cetirizine (ZyrTEC) tab 10 mg 05/20/24 1016 Given      [id number] clopidogrel (Plavix) tab 75 mg 05/19/24 0902 Given      [id number] clopidogrel (Plavix) tab 75 mg 05/20/24 1015 Given      [id number] escitalopram (Lexapro) tab 10 mg 05/19/24 0902 Given      [id number] escitalopram (Lexapro) tab 10 mg 05/20/24 1016 Given      [id number] fluticasone furoate-vilanterol (Breo Ellipta) 100-25 MCG/ACT inhaler 1 puff 05/19/24 0902 Given      [id number] fluticasone furoate-vilanterol (Breo Ellipta) 100-25 MCG/ACT inhaler 1 puff 05/20/24 1011 Given      [id number] losartan (Cozaar) tab 50 mg 05/19/24 0902 Given      [id number] losartan (Cozaar) tab 50 mg 05/20/24 1015 Given      [id number] melatonin tab 3 mg 05/18/24 2058 Given      [id number] oxybutynin ER (Ditropan-XL) 24 hr tab 5 mg 05/19/24 0902 Given      [id number] oxybutynin ER (Ditropan-XL) 24 hr tab 5 mg 05/20/24 1016 Given      [id number] polyethylene glycol (PEG 3350) (Miralax) 17 g oral packet 17 g 05/20/24 0555 Given            LEFT LOWER ABDOMEN     Order ID Medication Name Action Time Action Reason Comments    [id number] enoxaparin (Lovenox) 40 MG/0.4ML SUBQ injection 40 mg 05/19/24 0902 Given      [id number] enoxaparin (Lovenox) 40 MG/0.4ML SUBQ injection 40 mg 05/20/24 1018 Given      [id number] insulin aspart (NovoLOG) 100 Units/mL FlexPen 1-20 Units 05/18/24 2211 Given  bg 313     [id number] insulin aspart (NovoLOG) 100 Units/mL FlexPen 1-20 Units 05/19/24 2151 Given            LEFT UPPER ABDOMEN     Order ID Medication Name Action Time Action Reason Comments    [id number] insulin degludec 100 units/mL flextouch 6 Units 05/19/24 2151 Given            LEFT UPPER ARM     Order ID Medication Name Action Time Action Reason Comments    [id number] insulin aspart (NovoLOG) 100 Units/mL FlexPen 1-20 Units 05/19/24 0529 Given      [id number] insulin aspart (NovoLOG) 100 Units/mL FlexPen 1-20 Units 05/19/24 1404 Given  Per Dr. Schaefer,     [id number] insulin aspart (NovoLOG) 100 Units/mL FlexPen 1-20 Units 05/19/24 1804 Given      [id number] insulin aspart (NovoLOG) 100 Units/mL FlexPen 1-68 Units 05/19/24 1015 Given      [id number] insulin aspart (NovoLOG) 100 Units/mL FlexPen 1-68 Units 05/20/24 0918 Given      [id number] insulin degludec 100 units/mL flextouch 6 Units 05/18/24 2210 Given  bg 313    [id number] insulin degludec 100 units/mL flextouch 8 Units 05/19/24 0902 Given      [id number] insulin degludec 100 units/mL flextouch 8 Units 05/20/24 1013 Given            RIGHT UPPER ARM     Order ID Medication Name Action Time Action Reason Comments    [id number] insulin aspart (NovoLOG) 100 Units/mL FlexPen 1-20 Units 05/20/24 1126 Given  bg 329    [id number] insulin aspart (NovoLOG) 100 Units/mL FlexPen 1-68 Units 05/19/24 1412 Given      [id number] insulin aspart (NovoLOG) 100 Units/mL FlexPen 1-68 Units 05/19/24 1805 Given      [id number] insulin aspart (NovoLOG) 100 Units/mL FlexPen 1-68 Units 05/20/24 1221 Given              Recent Vital Signs    Flowsheet Row Most Recent Value   /91 Filed at 05/20/2024 1130   Pulse 70 Filed at 05/20/2024 1435   Resp 17 Filed at 05/20/2024 1130   Temp 97.8 °F (36.6 °C) Filed at 05/20/2024 1130   SpO2 98 % Filed at 05/20/2024 0816      Patient's Most Recent Weight    Flowsheet Row Most Recent Value   Patient Weight 53.4 kg (117 lb 11.6 oz)          Lab Results Last 24 Hours      Basic Metabolic Panel (8) [[id number]] (Abnormal)  Resulted: 24 1732, Result status: Final result   Ordering provider: Cecilia Schaefer MD  24 1630 Resulting lab: [facility] ([facility])    Specimen Information    Type Source Collected On   Blood — 24 1657          Components    Component Value Reference Range Flag Lab   Glucose 323 70 - 99 mg/dL [facility] ([facility])   Sodium 133 136 - 145 mmol/L [facility] ([facility])   Potassium 4.4 3.5 - 5.1 mmol/L — [facility] ([facility])   Chloride 107 98 - 112 mmol/L — [facility] ([facility])   CO2 17.0 21.0 - 32.0 mmol/L [facility] ([facility])   Anion Gap 9 0 - 18 mmol/L — [facility] ([facility])   BUN 29 9 - 23 mg/dL [facility] ([facility])   Creatinine 0.87 0.55 - 1.02 mg/dL — [facility])   Calcium, Total 8.5 8.5 - 10.1 mg/dL — [facility] ([facility])   Calculated Osmolality 294 275 - 295 mOsm/kg — [facility] ([facility])   eGFR-Cr 70 >=60 mL/min/1.73m2 — [facility] ([facility])            Testing Performed By     Lab - Abbreviation Name Director Address Valid Date Range    139 - [facility] ([facility]) [facility] ([facility]) Goldberg, Cathryn A. MD [address] [place] [id number] 20 1441 - Present            Microbiology Results (All)     None         H&P - H&P Note      H&P signed by Xavi Guan DO at 2024  9:36 AM  Version 1 of 1    Author: Xavi Guan DO Service: Hospitalist Author Type: Physician    Filed: 2024  9:36 AM Date of Service: 2024  7:41 AM Status: Signed    : Xavi Guan DO (Physician)         [facility]  History and Physical     Maite G Cavazos Patient Status:  Observation    1950 MRN [id number]   [facility] 8NE-A Attending Cecilia Schaefer MD   Hosp Day # 0 PCP Macey Ramos MD     Chief Complaint: Chest pain    Subjective:    History of Present Illness:     Maite Cavazos is a 74 year old female with  history of vascular dementia, type 1 diabetes, hypertension, hyperlipidemia depression presents emergency room with chest pain started last night substernal with no radiation.  Started before coming to the emergency room.  Patient did states she felt a little bit of shortness of breath when having the pain.  She denies any dizziness, lightheadedness, diaphoresis, nausea, vomiting.  Reported the patient was given aspirin and sublingual nitro spray prior to paramedics coming.    History/Other:    Past Medical History:  Past Medical History:    Anxiety state    Depression    Essential hypertension    High blood pressure    Type 1 diabetes mellitus (HCC)     Past Surgical History:   History reviewed. No pertinent surgical history.   Family History:   History reviewed. No pertinent family history.  Social History:    reports that she has quit smoking. Her smoking use included cigarettes. She has never used smokeless tobacco. She reports that she does not drink alcohol and does not use drugs.     Allergies:   Allergies   Allergen Reactions     Covid-19 Mrna Vacc (Moderna) OTHER (SEE COMMENTS)     DKA and stroke    DKA and stroke   DKA and stroke    Iodine (Topical) OTHER (SEE COMMENTS)    Radiology Contrast Iodinated Dyes OTHER (SEE COMMENTS)       Medications:    Current Facility-Administered Medications on File Prior to Encounter   Medication Dose Route Frequency Provider Last Rate Last Admin    [COMPLETED] sodium chloride 0.9 % IV bolus 1,000 mL  1,000 mL Intravenous Once Joi Dolna MD   Stopped at 03/24/24 1221    [COMPLETED] insulin aspart (NovoLOG) 100 Units/mL vial 5 Units  0.1 Units/kg Subcutaneous Once Joi Dolan MD   5 Units at 03/24/24 0928    [COMPLETED] sulfamethoxazole-trimethoprim DS (Bactrim DS) 800-160 MG per tab 1 tablet  1 tablet Oral Once Joi Dolan MD   1 tablet at 03/24/24 1428     Current Outpatient Medications on File Prior to Encounter   Medication Sig Dispense Refill    Insulin Degludec  (TRESIBA FLEXTOUCH) 100 UNIT/ML Subcutaneous Solution Pen-injector Inject 0.12 mL (12 Units total) into the skin daily. 15 mL 3    insulin aspart (NOVOLOG FLEXPEN) 100 Units/mL Subcutaneous Solution Pen-injector Inject 3 times daily with meals in accordance to sliding scale. Max daily dose 27 units. 27 mL 0    Budesonide-Formoterol Fumarate 80-4.5 MCG/ACT Inhalation Aerosol Inhale 2 puffs into the lungs 2 (two) times daily.      clopidogrel 75 MG Oral Tab Take 1 tablet (75 mg total) by mouth daily.      escitalopram 10 MG Oral Tab Take 1 tablet (10 mg total) by mouth daily.      losartan 50 MG Oral Tab Take 1 tablet (50 mg total) by mouth daily.      montelukast 10 MG Oral Tab Take 1 tablet (10 mg total) by mouth daily.      oxybutynin ER 5 MG Oral Tablet 24 Hr Take 1 tablet (5 mg total) by mouth daily.      Acetaminophen ER (MAPAP ARTHRITIS PAIN) 650 MG Oral Tab CR Take 1 tablet (650 mg total) by mouth every 8 (eight) hours as needed for Pain.      aspirin 81 MG Oral Tab EC Take 1 tablet (81 mg total) by mouth daily.      [] sulfamethoxazole-trimethoprim -160 MG Oral Tab per tablet Take 1 tablet by mouth 2 (two) times daily for 7 days. 14 tablet 0    Insulin Pen Needle (BD AUTOSHIELD DUO) 30G X 5 MM Does not apply Misc Pt injects insulin 5 times daily 500 each 1    insulin glargine (LANTUS SOLOSTAR) 100 UNIT/ML Subcutaneous Solution Pen-injector Inject 8 Units into the skin in the morning and 8 Units before bedtime. 15 mL 0    glucagon (GVOKE HYPOPEN 2-PACK) 1 MG/0.2ML Subcutaneous SUBQ injection Inject 0.2 mL (1 mg total) into the skin.      albuterol (2.5 MG/3ML) 0.083% Inhalation Nebu Soln Inhale 3 mL into the lungs every 6 (six) hours as needed.      amLODIPine 2.5 MG Oral Tab Take 1 tablet (2.5 mg total) by mouth daily.      atorvastatin 40 MG Oral Tab Take 1 tablet (40 mg total) by mouth nightly.      cetirizine 10 MG Oral Tab Take 1 tablet (10 mg total) by mouth daily.      insulin detemir 100  UNIT/ML Subcutaneous Solution Inject 5 Units into the skin 2 (two) times daily.      cholecalciferol 50 MCG (2000 UT) Oral Cap Take 1 capsule (2,000 Units total) by mouth daily. 1 tablet everyday      ergocalciferol 1.25 MG (49193 UT) Oral Cap Take 1 capsule (50,000 Units total) by mouth once a week.      Ca Phosphate-Cholecalciferol (CALTRATE GUMMY BITES OR) Take by mouth.      glucose (GLUTOSE 15) 40% Oral Gel Take 37.5 mL (15 g total) by mouth once.      Loperamide HCl (ANTI-DIARRHEAL) 2 MG Oral Tab Take 1 tablet (2 mg total) by mouth daily.         Review of Systems:   A comprehensive review of systems was completed.    Pertinent positives and negatives noted in the HPI.    Objective:   Physical Exam:    /68   Pulse 67   Temp 97.8 °F (36.6 °C) (Oral)   Resp 16   Wt 113 lb 15.7 oz (51.7 kg)   SpO2 94%   BMI 19.56 kg/m²   General: No acute distress, Alert  Respiratory: No rhonchi, no wheezes  Cardiovascular: S1, S2. Regular rate and rhythm  Abdomen: Soft, Non-tender, non-distended, positive bowel sounds  Neuro: No new focal deficits  Extremities: No edema      Results:    Labs:      Labs Last 24 Hours:    Recent Labs   Lab 05/18/24 0210   RBC 3.54*   HGB 11.0*   HCT 31.7*   MCV 89.5   MCH 31.1   MCHC 34.7   RDW 12.3   NEPRELIM 2.80   WBC 5.6   .0       Recent Labs   Lab 05/18/24 0210   *   BUN 27*   CREATSERUM 0.72   EGFRCR 88   CA 8.8   ALB 3.3*      K 4.1      CO2 30.0   ALKPHO 116   AST 17   ALT 23   BILT 0.3   TP 6.3*       No results found for: \"PT\", \"INR\"    Recent Labs   Lab 05/18/24 0210   TROPHS 7       No results for input(s): \"TROP\", \"PBNP\" in the last 168 hours.    No results for input(s): \"PCT\" in the last 168 hours.    Imaging: Imaging data reviewed in Epic.    Assessment & Plan:      # Chest pain  -Will rule out ACS with serial troponins, echocardiogram.    # Type 2 diabetes  - placed on hypoglycemia protocol with long-acting and correction factor  insulin.    # Hypertension  -Will continue on losartan, amlodipine.    # Hyperlipidemia   -will continue on statin therapy    # Depression  -Will continue SSRI.    # Vascular dementia        Plan of care discussed with patient at bedside.    Xavi Guan DO    Supplementary Documentation:     The  Century Cures Act makes medical notes like these available to patients in the interest of transparency. Please be advised this is a medical document. Medical documents are intended to carry relevant information, facts as evident, and the clinical opinion of the practitioner. The medical note is intended as peer to peer communication and may appear blunt or direct. It is written in medical language and may contain abbreviations or verbiage that are unfamiliar.                                 Electronically signed by Xavi Guan DO on 2024  9:36 AM              Consults - MD Consult Notes      Consults signed by Navid Frank MD at 2024 12:41 PM     Author: Navid Frank MD Service: Cardiology Author Type: Physician    Filed: 2024 12:41 PM Date of Service: 2024  5:28 PM Status: Addendum    : Navid Frank MD (Physician)    Related Notes: Original Note by Navid Frank MD (Physician) filed at 2024 12:29 PM       [facility] Cardiology  Report of Consultation    Maite Cavazos Patient Status:  Observation    1950 MRN [id number]   [facility] 8NE-A Attending Cecilia Schaefer MD   Hosp Day # 0 PCP Macey Ramos MD     Reason for Consultation:   CP    History of Present Illness:   Maite Cavazos is a(n) 74 year old female with a history of DM, HTN, dyslipidemia and vascular dementia who presented with CP.  She notes it was \"sharp\" and localized to a small area on her mid chest.  It came on at random.  It lasted 10 minutes.  There was no significant radiation.  There was no exertional aspect of the pain.  It resolved  spontaneously.  To my history there was no associated dyspnea.  She  has had no subsequent episodes.    She is presently in bed and comfortable.  She denies any more CP.  She is breathing comfortably and denies any palpitations or dizziness.  At baseline she can do mild exertion and notes no CP with that level of activity (walking about at home).      Past Medical History:   Past Medical History:    Anxiety state    Depression    Essential hypertension    High blood pressure    Type 1 diabetes mellitus (HCC)   CVA x 7  Sinus pause (8s) by event monitor, EP suggested no PPM    Social History:   Smoking:  None  Alcohol:  None    Family History:   No family history of premature arthrosclerotic heart disease     Medications:   Scheduled:    amLODIPine  2.5 mg Oral Daily    aspirin  81 mg Oral Daily    atorvastatin  40 mg Oral Nightly    fluticasone furoate-vilanterol  1 puff Inhalation Daily    cetirizine  10 mg Oral Daily    clopidogrel  75 mg Oral Daily    escitalopram  10 mg Oral Daily    losartan  50 mg Oral Daily    oxybutynin ER  5 mg Oral Daily    insulin aspart  1-20 Units Subcutaneous TID AC and HS    insulin aspart  1-68 Units Subcutaneous TID CC    enoxaparin  40 mg Subcutaneous Daily    insulin degludec  8 Units Subcutaneous Daily    insulin degludec  6 Units Subcutaneous Nightly       Continuous Infusion:       PRN Medications:     albuterol    glucose **OR** glucose **OR** glucose-vitamin C **OR** dextrose **OR** glucose **OR** glucose **OR** glucose-vitamin C    melatonin    ondansetron    metoclopramide    acetaminophen    polyethylene glycol (PEG 3350)    sennosides    bisacodyl    fleet enema    Outpatient Medications:   Current Facility-Administered Medications on File Prior to Encounter   Medication Dose Route Frequency Provider Last Rate Last Admin    [COMPLETED] sodium chloride 0.9 % IV bolus 1,000 mL  1,000 mL Intravenous Once Joi Dolan MD   Stopped at 03/24/24 1221    [COMPLETED] insulin  aspart (NovoLOG) 100 Units/mL vial 5 Units  0.1 Units/kg Subcutaneous Once Joi Dolan MD   5 Units at 24 0928    [COMPLETED] sulfamethoxazole-trimethoprim DS (Bactrim DS) 800-160 MG per tab 1 tablet  1 tablet Oral Once Joi Dolan MD   1 tablet at 24 1428     Current Outpatient Medications on File Prior to Encounter   Medication Sig Dispense Refill    insulin degludec 100 units/mL Subcutaneous Solution Pen-injector Inject 6 Units into the skin nightly.      Insulin Degludec (TRESIBA FLEXTOUCH) 100 UNIT/ML Subcutaneous Solution Pen-injector Inject 0.12 mL (12 Units total) into the skin daily. (Patient taking differently: Inject 8 Units into the skin daily.) 15 mL 3    insulin aspart (NOVOLOG FLEXPEN) 100 Units/mL Subcutaneous Solution Pen-injector Inject 3 times daily with meals in accordance to sliding scale. Max daily dose 27 units. 27 mL 0    amLODIPine 2.5 MG Oral Tab Take 1 tablet (2.5 mg total) by mouth daily.      Budesonide-Formoterol Fumarate 80-4.5 MCG/ACT Inhalation Aerosol Inhale 2 puffs into the lungs 2 (two) times daily.      clopidogrel 75 MG Oral Tab Take 1 tablet (75 mg total) by mouth daily.      escitalopram 10 MG Oral Tab Take 1 tablet (10 mg total) by mouth daily.      losartan 50 MG Oral Tab Take 1 tablet (50 mg total) by mouth daily.      montelukast 10 MG Oral Tab Take 1 tablet (10 mg total) by mouth daily.      oxybutynin ER 5 MG Oral Tablet 24 Hr Take 1 tablet (5 mg total) by mouth daily.      Acetaminophen ER (MAPAP ARTHRITIS PAIN) 650 MG Oral Tab CR Take 1 tablet (650 mg total) by mouth every 8 (eight) hours as needed for Pain.      Ca Phosphate-Cholecalciferol (CALTRATE GUMMY BITES OR) Take by mouth.      aspirin 81 MG Oral Tab EC Take 1 tablet (81 mg total) by mouth daily.      [] sulfamethoxazole-trimethoprim -160 MG Oral Tab per tablet Take 1 tablet by mouth 2 (two) times daily for 7 days. 14 tablet 0    Insulin Pen Needle (BD AUTOSHIELD DUO) 30G X 5 MM Does  not apply Misc Pt injects insulin 5 times daily 500 each 1    insulin glargine (LANTUS SOLOSTAR) 100 UNIT/ML Subcutaneous Solution Pen-injector Inject 8 Units into the skin in the morning and 8 Units before bedtime. 15 mL 0    glucagon (GVOKE HYPOPEN 2-PACK) 1 MG/0.2ML Subcutaneous SUBQ injection Inject 0.2 mL (1 mg total) into the skin.      albuterol (2.5 MG/3ML) 0.083% Inhalation Nebu Soln Inhale 3 mL into the lungs every 6 (six) hours as needed.      atorvastatin 40 MG Oral Tab Take 1 tablet (40 mg total) by mouth nightly.      cetirizine 10 MG Oral Tab Take 1 tablet (10 mg total) by mouth daily.      insulin detemir 100 UNIT/ML Subcutaneous Solution Inject 5 Units into the skin 2 (two) times daily.      cholecalciferol 50 MCG (2000 UT) Oral Cap Take 1 capsule (2,000 Units total) by mouth daily. 1 tablet everyday      ergocalciferol 1.25 MG (97691 UT) Oral Cap Take 1 capsule (50,000 Units total) by mouth once a week.      glucose (GLUTOSE 15) 40% Oral Gel Take 37.5 mL (15 g total) by mouth once.      Loperamide HCl (ANTI-DIARRHEAL) 2 MG Oral Tab Take 1 tablet (2 mg total) by mouth daily.         Allergies:   Allergies   Allergen Reactions     Covid-19 Mrna Vacc (Moderna) OTHER (SEE COMMENTS)     DKA and stroke    DKA and stroke   DKA and stroke    Iodine (Topical) OTHER (SEE COMMENTS)    Radiology Contrast Iodinated Dyes OTHER (SEE COMMENTS)       Review of Systems:   No fevers, chills, change in weight or bowel habits.  Ten point review of systems is otherwise negative or unremarkable.    Physical Exam:   Vitals:    05/19/24 1039   BP:    Pulse: 61   Resp:    Temp:      Wt Readings from Last 3 Encounters:   05/18/24 113 lb 15.7 oz (51.7 kg)   01/27/24 110 lb 3.7 oz (50 kg)   09/29/23 110 lb (49.9 kg)           General: Well developed, well nourished female.  Pt is in no acute distress.  HEENT:   Normocephalic.  Atraumatic.  Eyes with no scleral icterus.  Neck: Supple.  No JVD.  Carotids 2+ and equal in  symmetric fashion.  No bruits are noted.  Cardiac: Regular rate and rhythm.   There is a normal S1 and S2.  No S3 or S4.  No murmurs, rubs, or gallops.  PMI is non-displaced with a normal apical impulse.  Lungs: Clear to ascultation bilaterally.  No focal rales, rhonchi, or wheezes.  Good air movement is noted throughout all lung fields.  Abdomen: Soft.  Non-distended.  Non-tender.  Bowel sounds are present and normoactive.  No guarding or rebound.   Extremities: Extremities do not demonstrate any evidence of peripheral edema.   No cyanosis or clubbing of the digits is appreciated.  Femoral, Dorsalis Pedis, and Posterior Tibialis  pulses are 2+ and equal in a symmetric fashion.  Neurologic: Alert and oriented, normal affect.  No gross deficit appreciated.  Integument:  No visible rashes are appreciated.      Laboratories and Data:   Labs:    Recent Labs   Lab 05/18/24 0210 05/19/24  0510   * 273*   BUN 27* 32*   CREATSERUM 0.72 0.89   CA 8.8 8.7   ALB 3.3*  --     138   K 4.1 4.2    109   CO2 30.0 24.0   ALKPHO 116  --    AST 17  --    ALT 23  --    BILT 0.3  --    TP 6.3*  --        Recent Labs   Lab 05/18/24 0210 05/19/24  0510   RBC 3.54* 3.57*   HGB 11.0* 10.8*   HCT 31.7* 32.9*   MCV 89.5 92.2   MCH 31.1 30.3   MCHC 34.7 32.8   RDW 12.3 12.1   NEPRELIM 2.80 3.13   WBC 5.6 5.6   .0 272.0     Troponin 7    No results for input(s): \"PTP\", \"INR\" in the last 168 hours.    No results for input(s): \"TROP\", \"CK\" in the last 168 hours.    Diagnostics:   Tele: NSR.  EKG: Sinus.  Low voltage R wave in V3.  This DOES NOT represent an anterior infarct.  The computer IS WRONG.  It is most likely LEAD PLACEMENT RELATED.  Essentially NORMAL EKG.         Assessment:  1. Atypical CP  2. DM  3. HTN  4. Dyslipidemia  5. Vascular dementia, CVA x7  6. Sinus pause of 8s (EP no PPM)      Plan:  1. CP: Atypical.  Echo to check wall motion.  If recurrent consider stress test but if echo acceptable and no  further CP, can DC without stress.  2. DM: Per primary team.  3. Lipids: Statin.  4. HTN: ARB, CCB.  5. Pause: Tele monitoring.    Navid Frank MD        Electronically signed by Navid Frank MD on 5/19/2024 12:41 PM           D/C Summary    No notes of this type exist for this encounter.        Physical Therapy Notes (last 72 hours)      Physical Therapy Note signed by Sonia Maradiaga PT at 5/20/2024  2:33 PM  Version 1 of 1    Author: Sonia Maradiaga PT Service: Rehab Author Type: Physical Therapist    Filed: 5/20/2024  2:33 PM Date of Service: 5/20/2024  2:21 PM Status: Signed    : Sonia Maradiaga PT (Physical Therapist)          PHYSICAL THERAPY TREATMENT NOTE - INPATIENT    Room Number: 8610/8610-A     Session: 1     Number of Visits to Meet Established Goals: 3    Presenting Problem: chest pain of uncertain etiology  Co-Morbidities : vascular dementia, stroke, DMT1, HTN, HLD    PHYSICAL THERAPY MEDICAL/SOCIAL HISTORY  History related to current admission: Patient is a 74 year old female admitted on 5/18/2024 from her Assisted Living facility for substernal chest pain without radiation.       HOME SITUATION  Type of Home: Assisted living facility ([facility])   Home Layout: One level     Lives With: Staff 24 hours (states she works with PT/OT; has assist for bed mobility and dressing; feeds self using her R hand)  Drives: No  Patient Owned Equipment: Rolling walker;Wheelchair (states she is able to ambulate at least 30 ft. with the RW; uses the w/c for longer distances)  Patient Regularly Uses: Glasses     Prior Level of [place]: Prior to admission, patient's baseline is she resides in Assisted Living and has assistance with bed mobility, transfers and ambulation when using a RW; uses a w/c for long distance ambulation.  States she has been working with PT/OT.     ASSESSMENT   Patient demonstrates fair progress this session, goals  remain in progress.    Patient continues to function below baseline with bed  mobility, transfers, and gait.  Contributing factors to remaining limitations include decreased functional strength, impaired   balance, and decreased muscular endurance.  Next session anticipate patient to progress bed mobility, transfers, and gait.  Physical Therapy will continue to follow patient for duration of hospitalization.    Patient continues to benefit from continued skilled PT services: at discharge to promote functional independence and safety with additional support and return home with home health PT.    PLAN  PT Treatment Plan: Bed mobility;Endurance;Energy conservation;Patient education;Family education;Gait training;Coordination;Range of motion;Strengthening;Transfer training;Balance training  Rehab Potential : Good  Frequency (Obs): 3x/week    CURRENT GOALS   Goal #1 Patient is able to demonstrate supine - sit EOB @ level: supervision      Goal #2 Patient is able to demonstrate transfers Sit to/from Stand at assistance level: supervision      Goal #3 Patient is able to ambulate 150 feet with assist device: walker - emerita at assistance level: supervision      Goal #4     Goal #5     Goal #6     Goal Comments: Goals established on 2024 all goals ongoing    SUBJECTIVE  Pt very pleasant and cooperative during session    OBJECTIVE  Precautions: Bed/chair alarm    WEIGHT BEARING RESTRICTION  Weight Bearing Restriction: None                PAIN ASSESSMENT   Ratin  Location: patient denies       BALANCE                                                                                                                       Static Sitting: Fair +  Dynamic Sitting: Fair -           Static Standing: Poor +  Dynamic Standing: Poor +    ACTIVITY TOLERANCE                         O2 WALK         AM-PAC '6-Clicks' INPATIENT SHORT FORM - BASIC MOBILITY  How much difficulty does the patient currently have...  Patient Difficulty: Turning over in bed (including adjusting bedclothes, sheets and  blankets)?: A Little   Patient Difficulty: Sitting down on and standing up from a chair with arms (e.g., wheelchair, bedside commode, etc.): A Little   Patient Difficulty: Moving from lying on back to sitting on the side of the bed?: A Little   How much help from another person does the patient currently need...   Help from Another: Moving to and from a bed to a chair (including a wheelchair)?: A Little   Help from Another: Need to walk in hospital room?: A Little   Help from Another: Climbing 3-5 steps with a railing?: A Lot       AM-PAC Score:  Raw Score: 17   Approx Degree of Impairment: 50.57%   Standardized Score (AM-PAC Scale): 42.13   CMS Modifier (G-Code): CK    FUNCTIONAL ABILITY STATUS  Gait Assessment   Functional Mobility/Gait Assessment  Gait Assistance: Minimum assistance  Distance (ft): 15  Assistive Device: Other (Comment) (hemiwalker)  Pattern: L Decreased stance time;L Foot drag    Skilled Therapy Provided: Per RN okay to work with pt. Pt received in chair and was agreeable to PT session.     Bed Mobility:  Rolling: NT   Supine<>Sit: NT   Sit<>Supine: NT     Transfer Mobility:  Sit<>Stand: min A   Stand<>Sit: min A   Gait: pt ambulated with hemiwalker on the R and min A, cues provided for sequencing and hemiwalker placement    Therapist's Comments: Pt educated on role of therapy, goals for session, safety, fall prevention, and activity recommendations.     Patient End of Session: Up in chair;Needs met;Call light within reach;RN aware of session/findings;All patient questions and concerns addressed;Alarm set    PT Session Time: 15 minutes  Gait Training: 15 minutes                   Physical Therapy Note signed by Manuela De Souza PT at 5/19/2024  2:21 PM  Version 1 of 1    Author: Manuela De Souza PT Service: — Author Type: Physical Therapist    Filed: 5/19/2024  2:21 PM Date of Service: 5/19/2024  2:10 PM Status: Signed    : Manuela De Souza PT (Physical Therapist)             PHYSICAL  THERAPY EVALUATION - INPATIENT     Room Number: 8610/8610-A  Evaluation Date: 5/19/2024  Type of Evaluation: Initial  Physician Order: PT Eval and Treat    Presenting Problem: chest pain of uncertain etiology  Co-Morbidities : vascular dementia, stroke, DMT1, HTN, HLD  Reason for Therapy: Mobility Dysfunction and Discharge Planning    PHYSICAL THERAPY ASSESSMENT   Patient is currently functioning near baseline with transfers and gait.  Prior to admission, patient's baseline is she resides in Assisted Living and has assistance with bed mobility, transfers and ambulation when using a RW; uses a w/c for long distance ambulation.  Patient is requiring minimal assist as a result of the following impairments: decreased functional strength, decreased endurance/aerobic capacity, impaired standing balance, impaired coordination, decreased muscular endurance, and cognitive deficits (history of vascular dementia).  Physical Therapy will continue to follow for duration of hospitalization.    Patient will benefit from continued skilled PT Services at discharge to promote functional independence and safety with additional support and return home with home health PT. Anticipate return to her Assisted Living facility. States she was receiving PT/OT.     PLAN  PT Treatment Plan: Bed mobility;Endurance;Energy conservation;Patient education;Family education;Gait training;Coordination;Range of motion;Strengthening;Transfer training;Balance training  Rehab Potential : Good  Frequency (Obs): 3x/week  Number of Visits to Meet Established Goals: 3      CURRENT GOALS    Goal #1 Patient is able to demonstrate supine - sit EOB @ level: supervision     Goal #2 Patient is able to demonstrate transfers Sit to/from Stand at assistance level: supervision     Goal #3 Patient is able to ambulate 150 feet with assist device: walker - emerita at assistance level: supervision     Goal #4    Goal #5    Goal #6    Goal Comments: Goals established on  2024      PHYSICAL THERAPY MEDICAL/SOCIAL HISTORY  History related to current admission: Patient is a 74 year old female admitted on 2024 from her Assisted Living facility for substernal chest pain without radiation.      HOME SITUATION  Type of Home: Assisted living facility (\[facility]")   Home Layout: One level                Lives With: Staff 24 hours (states she works with PT/OT; has assist for bed mobility and dressing; feeds self using her R hand)  Drives: No  Patient Owned Equipment: Rolling walker;Wheelchair (states she is able to ambulate at least 30 ft. with the RW; uses the w/c for longer distances)  Patient Regularly Uses: Glasses    Prior Level of [place]: Prior to admission, patient's baseline is she resides in Assisted Living and has assistance with bed mobility, transfers and ambulation when using a RW; uses a w/c for long distance ambulation.  States she has been working with PT/OT.     SUBJECTIVE  \"I am waiting on my grilled cheese and soup.\"       OBJECTIVE  Precautions: Bed/chair alarm  Fall Risk: High fall risk    WEIGHT BEARING RESTRICTION  Weight Bearing Restriction: None                PAIN ASSESSMENT  Ratin  Location: patient denies       COGNITION  Following Commands:  follows all commands and directions without difficulty  Initiation: cues to initiate tasks    RANGE OF MOTION AND STRENGTH ASSESSMENT  Upper extremity ROM and strength - LUE contracture from previous stroke    Lower extremity ROM is within functional limits for her R LE; L LE hemiparesis from previous stroke    Lower extremity strength is within functional limits for R LE; L LE hemiparesis      BALANCE  Static Sitting: Good  Dynamic Sitting: Fair +  Static Standing: Fair  Dynamic Standing: Fair -    ADDITIONAL TESTS                                    ACTIVITY TOLERANCE  Pulse: 67  Heart Rate Source: Monitor                   O2 WALK  Oxygen Therapy  SPO2% on Room Air at Rest: 98    NEUROLOGICAL  FINDINGS  Neurological Findings: Tone              Tone: L hemiparesis; R UE contracture      AM-PAC '6-Clicks' INPATIENT SHORT FORM - BASIC MOBILITY  How much difficulty does the patient currently have...  Patient Difficulty: Turning over in bed (including adjusting bedclothes, sheets and blankets)?: A Little   Patient Difficulty: Sitting down on and standing up from a chair with arms (e.g., wheelchair, bedside commode, etc.): A Little   Patient Difficulty: Moving from lying on back to sitting on the side of the bed?: A Little   How much help from another person does the patient currently need...   Help from Another: Moving to and from a bed to a chair (including a wheelchair)?: A Little   Help from Another: Need to walk in hospital room?: A Little   Help from Another: Climbing 3-5 steps with a railing?: A Lot       AM-PAC Score:  Raw Score: 17   Approx Degree of Impairment: 50.57%   Standardized Score (AM-PAC Scale): 42.13   CMS Modifier (G-Code): CK    FUNCTIONAL ABILITY STATUS  Gait Assessment   Functional Mobility/Gait Assessment  Gait Assistance: Minimum assistance  Distance (ft): 45  Assistive Device: Rolling walker  Pattern: L Decreased stance time (L hemiparesis)    Skilled Therapy Provided     Bed Mobility:  Rolling: NT  Supine to sit: NT   Sit to supine: NT     Transfer Mobility:  Sit to stand: CGA/Sampson to RW   Stand to sit: CGA/Sampson, RW  Gait = x45 ft., RW, Sampson for navigating the RW    Therapist's Comments: Patient presents to PT sitting up in the bedside chair, waiting for lunch to be delivered and agreeable to working with therapy. Patient appears to be functioning near her baseline as she required x1 person assist for sit <> stand transfers and short distance ambulation using a RW within the halls. Would benefit from R hemiwalker trial next session as it was unavailable this session. Patient returned to sitting in the bedside chair upon return to her room, chair alarm donned, BLE's propped and all  needs placed within reach. RN updated.     Exercise/Education Provided:  Functional activity tolerated  Gait training  Transfer training    Patient End of Session: Up in chair;Needs met;Call light within reach;RN aware of session/findings;All patient questions and concerns addressed;Alarm set      Patient Evaluation Complexity Level:  History Moderate - 1 or 2 personal factors and/or co-morbidities   Examination of body systems Moderate - addressing a total of 3 or more elements   Clinical Presentation Low - Stable   Clinical Decision Making Low - Stable       PT Session Time: 28 minutes  Gait Trainin minutes  Therapeutic Activity: 0 minutes  Neuromuscular Re-education: 0 minutes  Therapeutic Exercise: 0 minutes                     Occupational Therapy Notes (last 72 hours)  Notes from 2024  3:00 PM through 2024  3:00 PM   No notes of this type exist for this encounter.     Video Swallow Study Notes    No notes of this type exist for this encounter.     SLP Notes    No notes of this type exist for this encounter.     Immunizations     Name Date      Covid-19 Moderna 21     Covid-19 Moderna 21       Multidisciplinary Problems     Active Goals        Problem: Patient/Family Goals    Goal Priority Disciplines Outcome Interventions   Patient/Family Long Term Goal     Interdisciplinary Progressing    Description: Patient's Long Term Goal: discharge to facility soon    Interventions:  - cardiology consult and follow up orders, labs, v/s, ,follow up appointments, , 2 d echo ,   -  cardiac monitor , safety  , pain mgt , discuss plan of care    Patient/Family Short Term Goal     Interdisciplinary Progressing    Description: Patient's Short Term Goal: safety  , pain mgt , blood sugar control    Interventions:   - monitor v/s, tele, labs, and  safety check /assist , update plan of care ,monitor glucose and adjust  insulin  , pain mgt , safety , update plan of care , medication regimen,   - See  additional Care Plan goals for specific interventions

## (undated) NOTE — LETTER
9/5/2024        Maite Cavazos        [address]        [facility] [address]         To Whom It May Concern,    Please implement the following changes to Maite's medication regimen.     Increase Tresiba to 10 units subcutaneous QAM; 4 units subcutaneous QPM. Humalog dose will remain the same.      Please feel free to contact the office with any questions.     Sincerely,    Melly Raza MD  [address], [facility] [place] [address]  [facility] [address]  Ph: [phone]  Fax: [phone]

## (undated) NOTE — LETTER
12/21/2023              [address] [id number]     To Whom It May Concern:       Please see the updated Novolog sliding scale:     Novolog sliding scale:  Breakfast:   Glucose 120-150 5 units  151-199 7 units  200-249 9 units  250-400 11 units    Lunch and dinner:   120-150 2 units  151-199 3 units  200-249 5 units   250-299 6 units  300-349 7 units   350-400 8 units       Sincerely,        Roseline Keen MD  [facility], [facility]  [address] [facility], [place] [address]  [phone]

## (undated) NOTE — LETTER
2025    To [facility] [address] [address]     Regarding:  Maite Cavazos  : 1950    Dear Facility Director or Nursing Staff,    I am writing to inform you of a necessary adjustment to Ms. Cavazos’s insulin regimen in order to better manage her blood glucose levels. The following changes are recommended:    Decrease Insulin Degludec/Tresiba dose to 6 units every morning and 5 units every evening at bedtime.      Novolog Dosing Instructions:  6 units with breakfast  6 units with lunch  8 units with dinner    Administer  Novolog three times daily with meals (TID) with sliding scale.   Follow the sliding scale for blood glucose correction as follows:  150-200: 1 units  201-250: 2 units  251-300: 3 units  301-350: 4 units  350+: 5 units and Notify our office immediately.    Important Note: Hold Novolog if pre-meal blood glucose is less than 80.     Additionally, please continue checking blood glucose levels before each meal and at bedtime.  Contact our office immediately if:  Blood glucose levels are less than 80,  Blood glucose levels remain persistently greater than 250.  We appreciate your careful monitoring of Ms. Cavazos’s glucose levels and your attention to these changes. Should you have any questions or concerns, or if further clarification is needed, please do not hesitate to contact my office directly.  Thank you for your continued care of Ms. Cavazos.      Sincerely,    Melly Raza MD

## (undated) NOTE — LETTER
Date: 2023    Pt: Howie Perez  : 1950    To Whom It May Concern:    I am writing to request a new Beta Bionic ILet Pump for my patient Howie Perez. She has uncontrolled and brittle type 1 diabetes. This request is medically necessary for the following reasons:     Patient has a history of wide glycemic excursions resulting in hyper and hypo glycemia  Patient has a history of poorly control diabetes  Due to living situations, previous pump (which is in-warranty) cannot be utilized   Patient has a history of hospitalizations in which this pump will decrease hospital admissions due to severe glycemic excursions    The Beta Bionics ILet Pump will help to lessen her glycemic excursions and overall help to improve her poorly controlled diabetes and decrease hospital admissions. Though Maite's current insulin pump is in-warranty, I am requesting a new Beta Bionic ILet Insulin Pump. As is known, the Beta Bionic ILet pump is a closed loop system. It helps manage the burden of diabetes by determining a majority of settings/ratios based off of blood sugar patterns and weight. This high end technology eliminates carb counting making it much easier for patients (or caregivers/staff) to bolus and cover for meals. Additionally, this algorithm monitors for high and low blood sugar thus preventing frequent hyper and hypoglycemia events. Due to the algorithm, it automatically adjusts insulin in response to predicted glucose levels to help increase time in the American Diabetes Association recommended target range of  mg/dl. This is extremely important as Boaz Rogers has hypoglycemia unawareness. Maite's most recent A1c was 8.0% on 8/15/2023 with wide glycemic variability.    With a new Beta Bionic ILet Insulin Pump providing automated insulin adjustments to assist with tighter blood glucose management, I expect her to obtain a much-improved A1c of <7% which is at goal based off age, medical history, and living situation. As noted above, there are many benefits to this specific pump such as meal blousing, adjustments to basal, and mini corrections every 5 minutes resulting in more time in target blood sugar ranges. With new technology and considering that type 1 diabetes is a chronic lifelong disease, it is only of benefit to allow my patient to transition to this pump to prevent any future complications from diabetes.     Thank you for your time to review this request.  Sincerely,   Dr [facility]   [phone]

## (undated) NOTE — LETTER
3/25/2025      3/24/2025        [place] [address]     Regarding:  Maite Cavazos  : 1950    Dear Facility Director or Nursing Staff,    I am writing to inform you of a necessary adjustment to Ms. Cavazos’s insulin regimen in order to better manage her blood glucose levels. After careful evaluation, the following changes are recommended:    Continue Insulin Degludec dose 16 units every morning and 5 units every evening at bedtime. This was ordered yesterday     Novolog Dosing Instructions:    6 units with breakfast  6 units with lunch  8 units with dinner    Administer  Novolog three times daily with meals (TID) with sliding scale.   Follow the sliding scale for blood glucose correction as follows:  150-200: 1 units  201-250: 2 units  251-300: 3 units  301-350: 4 units  350+: 5 units and Notify our office immediately.    Important Note: Hold Novolog if pre-meal blood glucose is less than 80.    Please call our office this afternoon with Maite's 4:00 pm blood sugar and please assess if patient is eating a different snack later in the evening.     Additionally, please continue checking blood glucose levels before each meal and at bedtime.    Contact our office immediately if:  Blood glucose levels are less than 80,  Blood glucose levels remain persistently greater than 250.  We appreciate your careful monitoring of Ms. Cavazos’s glucose levels and your attention to these changes. Should you have any questions or concerns, or if further clarification is needed, please do not hesitate to contact my office directly.    Thank you for your continued care of Ms. Cavazos.     Sincerely,     MD [address], [facility] [address]  [place] [address]  Ph: [phone]  Fax: [phone]

## (undated) NOTE — LETTER
2025    To Aixa [address] [address]    Regarding:  Maite Cavazos  : 1950    Dear Facility Director or Nursing Staff,    I am writing to inform you of a necessary adjustment to Ms. Cavazos’s insulin regimen in order to better manage her blood glucose levels. After careful evaluation, the following changes are recommended:    Decrease Insulin Degludec:  Continue insulin Degludec dose to 18 units every morning.    Continue Insulin Lispro Dosing Instructions:    Administer 6 units of insulin lispro three times daily with meals (TID) with sliding scale.   Follow the sliding scale for blood glucose correction as follows:  150-200: 2 units  201-250: 3 units  251-300: 4 units  301-350: 5 units  350+: Notify our office immediately.    Important Note that has been changed: Hold insulin lispro if pre-meal blood glucose is less than 80.    Additionally, please continue checking blood glucose levels before each meal and at bedtime.    Contact our office immediately if:    Blood glucose levels are less than 80,  Blood glucose levels remain persistently greater than 250.  We appreciate your careful monitoring of Ms. Cavazos’s glucose levels and your attention to these changes. Should you have any questions or concerns, or if further clarification is needed, please do not hesitate to contact my office directly.    Thank you for your continued care of Ms. Cavazos.    Sincerely,    MD [address], [facility] [address]  [place] [address]  Ph: [phone]  Fax: [phone]

## (undated) NOTE — LETTER
2025    To [facility] [address] [address]    Regarding:  Maite Cavazos  : 1950    Dear Facility Director or Nursing Staff,    I am writing to inform you of a necessary adjustment to Ms. Cavazos’s insulin regimen in order to better manage her blood glucose levels. After careful evaluation, the following changes are recommended:    Decrease Insulin Degludec:  Please reduce the insulin Degludec dose to 18 units every morning.    Discontinue Insulin Aspart:  Discontinue the use of insulin aspart entirely.    Change Insulin Lispro Dosing Instructions:    Administer 6 units of insulin lispro three times daily with meals (TID) with sliding scale.   Follow the sliding scale for blood glucose correction as follows:  150-200: 2 units  201-250: 3 units  251-300: 4 units  301-350: 5 units  350+: Notify our office immediately.  Important Note: Hold insulin lispro if pre-meal blood glucose is less than 120.  Additionally, please continue checking blood glucose levels before each meal and at bedtime.    Contact our office immediately if:    Blood glucose levels are less than 80,  Blood glucose levels remain persistently greater than 250.  We appreciate your careful monitoring of Ms. Cavazos’s glucose levels and your attention to these changes. Should you have any questions or concerns, or if further clarification is needed, please do not hesitate to contact my office directly.    Thank you for your continued care of Ms. Cavazos.    Sincerely,    MD Charlene Mayes [address], [facility] [address]  [place] [address]  Ph: [phone]  Fax: [phone]